# Patient Record
Sex: MALE | Race: WHITE | NOT HISPANIC OR LATINO | Employment: STUDENT | URBAN - METROPOLITAN AREA
[De-identification: names, ages, dates, MRNs, and addresses within clinical notes are randomized per-mention and may not be internally consistent; named-entity substitution may affect disease eponyms.]

---
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
## 2019-02-28 ENCOUNTER — OFFICE VISIT (OUTPATIENT)
Dept: INTERNAL MEDICINE | Facility: CLINIC | Age: 19
End: 2019-02-28
Payer: COMMERCIAL

## 2019-02-28 VITALS
DIASTOLIC BLOOD PRESSURE: 82 MMHG | OXYGEN SATURATION: 97 % | WEIGHT: 244.2 LBS | HEART RATE: 117 BPM | RESPIRATION RATE: 20 BRPM | SYSTOLIC BLOOD PRESSURE: 139 MMHG

## 2019-02-28 DIAGNOSIS — Z00.00 HEALTH CARE MAINTENANCE: ICD-10-CM

## 2019-02-28 DIAGNOSIS — Z76.89 ENCOUNTER TO ESTABLISH CARE WITH NEW DOCTOR: Primary | ICD-10-CM

## 2019-02-28 DIAGNOSIS — R00.0 TACHYCARDIA: ICD-10-CM

## 2019-02-28 DIAGNOSIS — F41.1 GENERALIZED ANXIETY DISORDER: ICD-10-CM

## 2019-02-28 DIAGNOSIS — F40.10 SOCIAL ANXIETY DISORDER: ICD-10-CM

## 2019-02-28 DIAGNOSIS — Z23 NEED FOR PROPHYLACTIC VACCINATION WITH TETANUS-DIPHTHERIA (TD): ICD-10-CM

## 2019-02-28 DIAGNOSIS — G43.109 MIGRAINE WITH AURA, NOT INTRACTABLE, WITHOUT STATUS MIGRAINOSUS: ICD-10-CM

## 2019-02-28 DIAGNOSIS — F33.2 MAJOR DEPRESSIVE DISORDER, RECURRENT EPISODE, SEVERE WITH ANXIOUS DISTRESS (H): ICD-10-CM

## 2019-02-28 DIAGNOSIS — R41.3 MEMORY PROBLEM: ICD-10-CM

## 2019-02-28 RX ORDER — TRAZODONE HYDROCHLORIDE 50 MG/1
100 TABLET, FILM COATED ORAL AT BEDTIME
Qty: 180 TABLET | Refills: 3 | COMMUNITY
Start: 2019-02-28 | End: 2019-12-11

## 2019-02-28 RX ORDER — PERPHENAZINE AND AMITRIPTYLINE HYDROCHLORIDE 2; 10 MG/1; MG/1
1 TABLET, FILM COATED ORAL AT BEDTIME
Qty: 90 TABLET | Refills: 3 | COMMUNITY
Start: 2019-02-28 | End: 2019-12-11

## 2019-02-28 RX ORDER — NORTRIPTYLINE HCL 25 MG
25 CAPSULE ORAL AT BEDTIME
Qty: 90 CAPSULE | Refills: 3 | COMMUNITY
Start: 2019-02-28 | End: 2019-12-11

## 2019-02-28 SDOH — HEALTH STABILITY: MENTAL HEALTH: HOW MANY STANDARD DRINKS CONTAINING ALCOHOL DO YOU HAVE ON A TYPICAL DAY?: 1 OR 2

## 2019-02-28 SDOH — HEALTH STABILITY: MENTAL HEALTH: HOW OFTEN DO YOU HAVE A DRINK CONTAINING ALCOHOL?: MONTHLY OR LESS

## 2019-02-28 SDOH — HEALTH STABILITY: MENTAL HEALTH: HOW OFTEN DO YOU HAVE 6 OR MORE DRINKS ON ONE OCCASION?: MONTHLY

## 2019-02-28 SDOH — HEALTH STABILITY: MENTAL HEALTH: HOW MANY DRINKS CONTAINING ALCOHOL DO YOU HAVE ON A TYPICAL DAY WHEN YOU ARE DRINKING?: 1 OR 2

## 2019-02-28 SDOH — HEALTH STABILITY: MENTAL HEALTH: HOW OFTEN DO YOU HAVE SIX OR MORE DRINKS ON ONE OCCASION?: MONTHLY

## 2019-02-28 ASSESSMENT — PAIN SCALES - GENERAL: PAINLEVEL: NO PAIN (0)

## 2019-02-28 NOTE — NURSING NOTE
TDAP shot given without problems,patient tolerated procedure well.Alessandra Mckenzie LPN 10:09 AM on 2/28/2019

## 2019-02-28 NOTE — PATIENT INSTRUCTIONS
Patient Education   Patient Education     Losing Weight for Heart Health  Excess weight is a major risk factor for heart disease. Losing weight has many benefits including lowering your blood pressure, improving your cholesterol level, and decreasing your risk for diseases such as diabetes and heart disease. It may help keep your arteries open so that your heart can get the oxygen-rich blood it needs. All in all, losing weight makes you healthier.  Patient Education     Weight Management: Healthy Eating  Food is your body s fuel. You can t live without it. The key is to give your body enough nutrients and energy without eating too much. Reading food labels can help you make healthy choices. Also, learn new eating habits to manage your weight. Nutrition labels are being redesigned by the FDA to emphasize the number of calories being consumed as well as the amount of more nutrients, such as added sugars, vitamin D, and potassium.     All the values on the label are based on one serving. The serving size is the average portion. Remember to multiply the values on the label by the number of servings you eat.   Eat less fat  A gram of fat has almost 2.5 times the calories of a gram of protein or carbohydrates. Try to balance your food choices so that only 20% to 35% of your calories comes from total fat. This means an average of 2  to 3  grams of fat for each 100 calories you eat.  Eat more fiber  High-fiber foods are digested more slowly than low-fiber foods, so you feel full longer. Try to get at least 25 grams of fiber each day for a 2000 calorie diet. Foods high in fiber include:    Vegetables and fruits    Whole-grain or bran breads, pastas, and cereals    Legumes (beans) and peas  As you start to eat more fiber, be sure to drink plenty of water to keep your digestive system working smoothly.  Tips  Do's and don'ts include:     Don t skip meals. This often leads to overeating later on. It s best to spread your  eating throughout the day.    Eat a variety of foods, not just a few favorites.    If you find yourself eating when you re not hungry, ask yourself why. Many of us eat when we re bored, stressed, or just to be polite. Listen to your body. If you re not hungry, get busy doing something else instead of eating.    Eat slower, shooting for 20 to 30 minutes for each meal. It takes 20 minutes for your stomach to tell your brain that it s full. Slow eaters tend to eat less and are still satisfied, while fast eaters may tend to be overeaters.     Pay attention to what you eat. Don t read or watch TV during your meal.  Date Last Reviewed: 4/1/2018 2000-2018 [employer]. [address] [place] [address], [place] [address]. All rights reserved. This information is not intended as a substitute for professional medical care. Always follow your healthcare professional's instructions.            Exercise with a friend. When activity is fun, you're more likely to stick with it.   Calories and weight loss    Calories are the fuel your body burns for energy. You get the calories you need from the food you eat. For healthy weight loss, women should eat at least 1,200 calories a day, men at least 1,500.    When you eat more calories than you need, your body stores the extra calories as fat. One pound of fat equals 3,500 calories.    To lose weight, try to reduce your total calorie intake by 500 calories. To do this, eat 250 calories less each day. Add activity to burn the other 250 calories. Walking 2.5 miles burns about 250 calories. Other more intense activities can burn more calories in the time you spend doing them, such as swimming and running. It is important to understand that reducing calorie intake is much more effective at weight loss than is exercise.    Eat a variety of healthy foods to get the nutrients you need.  Tips for losing weight    Drink 8 to 10 glasses of water a day.    Don t skip meals. Instead, eat  smaller portions.    Eat your meals earlier in the day.    Cut out sugary drinks such as soda and fruit juices.    Make your later meals lighter than your earlier meals.   Brisk activity is best  Brisk activity gets your heart pumping faster and it makes it healthier. It s also a great way to burn calories. In fact, your body may keep burning calories for hours after you stop a brisk activity:    Begin by walking 10 minutes most days.    Add more time and speed to your walk. Build up as you feel able.    Aim for 3 to 4 sessions of aerobic exercise a week. Each session should last about 40 minutes and include moderate to vigorous physical activity.    The most important part of the activity is that you break a sweat. This indicates your heart is working hard enough to burn fat.  Date Last Reviewed: 6/1/2016 2000-2018 [employer]. [address] [place] [address]. All rights reserved. This information is not intended as a substitute for professional medical care. Always follow your healthcare professional's instructions.           Taking Your Blood Pressure  Blood pressure is the force of blood against the artery wall as it moves from the heart through the blood vessels. You can take your own blood pressure reading using a digital monitor. Take your readings the same each time, using the same arm. Take readings as often as your healthcare provider instructs.  About blood pressure monitors  Blood pressure monitors are designed for certain ages and cases. You can find monitors for older adults, for pregnant women, and for children. Make sure the one you choose is the right one for your age and situation.  The American Heart Association recommends an automatic cuff monitor that fits on your upper arm (bicep). The cuff should fit your arm size. A cuff that s too large or too small will not give an accurate reading. Measure around your upper arm to find your size.  Monitors that attach to your  finger or wrist are not as accurate as monitors for your upper arm.  Ask your healthcare provider for help in choosing a monitor. Bring your monitor to your next provider visit if you need help in using it the correct way.  The steps below are general instructions for using an automatic digital monitor.  Step 1. Relax      Take your blood pressure at the same time every day, such as in the morning or evening, or at the time your healthcare provider recommends.    Wait at least a half-hour after smoking, eating, or exercising. Don't drink coffee, tea, soda, or other caffeinated beverages before checking your blood pressure.    Sit comfortably at a table with both feet on the floor. Do not cross your legs or feet. Place the monitor near you.    Rest for a few minutes before you begin.  Step 2. Wrap the cuff      Place your arm on the table, palm up. Your arm should be at the level of your heart. Wrap the cuff around your upper arm, just above your elbow. It s best done on bare skin, not over clothing. Most cuffs will indicate where the brachial artery (the blood vessel in the middle of the arm at the inner side of the elbow) should line up with the cuff. Look in your monitor's instruction booklet for an illustration. You can also bring your cuff to your healthcare provider and have them show you how to correctly place the cuff.  Step 3. Inflate the cuff      Push the button that starts the pump.    The cuff will tighten, then loosen.    The numbers will change. When they stop changing, your blood pressure reading will appear.    Take 2 or 3 readings one minute apart.  Step 4. Write down the results of each reading      Write down your blood pressure numbers for each reading. Note the date and time. Keep your results in one place, such as a notebook. Even if your monitor has a built-in memory, keep a hard copy of the readings.    Remove the cuff from your arm. Turn off the machine.    Bring your blood pressure records  with your healthcare providers at each visit.    If you start a new blood pressure medicine, note the day you started the new medicine. Also note the day if you change the dose of your medicine. This information goes on your blood pressure recording sheet. This will help your healthcare provider monitor how well the medicine changes are working.    Ask your healthcare provider what numbers should prompt you to call him or her. Also ask what numbers should prompt you to get help right away.  Date Last Reviewed: 11/1/2016 2000-2018 [employer]. [address] [place] [address], [place] [address]. All rights reserved. This information is not intended as a substitute for professional medical care. Always follow your healthcare professional's instructions.

## 2019-02-28 NOTE — PROGRESS NOTES
Pre charting time 10 minutes:    Looked for outside records:  From none in Care Everywhere, none in my inbox.  There is one note in our EHR, I reviewed ED visit 11/6/18   Presented to [place] ED because he was suicidal. Thoughts but no plan.  Escalating depression.  2 prior suicide attempts.   Followed at [facility] mental health clinic, had been seeing them every 2 weeks, not on meds at the time of the ED visit.  Evaluated by [facility] in the ED.  No alcohol or drug use.  Patient was discharged from the ED with an appointment in mental health the following day.   Lamotrigine and venlafaxine are on his current med list.    Dr. Billingsley    CC:  Establish care    HPI:  Here for the above.  We reviewed his medical history in detail and updated the EHR database.  His main chronic health condition is psychological in nature.  Interesting description of his behaviors growing up.  Currently depression, anxiety are most prominent symptoms.  Today scratched his left arm in reaction to stress.  He does have a history of self-injury behaviors.  Usually with items not sharp enough to cause deep cuts.  Interestingly he reports amnesia when he has episodes of self-harm.  Has therapist whom he sees weekly.  His psychiatrist is in his home state of [place].  I did advise him to get lined up with a psychiatrist here in [place] as well.  Alexandru is a freshman here at the .  His parents are physicians and he is quite knowledgeable and insightful about his mental health.    ROS:  Otherwise 10 point ROS is negative.    Past Medical History:   Diagnosis Date     Depressive disorder      Developmental CNS abnormality (H)     as child, emotional lability, over focusing on minor details, distractibility, difficult to reassure, overly fascinated with subjects, social skills dificulty. . .     Generalized anxiety disorder      Major depressive disorder, recurrent episode, severe with anxious distress (H)       Migraine with aura, not intractable, without status migrainosus      Obstructive sleep apnea      Premature birth     pre patient report, at 8 months gestation     Social anxiety disorder      Tremor      Past Surgical History:   Procedure Laterality Date     HEAD & NECK SURGERY       TONSILLECTOMY       Family History   Problem Relation Age of Onset     Depression Mother      Anxiety Disorder Mother      Nephrolithiasis Father      Diabetes Maternal Grandfather      Heart Failure Maternal Grandfather      Bipolar Disorder Maternal Grandfather      Hypertension Paternal Grandmother      Sleep Apnea Paternal Grandmother      Hypertension Paternal Grandfather      Social History     Socioeconomic History     Marital status: Single     Spouse name: Not on file     Number of children: Not on file     Years of education: Not on file     Highest education level: Not on file   Occupational History     Not on file   Social Needs     Financial resource strain: Not on file     Food insecurity:     Worry: Not on file     Inability: Not on file     Transportation needs:     Medical: Not on file     Non-medical: Not on file   Tobacco Use     Smoking status: Former Smoker     Smokeless tobacco: Former User   Substance and Sexual Activity     Alcohol use: Yes     Alcohol/week: 0.6 oz     Types: 1 Cans of beer per week     Frequency: Monthly or less     Drinks per session: 1 or 2     Binge frequency: Monthly     Drug use: No     Sexual activity: Not on file   Lifestyle     Physical activity:     Days per week: Not on file     Minutes per session: Not on file     Stress: Not on file   Relationships     Social connections:     Talks on phone: Not on file     Gets together: Not on file     Attends Taoism service: Not on file     Active member of club or organization: Not on file     Attends meetings of clubs or organizations: Not on file     Relationship status: Not on file     Intimate partner violence:     Fear of current or ex  partner: Not on file     Emotionally abused: Not on file     Physically abused: Not on file     Forced sexual activity: Not on file   Other Topics Concern     Not on file   Social History Narrative    Freshman here at the  of [place]. Parents are doctors. Has mental health therapist, [address], [place] [phone] and psychiatrist., Dr. Fuentes in [place]. Taoism Christianity willi, periodically vegan diet.     Current Outpatient Medications   Medication Sig Dispense Refill     LamoTRIgine (LAMICTAL PO) Take 200 mg by mouth daily        Metoprolol Succinate 50 MG CS24 Take 50 mg by mouth daily 90 capsule 3     nortriptyline (PAMELOR) 25 MG capsule Take 1 capsule (25 mg) by mouth At Bedtime 90 capsule 3     perphenazine-amitriptyline 2-10 MG TABS per tablet Take 1 tablet by mouth At Bedtime 90 tablet 3     traZODone (DESYREL) 50 MG tablet Take 2 tablets (100 mg) by mouth At Bedtime 180 tablet 3     Venlafaxine HCl (EFFEXOR PO) Take 150 mg by mouth daily        No Known Allergies  /82 (BP Location: Right arm, Patient Position: Sitting, Cuff Size: Adult Large)   Pulse 117   Resp 20   Wt 110.8 kg (244 lb 3.2 oz)   SpO2 97%   Exam otherwise deferred.  No signs of depression, anxiety today.  Normal affect.    Alexandru was seen today for establish care and sexual problem.    Diagnoses and all orders for this visit:    Encounter to establish care with new doctor    Need for prophylactic vaccination with tetanus-diphtheria (Td)  -     TDAP ( BOOSTRIX AGES 10-64)    Migraine with aura, not intractable, without status migrainosus    Tachycardia, chronic    Memory problem, social anxiety disorder, RACHEL, MDD  -     NEUROPSYCHOLOGY REFERRAL  -     MENTAL HEALTH REFERRAL  - Adult; Psychiatry and Medication Management; Psychiatry; [facility]: Psychiatry Clinic [phone]; We will contact you to schedule the appointment or please call with any questions    Health Care Maintenance:  I advised home BP and HR  checks, 3 times a week.  Discussed goals and gave information on how to check BP correctly  I also gave him info on diet and weight management.      Total time spent 40 minutes (not including pre charting).  More than 50% of the time spent with Mr. Mitchell on counseling / coordinating his care        F/U 6 months.    Jerrica Billingsley M.D.  Internal Medicine  [facility]   pager [phone]

## 2019-02-28 NOTE — NURSING NOTE
Chief Complaint   Patient presents with     [facility]     establish care     Sexual Problem     low sex drive   Alessandra Mckenzie LPN 8:52 AM on 2/28/2019    Has not been screened for HIV.Alessandra Mckenzie LPN 8:53 AM on 2/28/2019  Had HPV when was in [employer].Alessandra Mckenzie LPN 8:55 AM on 2/28/2019    Addendum: Patient has been feeling very sad and has one emergency room visit for wanting to kill himself.Alessandra Mckenzie LPN 8:58 AM on 2/28/2019    PHQ-2 not correct.Alessandra Mckenzie LPN 8:58 AM on 2/28/2019

## 2019-10-14 ENCOUNTER — TELEPHONE (OUTPATIENT)
Dept: INTERNAL MEDICINE | Facility: CLINIC | Age: 19
End: 2019-10-14

## 2019-10-14 DIAGNOSIS — R00.0 TACHYCARDIA: ICD-10-CM

## 2019-10-14 NOTE — TELEPHONE ENCOUNTER
Health Call Center    Phone Message    May a detailed message be left on voicemail: no    Reason for Call: Medication Refill Request    Has the patient contacted the pharmacy for the refill? Yes   Name of medication being requested: Metoprolol Succinate 50 MG CS24  Provider who prescribed the medication: Dr. Billingsley  Pharmacy: [facility]  Date medication is needed: Asap, Pt has 6 pills, Pt says the original script from Dr. Billingsley w/ 3 refills was never received by [place] pharmacy to begin with. Please call Pt to discuss.    Action Taken: Message routed to:  [facility] ([facility]): [facility]

## 2019-11-01 ENCOUNTER — OFFICE VISIT (OUTPATIENT)
Dept: NEUROPSYCHOLOGY | Facility: CLINIC | Age: 19
End: 2019-11-01
Payer: COMMERCIAL

## 2019-11-01 DIAGNOSIS — F41.1 GENERALIZED ANXIETY DISORDER: ICD-10-CM

## 2019-11-01 DIAGNOSIS — F31.5 BIPOLAR DISORDER, CURRENT EPISODE DEPRESSED, SEVERE, WITH PSYCHOTIC FEATURES (H): Primary | ICD-10-CM

## 2019-11-01 DIAGNOSIS — F41.0 PANIC DISORDER WITHOUT AGORAPHOBIA: ICD-10-CM

## 2019-11-01 NOTE — PROGRESS NOTES
The patient was seen for neuropsychological evaluation at the request of Jerrica Billingsley MD for the purposes of diagnostic clarification and treatment planning.  160 minutes of test administration and scoring were provided today by this writer. Testing was not completed due to schedule conflicts, and the patient will return at a later date to complete testing.    Joanna Bond  Psychometrist

## 2019-11-04 ENCOUNTER — OFFICE VISIT (OUTPATIENT)
Dept: NEUROPSYCHOLOGY | Facility: CLINIC | Age: 19
End: 2019-11-04
Payer: COMMERCIAL

## 2019-11-04 DIAGNOSIS — F31.5 BIPOLAR I DISORDER, MOST RECENT EPISODE (OR CURRENT) DEPRESSED, SEVERE, SPECIFIED AS WITH PSYCHOTIC BEHAVIOR (H): Primary | ICD-10-CM

## 2019-11-04 DIAGNOSIS — F41.0 PANIC DISORDER WITHOUT AGORAPHOBIA: ICD-10-CM

## 2019-11-04 DIAGNOSIS — F41.1 GENERALIZED ANXIETY DISORDER: ICD-10-CM

## 2019-11-04 NOTE — PROGRESS NOTES
The patient was seen to complete neuropsychological evaluation at the request of Jerrica Billingsley MD for the purposes of diagnostic clarification and treatment planning.  65 minutes of test administration and scoring were provided by this writer on this date, for a total of 225 minutes of test administration and scoring across both sessions.  Please see Dr. Douglas Sheldon's report for a full interpretation of the findings.    Joanna Bond  Psychometrist

## 2019-11-27 NOTE — PROGRESS NOTES
NAME: Alexandru Mitchell  MRN: [id number]  : 2000  DUCKWORTH: 2019 & 2019  Neuropsychology Laboratory  [address]  [phone]    NEUROPSYCHOLOGICAL EVALUATION    RELEVANT HISTORY AND REASON FOR REFERRAL    This is a report of neuropsychological consultation regarding Alexandru Mitchell, a 19-year-old, right-handed University student with 13 years of education thus far. He is referred for neuropsychological assessment of brain functioning by his primary care provider, Dr. Jerrica Billingsley. Her referral request lists concerns about memory, anxiety, and depression.     Mr. Do says he is not concerned about his memory at this time. He says issues really only surface in the presence of flagrant mental health symptoms. He says just before he saw Dr. Billingsley, he had an episode ( a fit ) in which he was engaging in self-harm, cutting his arm. He had trouble remembering details of the incident in discussions with Dr. Billingsley. Otherwise, he is not particularly concerned about his memory. He does note that he feels easily distracted, and his own racing thoughts derail his capacity to sustain attention. When he is stressed out, cognitive slowing becomes prominent. He also experiences thought blocking.    Mr. Mitchell is originally from [place] and has come here for college. He is currently majoring in [employer]. He says that on paper, his academic performances still look great, but his mental health struggles seriously interfere.    It seems he has previously been diagnosed with generalized anxiety, social anxiety, and major depressive disorder. He has a history of suicide attempts (three events) and self-harm behaviors starting at age 14. He has a history of hallucinatory experiences. He says suicide and self-harm options are always on the back of his mind, but he denies any current intent. He does describe specific methods that he would have in mind, such as jumping off  the [place] bridge or getting in the way of the light rail train.     He reports rumination on the death of his grandfather in 2012. He describes social isolation. He reports racing thoughts and about weekly periods of going entirely without sleep. He sometimes hears music in his head, the voice of his grandfather, and the voice of an uncle (who committed suicide). He denies any visual hallucinatory experiences. He says the auditory hallucinations only come on in times of severe mental distress. Any thoughts of his grandfather or uncle immediately provoke depressive symptoms. He is chronically tired yet also beset by anxiety-driven insomnia when he tries to sleep. He also reports times when he will feel  weirdly happy  or energetic and go entirely without sleep, perhaps 1-2 times per month. He describes sleep as all or nothing. If he goes without sleep he crashes the next day and sleeps for 10-12 hours straight. He describes himself as chronically highly anxious. He has had increased frequency of panic attacks since starting college. For example, he says he had 5-6 panic attacks this week alone. His only medication aimed at stemming panic is metoprolol. He feels it is ineffective. He has had some initial calls trying to establish care with a psychiatrist up here but has not yet done so. His current medications include venlafaxine, lamotrigine, perphenazine-amitriptyline, trazodone, metoprolol, and nortriptyline. He sees OSBALDO Roberson, at [facility]. He has weekly sessions. He signed a release of information form allowing me to send a copy of this report to her. He says he feels comfortable talking to Ms. Solo if thoughts of self-harm worsen.    He describes a baseline personality of being a rigid rule follower, sometimes to a fault. This flipped around 7th grade, after his uncle committed suicide. While he no longer feels the need to rigidly adhere to externally applied roles, he says  he still feels a need to rigidly adhere to his own  internal code.     Growing up, he says he never felt the need to have friends. He does some socializing here at college, but he says he is scared of making connections with others as a result of bad experiences he had with peers back in [place]. He says he has never had an intrinsic sense of social skills. There were concerns about pervasive developmental disorders when he was in . His parents, who are both physicians, sought out a workup at the [facility]. Mr. Mitchell says that autism spectrum disorders were ruled out, but his parents were told there was something atypical in his behaviors nonetheless.    He was in [employer] and achieved the honor of [employer] . He describes some [employer]-based volunteer work, in addition to his schoolwork.    Academically, language arts were always a real strength for him. He also liked history and biology. Anything with math was difficult. He felt a general lack of conceptual comprehension in mathematics. With a great deal of work, he was able to get C- grades in his high school math classes.     He reports occasional alcohol use currently, depending on access. Over the summer, he had a stretch of about 2-1/2 months in which he was drinking daily. He estimates averaging 6 drinks per day at that time, and there was a stretch of 1-2 weeks in which his use was well over that. His past use was generally in a binging pattern. Two prior suicide attempts were associated with the use of 190-proof moonshine plus an overdose on a variety of prescription medications.    He describes himself as dependent upon tobacco. He has quit smoking cigarettes and has been trying to use an e-cigarette and lozenges to wean himself off of nicotine. He occasionally uses chewing tobacco.    He says he once tried marijuana but has not experimented with any other drugs or been a habitual drug user.    Reported family history includes nicotine  dependence on all sides of the family. His maternal grandfather was diagnosed with bipolar II disorder. As noted, an uncle committed suicide. He describes his mother as having  weapons-grade anxiety  but refusing to address it. He has a younger sister who seems to be psychologically healthy, with the normal ups and downs compared to her peers.    BEHAVIORAL OBSERVATIONS    Mr. Mitchell was polite and cooperative with the evaluation. He was tense and fidgety throughout the our time together. He described himself as scared and pointed out the fact that his hands were shaking during the testing session. I conducted the interview alongside a graduate practicum student in our clinic. He was very open and candid during the interview, despite a clear sense of social discomfort. He demonstrated a high level of psychological insight. There were no aphasic qualities to his speech production. Language comprehension was full and immediate. He needed occasional repetitions of individual test items but not full test instructions. He was notably distractible at times. He tended to be careless in his approach to visuoperceptual tasks. He tended to doubt his abilities. He persisted well with all requested procedures and appeared to put forth good effort. The cognitive testing and brief questionnaires were completed at the first visit (11/01/2019), and he came back on another day (11/04/2019) to complete the MMPI-2-RF. Acute anxiousness and distractibility may have interfered with some of his test performances, but I believe the data provide reasonable reflections of his cognitive abilities.    MEASURES ADMINISTERED    The following measures were administered by a trained psychometrist, under my direct supervision:    Wide Range Achievement Test 4: Word Reading, Math Computation; Wechsler Adult Intelligence Scale-IV: Similarities, Information, Block Design, Matrix Reasoning, Digit Span, Arithmetic, Coding; Controlled Oral Word Association  Test; PEARCE Sentence Repetition; Bettye Visual Acuity Screen; Grooved Pegboard; Trail Making Test; Porteus Maze Test; Azael-Osterrieth Complex Figure Test; Azael Auditory Verbal Learning Test; Dot Counting Test; Vaz Depression Inventory-II; State-Trait Anxiety Inventory; Wender-[place] Rating Scales; Minnesota Multiphasic Personality Jwoyeonun-3-TN.    RESULTS AND INTERPRETATION    Academic Achievement: Performance on a reading and pronunciation test that is validated for estimating premorbid intelligence was in the very superior range (96th percentile). Consistent with his self-report of relative weakness in mathematics, his written math skills were in the middle average range (50th percentile).    Intellectual Abilities: Abstract verbal analogical reasoning was in the superior range. General fund of information was in the very superior range. Visual reasoning through pattern identification was high average. Nonverbal reasoning through hands-on object assembly was high average.    Language & Related Skills: As noted above, basic reading and pronunciation skills were very superior. Associative verbal fluency was average. Verbatim sentence repetition was on the lower side of average.    Visual Perceptual & Constructional Skills: Binocular, uncorrected, near-point visual acuity was 20/20 on Bettye screening. His copy of a complex geometric figure was in the impaired range for his age. His drawing did not suggest fundamental perceptual or constructional defects. Imprecision in his figure copy appeared to be due to a somewhat piecemeal approach leading to erasures and re-drawings of several areas.    Motor Skills: Speeded fine-motor dexterity for placing shaped pegs into holes was bilaterally average.    Mental Speed & Executive Functioning: As noted above, speeded verbal fluency performances were average; he did not have any repetition or set-loss errors. Graphomotor clerical speed was on the lower side of average.  Visual scanning and graphomotor sequencing under simple conditions was low average for speed and had no errors. Scanning and sequencing under greater executive demands to control divided attention was borderline impaired for speed and had two errors. Planning, foresight, and learning from errors were high normal on an unspeeded maze-solving test.    Attention & Working Memory: Immediate auditory attention and working memory were on the lower side of average for repeating and rearranging digit strings, and his performances were notably inconsistent across trials. Attention and working memory were average for solving mental math problems.     Learning & Anterograde Memory: A few minutes after the initial copy, incidental free recall of the complex figure was in the low average range. Free recall of the figure remained in the low average range after 30 minutes, while recognition of individual figure elements was high average. Learning an extensive word list over repeated readings was average. Free recall of the list was average after a brief delay with active interference and after a 30-minute delay. Delayed recognition of the list was perfect.    Emotional, Behavioral, & Personality Functioning: On a brief self-report inventory, he endorsed symptoms consistent with a severe major depressive episode (BDI-II = 41).    He rated his general, day-to-day anxiety levels as severely elevated (Trait Anxiety = 100th percentile). He rated his in-the-moment anxiety during the testing session as severely elevated (State Anxiety = 100th percentile).    His retrospective self-report of childhood and adolescent behaviors was below established cutoffs for strongly suggesting the presence of developmental ADHD (WURS = 37), though the score suggests clear tendencies in that direction.    His responses to a lengthy questionnaire for objective assessment of personality functioning and emotional coping patterns (MMPI-2-RF) was technically  invalid for a pattern of widespread symptom overreporting. Overreporting of psychological dysfunction was indicated by a considerably larger than average number of empirically infrequent symptom endorsements. He also asserted a much larger than average number of symptoms that are rarely described by individuals with genuine, severe psychopathology. He endorsed a considerably larger than average number of somatic symptoms that are rarely described by individuals with genuine medical problems. He produced elevations on embedded validity scales that have empirical associations to noncredible memory complaints and a high likelihood for psychogenic amplification of organic-seeming suffering. In this context, seven of the nine core clinical scales were significantly elevated. The majority of the various supplemental scales were also significantly elevated. Such a  floating profile  is generally a result of symptom exaggeration. This is not to say that Mr. Mitchell is without psychological difficulties. The appropriate interpretation is that he likely feels completely overwhelmed by his mental health symptoms, that they seem to affect all domains of his daily functioning, and that he probably sees them as entirely beyond his personal control. This MMPI-2-RF profile leaves a relatively wide diagnostic differential. Considerations would include severe monopolar and bipolar mood disorders, severe anxiety disorders, psychotic disorders, somatic symptom disorders, and personality pathology.    IMPRESSIONS    The neuropsychological results are abnormal, primarily for psychological considerations.    Cognitively, he demonstrates variability and relative weaknesses in attention, concentration, working memory, and speeded executive control, with performances ranging from borderline impaired to middle average. He has exceptional strengths in verbally mediated intellectual abilities, in the very superior range. Nonverbal/spatial abilities  are still in the high average range but about 1.5 standard deviations below the verbal abilities. Complex visuoconstructional abilities are lower than expected. Anterograde memory functions are normal. I do not suspect acquired or encroaching neurologic dysfunction. I think the neuropsychological profile is primary explained by neurodevelopmental factors (i.e., his long-standing pattern of strengths and weaknesses) and related mental health factors.    Psychometric assessments of mental health symptoms and personality functioning suggest severe emotional distress, though a high likelihood for excess of symptom endorsement. In interview, his descriptions of presenting concerns and recent behaviors primarily suggest bipolar disorder (currently mostly on the depressive side) coupled with severe anxiety and panic. He may meet criteria for a major depressive episode with psychotic features, but schizoaffective disorder and prodromal or first-break schizophrenia would still need to be ruled out.     RECOMMENDATIONS    The primary recommendation would be for more aggressive approaches to treating mental health. He has a history of multiple suicide attempts and self-harm behaviors. Close suicide monitoring is needed. In addition to his current psychotherapy sessions, it would be reasonable to consider an intensive outpatient or day treatment programs. He should have a low threshold for seeking inpatient treatment, especially if thoughts related to self-harm should worsen.    I encourage him to follow through on his plans to establish care with a local psychiatrist. He says he has not yet tried to see a provider at [facility] ([email or web address]). I think he should try getting in with them first, to work on stabilization of symptoms. He may also try establishing care with a [facility] provider through the psychiatry department at the [facility]  ([phone]), or at the [facility] location ([phone]). He may wish to speak with a psychiatrist about whether or not he is an appropriate candidate for advanced therapies like transcranial magnetic stimulation or ketamine infusions, treatments that are offered at the [facility] location.    He should avail himself of all available student counseling and mental health supports offered by the [facility].    [email or web address]    [email or web address]    He should be encouraged to abstain from alcohol use at this time. In addition to avoiding legal issues related to being underage, problematic patterns of alcohol consumption are not going to help his current emotional symptoms. It is worth reiterating that two of his three prior suicide attempts were associated with excessive alcohol consumption.     He describes struggling to cut back on tobacco use. Referral for a clinical tobacco cessation program would be appropriate.    A neuropsychological baseline has been established. I do not foresee a need for reevaluation in the near term. I or my colleagues would be happy to see him again, if ever clinically indicated.    Douglas Sheldon, PhD, LP, ABPP-CN  Board Certified in Clinical Neuropsychology  Licensed Psychologist [id number]     Department of Rehabilitation Medicine  Division of Adult Neuropsychology  [facility]      Time spent: One hour neurobehavioral status exam including interview, clinical assessment by licensed and board-certified neuropsychologist (CPT 82823). One hour neuropsychological testing evaluation by licensed and board-certified neuropsychologist, including integration of patient data, interpretation of standardized test results and clinical data, clinical decision-making, treatment planning, report, and interactive feedback to the patient, first hour (CPT 77884). Two hours of neuropsychological  testing evaluation by licensed and board-certified neuropsychologist, including integration of patient data, interpretation of standardized test results and clinical data, clinical decision-making, treatment planning, report, and interactive feedback to the patient, subsequent hours (CPT 55745). 30 minutes of psychological and neuropsychological test administration and scoring by technician, first 30 minutes (CPT 78828). 195 minutes psychological or neuropsychological test administration and scoring by technician, subsequent 30-minute intervals (CPT 45359). Diagnoses: F31.5, F41.1, F41.0

## 2019-11-27 NOTE — PROGRESS NOTES
This appointment on 11/04/2019 was for a continuation of services began on 11/01/2019. Data from both visits were integrated and documented in my full report attached to the earlier encounter.     Douglas Sheldon, PhD, LP, ABPP-CN  Board Certified in Clinical Neuropsychology ([id number])

## 2019-11-27 NOTE — PROGRESS NOTES
Name: Alexandru Mitchell MRN: [id number]  : 2000  DUCKWORTH: 2019  Staff: AMARA Tech: NN Age: 19  Sex: M Hand: RH Educ: 13-15  Vision: 20/20 ?with correction / ?without correction    WRAT4   SS %ile Grade Equiv.     Word Reading  126 96 >12.9     Math Computation 100 50 12.4    WAIS-IV     VCI: ~141    CORTES: ~117*      WMI: 92      Raw SSa     Similarities  32 15     Information  24 18     Block Design  57 13     Matrix Reasoning 23 13     Digit Span  24 8 RDS= 6     Arithmetic  13 9     Coding  59 8    COWAT (FAS)     Raw: 46  SS: 12 T: 52    PEARCE SENTENCE REPETITION      Raw: 11/14 %ile: 25    GROOVED PEGBOARD   Raw  Drops SS T     RH  72  0 8 49     LH 79  0 8 48    TRAILMAKING TEST   Raw  Err SS T     A 33  0 9 38     B 96  2 8 32    PORTEUS MAZE TEST     Test Age: 16.5    JOSE G-O    Raw    T %ile     Time to Copy  191      >16     Copy    28     ?1     Short Delay Recall 20 39 14     Long Delay Recall 20 38 12     Recognition Total 23 59 82    AVLT (<55, De Leon metanorms)     Trial 1 2 3 4 5 B 6 30              6 7 11 12 13 5 12 12      Raw M(SD)     Trial 5    13 12.9(1.8)     Short Delay Recall  12 11.5(2.3)     30  Recall   12 11.3(2.5)     30  Recognition Hits/FPs  15/0 14.3(1.1)     BDI-II     Raw:  41  Interpretation: severe    STAI     S: 68; %ile: 100  Interpretation: severe     T: 67; %ile: 100  Interpretation: severe    WURS     Raw:  37  Interpretation: Below cutoff    MMPI-2-RF     RCd: 86 VRIN-r:  58     RC1: 88 MELO-r:  57F     RC2: 88 F-r:  101     RC3: 65 Fp-r:  94     RC4: 57 Fs:  107     RC6: 70 FBS-r:  86     RC7: 81 RBS:  105     RC8: 83 L-r:  42     RC9: 58 K-r:  28

## 2019-12-09 ENCOUNTER — OFFICE VISIT (OUTPATIENT)
Dept: INTERNAL MEDICINE | Facility: CLINIC | Age: 19
End: 2019-12-09
Payer: COMMERCIAL

## 2019-12-09 VITALS
HEART RATE: 112 BPM | OXYGEN SATURATION: 96 % | HEIGHT: 71 IN | TEMPERATURE: 98.7 F | SYSTOLIC BLOOD PRESSURE: 137 MMHG | BODY MASS INDEX: 35.69 KG/M2 | WEIGHT: 254.9 LBS | RESPIRATION RATE: 18 BRPM | DIASTOLIC BLOOD PRESSURE: 90 MMHG

## 2019-12-09 DIAGNOSIS — Z23 NEED FOR IMMUNIZATION AGAINST INFLUENZA: ICD-10-CM

## 2019-12-09 DIAGNOSIS — F33.1 MAJOR DEPRESSIVE DISORDER, RECURRENT EPISODE, MODERATE (H): ICD-10-CM

## 2019-12-09 DIAGNOSIS — F41.9 ANXIETY: Primary | ICD-10-CM

## 2019-12-09 ASSESSMENT — ANXIETY QUESTIONNAIRES
2. NOT BEING ABLE TO STOP OR CONTROL WORRYING: NEARLY EVERY DAY
5. BEING SO RESTLESS THAT IT IS HARD TO SIT STILL: MORE THAN HALF THE DAYS
3. WORRYING TOO MUCH ABOUT DIFFERENT THINGS: NEARLY EVERY DAY
1. FEELING NERVOUS, ANXIOUS, OR ON EDGE: NEARLY EVERY DAY
6. BECOMING EASILY ANNOYED OR IRRITABLE: NEARLY EVERY DAY
7. FEELING AFRAID AS IF SOMETHING AWFUL MIGHT HAPPEN: NOT AT ALL
GAD7 TOTAL SCORE: 16

## 2019-12-09 ASSESSMENT — PATIENT HEALTH QUESTIONNAIRE - PHQ9
SUM OF ALL RESPONSES TO PHQ QUESTIONS 1-9: 15
5. POOR APPETITE OR OVEREATING: MORE THAN HALF THE DAYS

## 2019-12-09 ASSESSMENT — MIFFLIN-ST. JEOR: SCORE: 2188.35

## 2019-12-09 NOTE — PATIENT INSTRUCTIONS
[facility] Medication Refill Request Information:  * Please contact your pharmacy regarding ANY request for medication refills.  ** [facility] Prescription Fax = [phone]  * Please allow 3 business days for routine medication refills.  * Please allow 5 business days for controlled substance medication refills.     [facility] Test Result notification information:  *You will be notified with in 7-10 days of your appointment day regarding the results of your test.  If you are on MyChart you will be notified as soon as the provider has reviewed the results and signed off on them.        General resources in case you are feeling increasingly depressed and/or suicidal:    Call 911 or go directly to an Emergency Room (such as [facility] or [facility]) if you need help immediately      24/7 Crisis Hotlines:    National Suicide Prevention Hotline                                471-133-QTRW (4294)    Additional Crisis Hotlines:  Crisis Connection                                                                                                                        189.799.3550      [facility] ([facility]) [phone]  Emergency Department  [address] [place] [address]

## 2019-12-09 NOTE — PROGRESS NOTES
"CC:  F/u depression and neuropsych testing    HPI:  Alexandru established care with me in February of this year.  Hx depression, anxiety, self injurious behaviors (scratching skin with sharp items). Also hx suicidal ideation and suicide attempts.  He is from [place], going to school here (sophomore).  Notes history of auditory hallucinations (e.g. hearing dead relatives talking, and music as clearly as if wearing headphones).  No menacing or frightening voices, no auditory hallucinations, no paranoia.  He states that after he has experienced the hallucinations he becomes aware that they could not have been real, and yet he heard them. Alexandru also notes that up to 3 times a year he has periods of time where he requires little sleep, talks fast and feels \"up\", even though he actual feels sad.  These typically occur during times of stress, especially in April (anniversary of his uncle's death from suicide) and October (anniversary of his  grandfather's birthday).  The most episode was for the last approx. 2 weeks in October.  On Oct 31st, the girl he had been dating confessed to him that she is asexual. This triggered depression which he has not been able to recover from yet.     PHQ and RACHEL reviewed today.  He feels anxious, especially around people and when trying to explain himself.  No panic attacks.  He feels depressed, and is having difficulty with concentrating and focusing.  He is doing better in his classes this semester compared to last year. However, he is having to exert more effort to keep his performance up.  His Ghanaian professor expressed concern when Alexandru got a C on an exam. Normally he does very well in Ghanaian. He remains optimistic that his school performance will continue to improve, especially now that he has more classes in his area of interest (liberal arts).  It is nearing the end of the semester and he is glad he has been able to keep up in his classes despite feeling so depressed and " "anxious. Some of his friends have expressed concern about his mental health.  This made Alexandru irritable and the friends have backed off.      Alexandru is not suicidal.  He admits to wondering sometimes if he'd be better off dead, but has no intention of harming himself.  He is not currently having hallucinations.  No excessive alcohol use. He mentioned that against his usual good judgement he tried marijuana, but we did not explore this further at this time.  I did not get the sense that this is a regular habit of his.    We spent some time today talking about how to seek care if he got more depressed, or if he was suicidal.  The plan we agreed upon would be to talk to someone, call a Crisis line, go to [place] (if open) and go to the ED at [facility] ([facility]  Information was given on all of this.    Alexandru has a psychiatrist in [place] who has been prescribing the medication he takes currently.  I confirmed with Alexandru that he is taking his medications as prescribed.    In Feb of this year, I referred him to Neuropsychology  and Adult Psychiatry for med management.  He does not recall receiving the letter from [facility], sent on 3/8/19.  We confirmed that it was sent to the correct address.  He very much wanted to establish care with a psychiatrist, and is frustrated by his problems with keep organized.    Alexandru saw Dr. Sheldon/Neuropsychology on 11/1/19 for neuropsych testing.  Impression and Plan copy/pasted below:  Alexandru is concerned about the possibility of having Bipolar Disorder.    Impression:  \"The neuropsychological results are abnormal, primarily for psychological considerations.     Cognitively, he demonstrates variability and relative weaknesses in attention, concentration, working memory, and speeded executive control, with performances ranging from borderline impaired to middle average. He has exceptional strengths in verbally mediated intellectual abilities, in the very superior range. " Nonverbal/spatial abilities are still in the high average range but about 1.5 standard deviations below the verbal abilities. Complex visuoconstructional abilities are lower than expected. Anterograde memory functions are normal. I do not suspect acquired or encroaching neurologic dysfunction. I think the neuropsychological profile is primary explained by neurodevelopmental factors (i.e., his long-standing pattern of strengths and weaknesses) and related mental health factors.     Psychometric assessments of mental health symptoms and personality functioning suggest severe emotional distress, though a high likelihood for excess of symptom endorsement. In interview, his descriptions of presenting concerns and recent behaviors primarily suggest bipolar disorder (currently mostly on the depressive side) coupled with severe anxiety and panic. He may meet criteria for a major depressive episode with psychotic features, but schizoaffective disorder and prodromal or first-break schizophrenia would still need to be ruled out.      Plan:  The primary recommendation would be for more aggressive approaches to treating mental health. He has a history of multiple suicide attempts and self-harm behaviors. Close suicide monitoring is needed. In addition to his current psychotherapy sessions, it would be reasonable to consider an intensive outpatient or day treatment programs. He should have a low threshold for seeking inpatient treatment, especially if thoughts related to self-harm should worsen.     I encourage him to follow through on his plans to establish care with a local psychiatrist. He says he has not yet tried to see a provider at [facility] ([email or web address]). I think he should try getting in with them first, to work on stabilization of symptoms. He may also try establishing care with a [facility] provider through [facility]  " and [facility] ([phone]), or at the [facility] location ([phone]). He may wish to speak with a psychiatrist about whether or not he is an appropriate candidate for advanced therapies like transcranial magnetic stimulation or ketamine infusions, treatments that are offered at the [facility] location.     He should avail himself of all available student counseling and mental health supports offered by the [facility].    [email or web address]    [email or web address]     He should be encouraged to abstain from alcohol use at this time. In addition to avoiding legal issues related to being underage, problematic patterns of alcohol consumption are not going to help his current emotional symptoms. It is worth reiterating that two of his three prior suicide attempts were associated with excessive alcohol consumption.      He describes struggling to cut back on tobacco use. Referral for a clinical tobacco cessation program would be appropriate.     A neuropsychological baseline has been established. I do not foresee a need for reevaluation in the near term. I or my colleagues would be happy to see him again, if ever clinically indicated.\"     Douglas Sheldon, PhD, LP, ABPP-CN    Patient Active Problem List   Diagnosis     Major depressive disorder, recurrent episode, severe with anxious distress (H)     Generalized anxiety disorder     Social anxiety disorder     Migraine with aura, not intractable, without status migrainosus     Current Outpatient Medications   Medication Sig Dispense Refill     LamoTRIgine (LAMICTAL PO) Take 200 mg by mouth daily        metoprolol succinate ER (TOPROL-XL) 50 MG 24 hr tablet Take 1 tablet (50 mg) by mouth daily 90 tablet 3     nortriptyline (PAMELOR) 25 MG capsule Take 1 capsule (25 mg) by mouth At Bedtime 90 capsule 3     perphenazine-amitriptyline 2-10 MG TABS per tablet Take 1 tablet by mouth At Bedtime 90 " "tablet 3     traZODone (DESYREL) 50 MG tablet Take 2 tablets (100 mg) by mouth At Bedtime 180 tablet 3     Venlafaxine HCl (EFFEXOR PO) Take 150 mg by mouth daily        No Known Allergies  BP (!) 137/90 (BP Location: Right arm, Patient Position: Chair, Cuff Size: Adult Large)   Pulse 112   Temp 98.7  F (37.1  C) (Oral)   Resp 18   Ht 1.803 m (5' 11\")   Wt 115.6 kg (254 lb 14.4 oz)   SpO2 96%   BMI 35.55 kg/m      Gen:  Well groomed, anxious appearing.  Not tangential, speech not pressured, not psychomotor slowed. Does not appear disorganized in his thinking today.      Alexandru was seen today for recheck medication.    Diagnoses and all orders for this visit:    Anxiety, Major depressive disorder, recurrent episode, moderate-severe (H). Possible bipolor I. Hx auditory hallucinations, not active.  Not suicidal.   See HPI for additional details.  I have already placed a referral to [facility] earlier this year.  I gave Alexandru the contact information for him to schedule an appointment.  In the meantime, I do feel his is quite vulnerable and his symptoms are not under control despite his multi-drug regimen.  He is agreeable to seeing Dr. Olivo for an interim consultation.  I will coordinate care with Psychiatry.  I also gave Alexandru Suicide and Crisis Connection phone numbers, as well as the address and phone # for the [facility].  -     BEHAVIORAL / SPIRITUAL HEALTH ([facility] ONLY); Future    Need for immunization against influenza  -     FLU VAC PRESRV FREE QUAD SPLIT VIR 3+YRS IM    F/U  Date will depend on when he can connect with psychiatry for ongoing management.    Total time spent 45 minutes.  More than 50% of the time spent with Mr. Mitchell on counseling / coordinating his care      Jerrica Billingsley M.D.  Internal Medicine  [facility]   pager [phone]                 "

## 2019-12-10 ASSESSMENT — ANXIETY QUESTIONNAIRES: GAD7 TOTAL SCORE: 16

## 2019-12-11 DIAGNOSIS — G43.109 MIGRAINE WITH AURA, NOT INTRACTABLE, WITHOUT STATUS MIGRAINOSUS: ICD-10-CM

## 2019-12-11 DIAGNOSIS — I10 ESSENTIAL HYPERTENSION: ICD-10-CM

## 2019-12-11 DIAGNOSIS — F41.1 GENERALIZED ANXIETY DISORDER: ICD-10-CM

## 2019-12-11 DIAGNOSIS — F33.2 MAJOR DEPRESSIVE DISORDER, RECURRENT EPISODE, SEVERE WITH ANXIOUS DISTRESS (H): ICD-10-CM

## 2019-12-11 DIAGNOSIS — F40.10 SOCIAL ANXIETY DISORDER: ICD-10-CM

## 2019-12-11 RX ORDER — VENLAFAXINE 75 MG/1
150 TABLET ORAL DAILY
Qty: 60 TABLET | Refills: 1 | Status: SHIPPED | OUTPATIENT
Start: 2019-12-11 | End: 2020-02-11

## 2019-12-11 RX ORDER — PERPHENAZINE AND AMITRIPTYLINE HYDROCHLORIDE 2; 10 MG/1; MG/1
1 TABLET, FILM COATED ORAL AT BEDTIME
Qty: 30 TABLET | Refills: 1 | Status: SHIPPED | OUTPATIENT
Start: 2019-12-11 | End: 2020-09-03

## 2019-12-11 RX ORDER — NORTRIPTYLINE HCL 25 MG
25 CAPSULE ORAL AT BEDTIME
Qty: 30 CAPSULE | Refills: 1 | Status: SHIPPED | OUTPATIENT
Start: 2019-12-11 | End: 2020-10-28

## 2019-12-11 RX ORDER — TRAZODONE HYDROCHLORIDE 50 MG/1
100 TABLET, FILM COATED ORAL AT BEDTIME
Qty: 60 TABLET | Refills: 1 | Status: SHIPPED | OUTPATIENT
Start: 2019-12-11 | End: 2020-03-31

## 2019-12-11 RX ORDER — LAMOTRIGINE 200 MG/1
200 TABLET ORAL DAILY
Qty: 30 TABLET | Refills: 1 | Status: SHIPPED | OUTPATIENT
Start: 2019-12-11 | End: 2020-03-06

## 2019-12-11 RX ORDER — METOPROLOL SUCCINATE 50 MG/1
50 TABLET, EXTENDED RELEASE ORAL DAILY
Qty: 30 TABLET | Refills: 1 | Status: SHIPPED | OUTPATIENT
Start: 2019-12-11 | End: 2020-04-29

## 2019-12-12 ENCOUNTER — TELEPHONE (OUTPATIENT)
Dept: INTERNAL MEDICINE | Facility: CLINIC | Age: 19
End: 2019-12-12

## 2019-12-12 RX ORDER — VENLAFAXINE HYDROCHLORIDE 75 MG/1
150 TABLET, EXTENDED RELEASE ORAL DAILY
Qty: 60 TABLET | Refills: 1 | Status: SHIPPED | OUTPATIENT
Start: 2019-12-12 | End: 2020-02-18 | Stop reason: DRUGHIGH

## 2019-12-12 NOTE — TELEPHONE ENCOUNTER
Er capsule in past. Right now its IR. Effexor Ask MD   Lamictal in past.? Otherwise it should be titrated. OK  perphenazine-amitriptyline 2-10 MG TABS per tablet OK   nortriptyline (PAMELOR) 25 MG capsule OK  Called the pharmacy back to cancel the IR effexor MD will send a ER effexor. All other medications the patient has been on before so no titrate needed. MD consulted with psych provider. Lori Marquez Paramedic on 12/12/2019 at 12:46 PM

## 2019-12-12 NOTE — TELEPHONE ENCOUNTER
Health Call Center    Phone Message    May a detailed message be left on voicemail: yes    Reason for Call: Medication Question or concern regarding medication   Prescription Clarification  Name of Medication: ALL  Prescribing Provider: Jerrica Billingsley   Pharmacy: [place] [id number]     What on the order needs clarification? Jo called and stated she needs to confirm these medications as this is a new patient for them.     Please call the pharmacy          Action Taken: Message routed to:  [facility] ([facility] CLAUDIO

## 2020-02-04 ENCOUNTER — OFFICE VISIT (OUTPATIENT)
Dept: PSYCHIATRY | Facility: CLINIC | Age: 20
End: 2020-02-04

## 2020-02-04 ENCOUNTER — OFFICE VISIT (OUTPATIENT)
Dept: PSYCHIATRY | Facility: CLINIC | Age: 20
End: 2020-02-04
Attending: PSYCHOLOGIST
Payer: COMMERCIAL

## 2020-02-04 DIAGNOSIS — F31.9 BIPOLAR I DISORDER (H): Primary | ICD-10-CM

## 2020-02-04 NOTE — PROGRESS NOTES
"Department of Psychiatry  Early Stage Mood Disorder Program  Diagnostic Assessment      Date of Service: 2/4/2020  Care Provider: Dewey Murcia MA  Diagnostic interview time with patient (12383): 80 minutes.   Psychological assessment and scoring (87185 + 26600): 30 minutes.  Review of records, interpretation, and report writing (56433): 90 minutes.     IDENTIFYING DATA: Alexandru Mitchell is a 19 year old cisgender male (pronouns: he/him/his) who presented for the current evaluation as part of the intake process for the Early Stage Mood Disorder Program. The following information was obtained through a clinical interview, self-report questionnaires, and chart review. Pt has received prior diagnoses of Major Depressive Disorder, severe recurrent with anxious distress (296.23), Generalized Anxiety Disorder (300.02), and Social Anxiety Disorder (300.23).      CHIEF COMPLAINT      Interested in diagnostic clarification.     PSYCHIATRIC AND DIAGNOSTIC INTERVIEW      The [employer] Neuropsychiatric Interview was completed to aid in diagnostic assessment of current psychological functioning.    The pt endorsed a history of depression that began in 7th grade while he was living in [place], [place]. He had a close relationship with his maternal great-uncle, who completed suicide in 2012. After this event he began \"acting out\" (inappropriate contact with female peers at school, fighting with mother) and he was placed in counseling from 8798-6732, an experience that he described as \"deeply invalidating.\" In late 2016, he had an altercation with his mother where he expressed suicidal ideation and was hospitalized after a suicide attempt. He did not provide details on the means of this attempt. At this time he was diagnosed with Major Depressive Disorder, severe, recurrent with anxious distress, Social Anxiety Disorder, and Generalized Anxiety Disorder. He then received psychiatric care under Ruben Fuentes MD at [facility] in " "[place], [place]. He found a new counselor (Karina Tomlinson, Ph.D.) from [id number]. He found this new counselor to be helpful.     The pt graduated from high school in 2018 and left [place] to attend college at the [employer]. Following an emergency room visit on 11/6/2018 for worsening depression, he was referred to [facility]. He established primary psychiatric care at [facility] under Jerrica Billingsley MD., where he endorsed SI and SIB (scratching).    He completed a neuropsychological evaluation on 11/01/2019 and 11/04/2019 (see encounter notes). He followed up with his PCP on 12/9/2019 and endorsed a history of auditory hallucinations (e.g., music, indistinct voices) and unusual cycles of high activity. He was then referred to the ESMD clinic in 2020 for diagnostic clarification.    ROIs for PCP medical, psychiatric, and counseling records from [facility], were obtained.       Depression: History is significant for MDD, severe recurring, with anxious distress. He described a history of cycles (2-3 weeks) of \"extreme low\" depressive symptoms of anhedonia, high irritability, disturbed sleep, low drive, feelings of worthlessness and inability to perform ADLs.  During these cycles he demonstrates passive SI with plan, and SIB (scratching extremities). These symptoms impede his ability to function socially, noting that his peers describe these periods as \"having to walk on eggshells around me.\" These low cycles are typically preceded by periods of extreme highs (see jose raul, below).     Jose Raul/hypomania:   Pt endorsed a history of cycles of \"high Alexandru\" where he experienced increased energy, decreased need for sleep, grandiose beliefs, risk-taking behavior (binge drinking, marijuana), pressured speech, increased periods of activity, and flight of ideas.  He also experienced auditory hallucinations of music and indistinct voices. His peers at school have increasingly expressed their concerns over his " "behavior. His faculty also noted their concern over recent academic performance (this coincided with a high/low cycle in November, 2019). During the high phase of this cycle, he entered his college residence patel and told the room that \"I want to put the Royal family in a meat cube\" and talked at length about \"how easy it would be to restore a monarchy in [place].\" Alexandru notes that during these high cycles, the content of his speech \"make perfect sense at the time but later I have no idea what the hell I was thinking.\"  He describes his awareness of these cycles in the moment as generally low. His friends and family insulate him during these times and \"have become used to them.\" These cycles were brought to his attention in 2019, but upon reflection he observed that this pattern of highs and lows emerged as a sophomore in high school.       Panic: Denied.    Anxiety: History is significant for diagnoses of generalized anxiety disorder and social anxiety disorder.     Obsessions: Denied.     Compulsions: Denied.     Trauma history: In April 2012, his maternal great-uncle completed suicide, which had a profoundly negative effect on the pt, as he reports that this event precipitated his mental health issues.     PTSD: Pt endorsed symptoms of trauma which he associates with the completed suicide of his maternal great-uncle. Denied other traumatic events.     Psychosis: Endorsed history of auditory hallucinations. Reported hearing music and indistinct voices without commands. Onset is unclear. Review of records pending.     Eating Disorder: Denied.    Substance use:  Alcohol: Generally denied issues with drinking but noted that mood instability does affect his judgement and sometimes leads to binge drinking during his high cycles.     Tobacco: Denied current use. Former smoker.     Cannabis: Generally denied recreational use of marijuana but noted that mood instability does affect his judgement and sometimes leads to " "smoking pot during his high cycles.      PSYCHIATRIC AND SUBSTANCE USE TREATMENT HISTORY      Psychiatric medications: Will be assessed by Eliane Sullivan PharmD at appointment scheduled for 1 week from today     Psych Inpatient Hospitalizations: Unclear per inconsistent pt report. Review of records is in-progress.       Therapy: Alexandru began seeing a counselor in 7th grade but felt that this experience was not helpful and described it as \"marginalizing and infantilizing.\" In 2017 he saw Karina Tomlinson, Ph.D. for one year before moving to [place]. He described this relationship as \"much better.\"     Other significant medical problems: History is remarkable for premature birth (8 months gestation). Current medical concerns of obstructive sleep apnea, migraine with aura, and tremor.    SOCIAL AND FAMILY HISTORY      Family Environment: Family is notable as both parents are physicians.   Academic: Had difficulty with paying attention during class due to distractibility. Excelled in language arts, biology, and history. Mathematical deficits that required great effort to obtain \"C\" grades. Social issues with peers. His academic performance in college has suffered recently due to unmanaged symptoms.  Occupation/ Financial Support: Unknown.  Children: No children.  Marital status: Single.  Legal: Denied, but stated \"I have no idea how I didn't get arrested in middle school,\" referring to disciplinary actions around \"groping girls.\"     MENTAL STATUS EXAM                                                                                       Alertness: Alert and oriented to self, date/time, place.  Appearance: Appropriately dressed but disheveled. Hair and beard were unkempt.  Behavior/Demeanor: Behavior was WNL for culture. Expansive, amiable demeanor.   Speech: Loquacious, tangential, pressured.   Language: Articulate with an above-average fund of knowledge. No semantic/phenomic deficits.    Psychomotor: Moderate " "psychomotor agitation and restlessness in all extremeties.   Mood: Expansive.  Affect: Grandiose. Congruent, full range of affect. Tearful when describing  family.   Thought Process/Associations: Tangential with loose associations.   Thought Content:  Reports transient suicidal ideation with plan and no intent;  Denies violent ideation  Perception:  Reports none.   Insight: Good.  Judgment: Fair.  Cognition: Grossly intact. Recent neuropsychological testing noted verbal comprehension in the very superior range.    Gait and Station: Unremarkable     Risk Assessment: Pt endorsed history of x2 suicide attempts in adolescence () but was not able to recall the details of how and when. Family history is significant for his maternal great uncle's completed suicide in . He endorsed baseline transient SI with specific plan (jump off [address] into path of moving lightrail) with low intent to act (\"less than a 10% chance\") His urges, SI, and SIB occur most frequently during his depressed cycles which last 1-3 weeks.     At the time of writing, the pt appears to be a low risk of suicide but his unmanaged mood cycling warrants observation. Resumption of counseling services is strongly recommended.     His presentation and symptoms were discussed with the writer's supervisor VALERY prior to sending the pt home. Pt was provided with COPE/clinic resources and he agreed to seek help as-needed. The pt asked if he could pursue counseling services with [facility] upon completion of the ESMD evaluation.     PSYCHOLOGICAL TESTING      [employer] International Neuropsychiatric Interview  Patient Health Questionnaire-9 (PHQ-9)  Generalized Anxiety Disorder (RACHEL-7) scale  Sabrina Screening Instrument for Borderline Personality Disorder  Perceived Stress Scale (PSS)   PTSD Checklist (PCL) - 5  Adult ADHD Self-Report Screening Scale (ASRS-5)  PROMIS Sleep Disturbance Short Form  Mood Disorder Questionnaire (MDQ)  Drug Abuse " Screening Test, DAST-10  Brief [place] Alcoholism Screening Test (MAST)     Patient Health Questionnaire-9 (PHQ-9)  Raw score= 10                     Interpretation= Moderate depression.  Self-reported functional difficulty: Somewhat difficult.                                              Generalized Anxiety Disorder (RACHEL-7) scale  Raw score= 19                       Interpretation= Severe anxiety.  Self-reported distress: Somewhat difficult.                                                                Sabrina Screening Instrument for Borderline Personality Disorder  Raw score= 8         Interpretation= Likely to meet criteria for BPD.     Perceived Stress Scale (PSS)  Raw score= 20                    Interpretation=  Moderate stress.     PTSD Checklist (PCL) - 5  Raw score= 51                       Interpretation=   Likely to meet criteria for PTSD.     Adult ADHD Self-Report Screening Scale (ASRS-5)  Raw score= 10                                               Elevated symptom count= 2 / 6                           PROMIS Sleep Disturbance Short Form      Raw score= 24                     Interpretation= None to slight.  T-score: 54.3                                           Mood Disorder Questionnaire (MDQ)  Raw score: 15              Interpretation= Positive screen. R/o bipolar disorder indicated.   Self-reported distress: Moderate problem      Drug Abuse Screening Test, DAST-10  Raw score: 3               Interpretation= Moderate level, further evaluation indicated.                                        Brief [place] Alcoholism Screening Test (MAST)  Raw score: 2             Interpretation= Not clinically significant.      Interpretation of Psychological Test     -Alexandru reported clinically significant anxiety and depression in the moderate range of severity and a moderate-to-severe level of stress.  -His sleep disturbance is none to slight.  -He may meet the criteria for borderline personality disorder but  further evaluation is indicated.  -He is likely to meet the criteria for bipolar disorder.  -He may have some issues with problematic drug use, further evaluation is indicated, but should be weighed against his use during manic cycles.  -The presence of alcohol use disorder is not indicated.   -He endorsed symptoms of PTSD that are clinically significant.    -He endorsed two clinically significant symptoms of ADHD.       PSYCHIATRIC DIAGNOSES & PLAN                                                                                               Provisional DSM-5 diagnoses:  -Bipolar Disorder I with mood-incongruent psychotic features, in partial remission, with most current episode depressed, severe (296.55) (F31.75).  -Generalized Anxiety Disorder (300.2) (F41.1).  -R/o PTSD     Alexandru has an appointment next week in our clinic to complete a psychopharmacological assessment. Complete treatment recommendations will be provided to him during the feedback session scheduled for two weeks from today.      I did not see this pt directly. This pt was discussed with me in individual psychotherapy supervision, and I agree with the plan as documented.     Merlene Medina, Ph.D., L.P.

## 2020-02-05 ENCOUNTER — TELEPHONE (OUTPATIENT)
Dept: PSYCHIATRY | Facility: CLINIC | Age: 20
End: 2020-02-05

## 2020-02-05 NOTE — TELEPHONE ENCOUNTER
On 2/4/2020 the patient signed an ANA MARÍA authorizing medical records to be released from Ruben Fuentes to [facility] for the purpose of continuing care. I faxed the request to [phone]. I sent the original to ANA MARÍA to scanning and kept a copy in psychiatry until scanning is complete.  Clarisa Lubin, CMA

## 2020-02-11 ENCOUNTER — OFFICE VISIT (OUTPATIENT)
Dept: PHARMACY | Facility: CLINIC | Age: 20
End: 2020-02-11
Payer: COMMERCIAL

## 2020-02-11 ENCOUNTER — OFFICE VISIT (OUTPATIENT)
Dept: PSYCHIATRY | Facility: CLINIC | Age: 20
End: 2020-02-11
Attending: NURSE PRACTITIONER
Payer: COMMERCIAL

## 2020-02-11 VITALS
HEART RATE: 111 BPM | DIASTOLIC BLOOD PRESSURE: 95 MMHG | WEIGHT: 244 LBS | SYSTOLIC BLOOD PRESSURE: 144 MMHG | BODY MASS INDEX: 34.03 KG/M2

## 2020-02-11 DIAGNOSIS — F39 MOOD DISORDER (H): Primary | ICD-10-CM

## 2020-02-11 DIAGNOSIS — F31.9 BIPOLAR I DISORDER (H): Primary | ICD-10-CM

## 2020-02-11 PROCEDURE — 99207 ZZC NO CHARGE LOS: CPT | Performed by: PHARMACIST

## 2020-02-11 PROCEDURE — G0463 HOSPITAL OUTPT CLINIC VISIT: HCPCS | Mod: ZF

## 2020-02-11 ASSESSMENT — PAIN SCALES - GENERAL: PAINLEVEL: NO PAIN (0)

## 2020-02-11 NOTE — PROGRESS NOTES
Therapy Management:                                                    Alexandru Mitchell is a 19 year old male coming in for a therapy management visit.  He was referred to me from Early Stage Mood Disorder (ESMD) Program.     Reason for Consult: ESMD intake and medication review    Discussion:   Current medications:   Effexor 150mg daily  Lamictal 200mg daily   Trazodone 100mg HS  Perphenazine-amitriptyline 2-10mg HS  Metoprolol 50mg daily for tachycardia  Nortriptyline 25mg HS possibly for migraine ppx    Alexandru is here today for continuation of ESMD intake. He met with Merlene Medina, PhD 2/4/20 for Diagnostic Assessment (DA). He will meet with ZO Rodríguez CNP immediately following MTM visit today. He has past diagnoses of MDD and RACHEL. However he is here for evaluation of possible bipolar disorder due to h/o jose raul symptoms including increased energy, decreased need for sleep, grandiose beliefs, risk-taking behavior (binge drinking, marijuana), pressured speech, increased periods of activity, and flight of ideas.  He also experienced auditory hallucinations of music and indistinct voices per DA.  See notes from Merlene Medina, PhD and ZO Rodríguez, AMBER for additional details regarding symptoms, history, and diagnostic impressions.     Today, time was spent reviewing current and past medication trials. Alexandru is originally from [place], moved to [place] Fall 2018 to attend the [employer]. He had an established psychiatrist (Dr. Ruben Fuentes) in [place] from [id number], but has not established psychiatry care in [place]. Alexandru established with PCP Jerrica Billingsley in 2019 who has been refilling psychiatry medications. His medications have remained mostly unchanged since 2017/2018.     Alexandru reports generally tolerating his medication regimen well and denies any side effect concerns. In regards to medication efficacy, he continues to have mood cycling with symptoms concerning for jose raul/hypomania and depression on his current  "regimen. ESMD assessment is not yet complete, but if bipolar dx is made, pt would likely benefit from initiating a medication with antimania coverage (ie lithium, Seroquel) and tapering/discontinuing his antidepressants d/t risk of exacerbating jose raul. Would also consider discontinuing perphenazine-amitriptyline given unclear indication/efficacy, duplicative TCAs (amitrip and nortrip) and risk of FGA/TCA side effects. Case will be discussed with ESMD team and recommendations made to patient on 2/18/20 upon completion of assesment.     Past medication trials (per pt, outside records not available):   2012 - Propranolol by PCP for anxiety - not helpful    2014/9897-0911, Prozac (dose unknown) mainly for anxiety. Pt reports little to no symptom reduction with Prozac. Does recall increased suicidality before and during Prozac (compared to now), but does not think Prozac worsened suicidality or irritability.      March/April 2017 established with psychiatrist, Ruben Fuentes in [place]:  During 2017 was started on:   - venlafaxine, possibly was up to 225mg daily, but decreased to 150mg after ~6 months d/t increased irritability and \"feeling numb, no emotion\".  Denies worsening SI, unsure if mood cycling worsened.   - lamotrigine, 200mg is highest dose. Started for \"mood cycling\", psychiatrist didn't diagnose him with bipolar.    - perphenazine-amitriptyline 2-10mg. Started possibly for sleep/anxiety.  Pt does endorse hallucinations during periods of depression and jose raul, denies hallucinations when euthymic.     2316-5545 amitriptyline (without perphenazine)(dose unknown) - discontinued d/t urinary retention  2018- metoprolol started for tachycardia   2018 - nortriptyline started by neuro for migraine ppx. Saw neuro x2 in [place]. Alexandru has issues with migraines on and off. They occur an average of ~2x/month and last 2 days on average.       Plan:  1. Full MTM not covered by pt's insurance. Med rec completed today.  2. Case will be " discussed to ESMD team upon completion of assessment. Some initial medication considerations (pending diagnostic clarity) may include: starting an antimania agent (lithium, Seroquel), tapering antidepressants, stopping perphenazine-amitriptyline.       Eliane Sullivan, PharmD, BCPP  Medication Therapy Management Pharmacist  [facility]  [phone]

## 2020-02-13 ENCOUNTER — TELEPHONE (OUTPATIENT)
Dept: PSYCHIATRY | Facility: CLINIC | Age: 20
End: 2020-02-13

## 2020-02-15 NOTE — PROGRESS NOTES
"  Psychiatry Clinic New Patient Medication Evaluation                                           Alexandru Mitchell is a 19 year old male who prefers the name Alexandru and pronoun he, him, his.  Therapist: None   PCP: Jerrica Billingsley  Other Providers: None  Referred by self for evaluation of depression and possible bipolar disorder.     History was provided by patient who was a fair historian.     Chief Complaint                                                                                                        \" panic attack and jose raul \"      History of Present Illness                                                                                4, 4     Alexandru is a 19-year-old male with prior diagnoses of major depressive disorder, severe recurrent with anxious distress, generalized anxiety disorder, and social anxiety disorder.  He presented for the medical evaluation as part of the intake process for the [facility].     Mr. Mitchell reports a history of mental health symptoms starting at as early as the 5th grade with symptoms of depression.  More significant symptoms began occurring at age 12 in the 7th grade, following traumatic loss of great uncle to suicide.  Following this event he began experiencing symptoms of depression and anxiety.  He sought individual therapy at this time, did not find it to be helpful.      He reports experiencing periods of high energy with 3-4 hours per sleep per night in the 7th grade.  These episodes occur approx. 3 times per year, lasting 1-2 weeks, and have become more severe in recent years.  He describes episodes of  happy Alexandru , characterized by increased energy, decreased sleep, inflated self-esteem and grandiosity (funniest person in the room,  I can completely reform the world,  and believing that he could  systematically de-brielle  a  who he believed was a god); increased goal directed behavior (increased social drive, creating Tinder accounts and " going on more dates, attempting to learn Korean alphabet within a few hours); pressured speech, racing thoughts and flight of ideas; hypersexuality (i.e excessive masturbation), and increased spending on purchases he would not otherwise make.  He reports odd behaviors occurring during these periods (dissolved book with Barbara in his backyard), and destructive behaviors from agitation, such as pulling his front door off of the house.  Mood during these episodes gradually builds into euphoria and then becomes agitated and irritable.  During episode of jose raul he additionally reports symptoms of psychosis including auditory hallucinations (voice of  relatives) and beliefs/paranoid ideation that he is going to be  found out  for having special ogden.  He has never received treatment for an episode, and reports these resolve on their own.  He also report that friends/family have never tried to intervene during an episode.  Most recent manic episode was from 10/17 - 10/31/19.    Following these episodes, he reports mood changes abruptly to depression.  Episodes of depression will last approx. 2 weeks and are charactized by low mood, isolating in his room (often not even leaving to use the bathroom),  hypersomnia, appetite changes (either decreased or increased appetite), feelings of guilt and worthlessness, having  no motivation to do anything.   He endorses death and suicidal ideation during episodes of depression.  He also endorses AH.  Most recent episode of depression followed manic episode in October, and he reports this was precipitated by a girl he had been dating confessed to him that she was asexual which triggered his depressive episode.       He denies any symptoms of psychosis occurring outside of mood episodes.   He reports having panic attacks described as  freezing can t move  and palpitations, usually lasting 15-30 minutes, but can last up to 1-2 hours.  Panic attacks may either come out of the blue  "or are precipitated by social interactions.        He reports current mood is characterized by anxiety.  Denies current depression, death ideation or SI.      Recent Symptoms:   Depression:  suicidal ideation with plan, without intent, depressed mood, anhedonia, low energy, hypersomnia, appetite changes, poor concentration /memory, feeling worthless, excessive guilt and psychomotor changes [more restlessness]  Elevated:  increased energy, decreased sleep need, increased activity, grandiosity, distractibility , racing thoughts, pressured speech and excessive spending  Psychosis:  delusions and auditory hallucinations  Anxiety:  excessive worry and social anxiety       Recent Substance Use:  Did not assess at this time.      Substance Use History     Past Use- Alcohol- yes  and Cannabis- yes      Psychiatric History     Suicidal ideation- getting hit by lightrail and    Suicide Attempt:   #- 3   Most Recent- 16 year old  SIB- cutting and scratching   Mary Beth- first episode- 12 years old    Psychosis- AH, paranoia         Psychiatric Medication Trials     From [facility] note from Eliane Sullivan:     2012 - Propranolol by PCP for anxiety - not helpful     2014/9538-6477, Prozac (dose unknown) mainly for anxiety. Pt reports little to no symptom reduction with Prozac. Does recall increased suicidality before and during Prozac (compared to now), but does not think Prozac worsened suicidality or irritability.       March/April 2017 established with psychiatrist, Ruben Fuentes in [place]:  During 2017 was started on:   - venlafaxine, possibly was up to 225mg daily, but decreased to 150mg after ~6 months d/t increased irritability and \"feeling numb, no emotion\".  Denies worsening SI, unsure if mood cycling worsened.   - lamotrigine, 200mg is highest dose. Started for \"mood cycling\", psychiatrist didn't diagnose him with bipolar.    - perphenazine-amitriptyline 2-10mg. Started possibly for sleep/anxiety.  Pt does endorse hallucinations " during periods of depression and jose raul, denies hallucinations when euthymic.     9118-4276 amitriptyline (without perphenazine)(dose unknown) - discontinued d/t urinary retention    2018- metoprolol started for tachycardia     2018 - nortriptyline started by neuro for migraine ppx.                                                        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     Medical / Surgical History     Patient Active Problem List   Diagnosis     Major depressive disorder, recurrent episode, severe with anxious distress (H)     Generalized anxiety disorder     Social anxiety disorder     Migraine with aura, not intractable, without status migrainosus       Past Surgical History:   Procedure Laterality Date     HEAD & NECK SURGERY       TONSILLECTOMY           Medical Review of Systems                                                                                                    2, 10     A comprehensive review of systems was performed and is negative other than noted in the HPI.     Allergy   Patient has no known allergies.   Current Medications     Current Outpatient Medications   Medication Sig Dispense Refill     lamoTRIgine (LAMICTAL) 200 MG tablet Take 1 tablet (200 mg) by mouth daily 30 tablet 1     metoprolol succinate ER (TOPROL-XL) 50 MG 24 hr tablet Take 1 tablet (50 mg) by mouth daily 30 tablet 1     nortriptyline (PAMELOR) 25 MG capsule Take 1 capsule (25 mg) by mouth At Bedtime 30 capsule 1     perphenazine-amitriptyline 2-10 MG TABS per tablet Take 1 tablet by mouth At Bedtime 30 tablet 1     traZODone (DESYREL) 50 MG tablet Take 2 tablets (100 mg) by mouth At Bedtime 60 tablet 1     venlafaxine (EFFEXOR-ER) 75 MG 24 hr tablet Take 2 tablets (150 mg) by mouth daily 60 tablet 1      Vitals                                                                                                                         3, 3     BP (!) 144/95   Pulse 111   Wt 110.7 kg (244 lb)   BMI 34.03 kg/m        Mental Status Exam                                                                                   9, 14 cog gs     Alertness: alert  and oriented  Appearance: casually groomed  Behavior/Demeanor: cooperative, with good  eye contact   Speech: pressured  Language: intact  Psychomotor: restless and fidgety  Mood: anxious  Affect: full range; was congruent to mood; was congruent to content  Thought Process/Associations: circumstantial  Thought Content:  Reports suicidal ideation with plan; without intent [details in Interim History], delusions [details in Interim History] and paranoid ideation;  Denies violent ideation  Perception:  Reports auditory hallucinations;  Denies visual hallucinations  Insight: fair  Judgment: limited  Cognition: (6) attention span: limited  concentration: limited  remote memory: limited  Gait and Station: unremarkable     Labs and Data     Rating Scales:    PHQ9    PHQ9 Today:  See flowsheet if completed   PHQ-9 SCORE 12/9/2019   PHQ-9 Total Score 15         No lab results found.  No lab results found.    Diagnosis, Assessment & Plan                                                                            m2, h3     Provisional DSM-5 diagnoses:    -Bipolar Disorder I with psychotic features, in partial remission, with most current episode depressed, severe   -Generalized Anxiety Disorder with panic attacks, r/o panic disorder  -R/O trauma and stressor related disorder  -R/O cluster B personality traits    Assessment  Alexandru Mitchell is a 19-year-old male who was self-referred for ongoing in depression symptoms and episodes of elevated energy.  In 2018, he admitted himself to the [facility] for worsening depression and suicidal ideation.  He reports 3 past suicide attempts with ETOH and OTC medication, none of which came to medical attention, and past history of self-injurious  behaviors such as cutting and scratching.  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      Provisionally, his symptoms seem most consistent with bipolar disorder I with psychotic features and generalized anxiety disorder with panic attacks (vs panic disorder).  He reports traumatic loss of family member to suicide at age 11, which precipitated worsening of MH symptoms, and symptoms of a trauma/stressor related disorder should be further assessed.  Due to time constraints, we did not fully assess cluster B personality traits, however due to positive screen on Gonzalez Screening Instrument for BPD and his report of extreme emotional distress with rejection (including suicide attempts following rejection) borderline personality disorder should be further assessed.  Given the high likelihood of bipolar disorder, recommendations include discontinuation off of Effexor, and initiation of a mood stabilizing medication.  He was hesitant to make any changes to his medications today.        RTC: 1 week or sooner if needed     CRISIS NUMBERS:   Provided routinely in AVS.    Treatment Risk Statement:  The patient understands the risks, benefits, adverse effects and alternatives. Agrees to treatment with the capacity to do so. No medical contraindications to treatment. Agrees to call clinic for any problems. The patient understands to call 911 or go to the nearest ED if life threatening or urgent symptoms occur.         This note was written by   Lalo Mayorga RN, Psychiatric/Mental Health Nurse Practitioner Student, for Nataliya Dominique CNP, [facility]    I, Nataliya Dominique, was present for the entire clinical encounter and performed the key aspects of the assessment, including treatment planning and recommendations.       PROVIDER:  ZO Jackman CNP

## 2020-02-18 ENCOUNTER — OFFICE VISIT (OUTPATIENT)
Dept: PSYCHIATRY | Facility: CLINIC | Age: 20
End: 2020-02-18
Attending: NURSE PRACTITIONER
Payer: COMMERCIAL

## 2020-02-18 ENCOUNTER — TELEPHONE (OUTPATIENT)
Dept: PSYCHIATRY | Facility: CLINIC | Age: 20
End: 2020-02-18

## 2020-02-18 ENCOUNTER — OFFICE VISIT (OUTPATIENT)
Dept: PHARMACY | Facility: CLINIC | Age: 20
End: 2020-02-18
Payer: COMMERCIAL

## 2020-02-18 ENCOUNTER — OFFICE VISIT (OUTPATIENT)
Dept: PSYCHIATRY | Facility: CLINIC | Age: 20
End: 2020-02-18
Attending: PSYCHIATRY & NEUROLOGY
Payer: COMMERCIAL

## 2020-02-18 ENCOUNTER — ALLIED HEALTH/NURSE VISIT (OUTPATIENT)
Dept: PSYCHIATRY | Facility: CLINIC | Age: 20
End: 2020-02-18
Payer: COMMERCIAL

## 2020-02-18 VITALS
WEIGHT: 249 LBS | HEART RATE: 80 BPM | DIASTOLIC BLOOD PRESSURE: 96 MMHG | SYSTOLIC BLOOD PRESSURE: 126 MMHG | BODY MASS INDEX: 34.73 KG/M2

## 2020-02-18 DIAGNOSIS — Z79.899 HIGH RISK MEDICATIONS (NOT ANTICOAGULANTS) LONG-TERM USE: Primary | ICD-10-CM

## 2020-02-18 DIAGNOSIS — F31.9 BIPOLAR I DISORDER (H): Primary | ICD-10-CM

## 2020-02-18 DIAGNOSIS — F31.9 BIPOLAR AFFECTIVE DISORDER, REMISSION STATUS UNSPECIFIED (H): ICD-10-CM

## 2020-02-18 LAB
ANION GAP SERPL CALCULATED.3IONS-SCNC: 9 MMOL/L (ref 3–14)
BUN SERPL-MCNC: 13 MG/DL (ref 7–30)
CALCIUM SERPL-MCNC: 9.1 MG/DL (ref 8.5–10.1)
CHLORIDE SERPL-SCNC: 108 MMOL/L (ref 94–109)
CO2 SERPL-SCNC: 23 MMOL/L (ref 20–32)
CREAT SERPL-MCNC: 0.72 MG/DL (ref 0.66–1.25)
GFR SERPL CREATININE-BSD FRML MDRD: >90 ML/MIN/{1.73_M2}
GLUCOSE SERPL-MCNC: 103 MG/DL (ref 70–99)
POTASSIUM SERPL-SCNC: 3.8 MMOL/L (ref 3.4–5.3)
SODIUM SERPL-SCNC: 140 MMOL/L (ref 133–144)
TSH SERPL DL<=0.005 MIU/L-ACNC: 1.17 MU/L (ref 0.4–4)

## 2020-02-18 PROCEDURE — 36415 COLL VENOUS BLD VENIPUNCTURE: CPT | Performed by: NURSE PRACTITIONER

## 2020-02-18 PROCEDURE — G0463 HOSPITAL OUTPT CLINIC VISIT: HCPCS | Mod: ZF

## 2020-02-18 PROCEDURE — 80048 BASIC METABOLIC PNL TOTAL CA: CPT | Performed by: NURSE PRACTITIONER

## 2020-02-18 PROCEDURE — 84443 ASSAY THYROID STIM HORMONE: CPT | Performed by: NURSE PRACTITIONER

## 2020-02-18 RX ORDER — VENLAFAXINE HYDROCHLORIDE 37.5 MG/1
112.5 CAPSULE, EXTENDED RELEASE ORAL DAILY
Qty: 90 CAPSULE | Refills: 1 | Status: SHIPPED | OUTPATIENT
Start: 2020-02-18 | End: 2020-09-03

## 2020-02-18 RX ORDER — LITHIUM CARBONATE 300 MG
300 TABLET ORAL 2 TIMES DAILY
Qty: 60 TABLET | Refills: 1 | Status: SHIPPED | OUTPATIENT
Start: 2020-02-18 | End: 2020-02-25

## 2020-02-18 ASSESSMENT — PAIN SCALES - GENERAL: PAINLEVEL: NO PAIN (0)

## 2020-02-18 NOTE — PROGRESS NOTES
Explanation of Findings Visit  [facility]    Patient Name:  Alexandru Mitchell  /Age:  2000 (19 year old)    Date/Time of Findings Meeting:  2020  Length of Actual Contact: 35 minutes (9:20-9:55)    Individuals Present:    Client: Yes  Prescriber: ZO Rodríguez, CNP  Psychologist: Merlene Medina, PhD, LP; Dewey Murcia (psychology practicum student)  Other: Mary Jefferson, Supported Employment and     Initial DSM-5 Diagnosis(es):   -Bipolar Disorder I with psychotic features, in partial remission, with most current episode depressed, severe   -Generalized Anxiety Disorder with panic attacks, r/o panic disorder  -R/O trauma and stressor related disorder  -R/O cluster B personality traits    Interventions: Provided treatment recommendations and rationale behind them.   >Provided psychoeducation about his diagnoses.    >Explored his understanding of diagnoses, provided support, and answered questions.   >Interpretation and explanation of results of psychological testing  >Discussed the role of psychotherapy in treating symptoms, and made specific recommendations  >Explored pt/family adjustment to illness    Recommendations:  1) Regarding medication, see follow-up note by ZO Rodríguez, CNP that immediately followed this feedback session.  2) Mary Jefferson, Supported Employment and , will schedule follow-up meeting to assist with academic difficulties  3) Recommend continue with his current therapist to engage in CBT    Please call or EPIC message for questions or concerns related to the above information.     Provider:   Merlene Medina, PhD, LP

## 2020-02-18 NOTE — TELEPHONE ENCOUNTER
Writer received incoming phone call from outpatient lab.  calling to gather information regarding lithium that was received after patient's blood draw today. Technician wanted to know if patient is coming back to complete this, as the blood from this morning can no longer be used.     Reviewed the provider's office visit note and confirmed the pt is supposed to complete the lithium level in 1 week. Relayed this to the technician. He will change the order to future to avoid any further confusion. No action needed by this writer.

## 2020-02-18 NOTE — PROGRESS NOTES
"  Psychiatry Clinic Progress Note                                        Alexandru Mitchell is a 19 year old male who prefers the name Alexandru and pronoun he, him, his.  Therapist: None   PCP: Jerrica Billingsley  Other Providers: None  Referred by self for evaluation of depression and possible bipolar disorder.     History was provided by patient who was a good historian.     Chief Complaint                                                                                                        \" bipolar disorder \"      History of Present Illness                                                                                4, 4     Alexandru is a 19-year-old male with recently diagnosed bipolar 1 disorder, RACHEL, panic attacks and r/o trauma related symptoms and BPD symptoms.    He is seen by me today immediately following our team feedback session from the [facility] evaluation, see note by Dr. Medina.  He reports being much less anxious in the visit today due to less pressure re: not needing to be interviewed in depth.  No significant change in mood or anxiety since last week.  He has several appropriate questions re: medications and side effects.  He denies current persistently low mood, anhedonia, depression or SI.  Denies current symptoms of jose raul or psychosis.   Continues to be a full time student at [employer] and is working with individual therapist with whom he has a good relationship.  Is struggling in his classes secondary to missed classes with mental health exacerbations, and due to panic attacks.  He will be working with our SEE to help with accommodations.    He is following up with PCP re: elevated blood pressure.      Psychiatric Review of Symptoms:   Depression:  denies peristent depression or anhedonia, denies current SI, intent or plan.  Elevated:  none  Psychosis:  none  Anxiety:  excessive worry and social anxiety, panic attacks        Recent Substance Use:  None reported       Substance Use History     Past Use- Alcohol- " "yes  and Cannabis- yes      Psychiatric History     Suicide Attempt:   #- 3 by overdose on OTC medications; no medical attention sought.  Most Recent- 16 year old  SIB- cutting and scratching   Mary Beth- first episode- possibly as young as 12 years old characterized by hypomania.  Most recent manic episode in 10/2019.    Psychosis- AH, paranoia  Only during manic episodes.      Social History  FINANCIAL SUPPORT- UNKNOWN       CHILDREN- None       EARLY HISTORY/ EDUCATION- Currently student at [employer] studying [employer]    Family MH hx - maternal grandfather and great uncle with bipolar disorder.  Maternal great uncle  by suicide        Psychiatric Medication Trials     From [facility] note from Eliane Sullivan:      - Propranolol by PCP for anxiety - not helpful     /[id number], Prozac (dose unknown) mainly for anxiety. Pt reports little to no symptom reduction with Prozac. Does recall increased suicidality before and during Prozac (compared to now), but does not think Prozac worsened suicidality or irritability.       2017 established with psychiatrist, Ruben Fuentes in [place]:  During 2017 was started on:   - venlafaxine, possibly was up to 225mg daily, but decreased to 150mg after ~6 months d/t increased irritability and \"feeling numb, no emotion\".  Denies worsening SI, unsure if mood cycling worsened.   - lamotrigine, 200mg is highest dose. Started for \"mood cycling\", psychiatrist didn't diagnose him with bipolar.    - perphenazine-amitriptyline 2-10mg. Started possibly for sleep/anxiety.  Pt does endorse hallucinations during periods of depression and mary beth, denies hallucinations when euthymic.     [id number] amitriptyline (without perphenazine)(dose unknown) - discontinued d/t urinary retention    - metoprolol started for tachycardia      - nortriptyline started by neuro for migraine ppx.                                                          Medical / Surgical History     Patient Active Problem " List   Diagnosis     Major depressive disorder, recurrent episode, severe with anxious distress (H)     Generalized anxiety disorder     Social anxiety disorder     Migraine with aura, not intractable, without status migrainosus       Past Surgical History:   Procedure Laterality Date     HEAD & NECK SURGERY       TONSILLECTOMY           Medical Review of Systems                                                                                                    2, 10     A comprehensive review of systems was performed and is negative other than noted in the HPI.     Allergy   Patient has no known allergies.   Current Medications     Current Outpatient Medications   Medication Sig Dispense Refill     lamoTRIgine (LAMICTAL) 200 MG tablet Take 1 tablet (200 mg) by mouth daily 30 tablet 1     lithium (ESKALITH) 300 MG tablet Take 1 tablet (300 mg) by mouth 2 times daily 60 tablet 1     metoprolol succinate ER (TOPROL-XL) 50 MG 24 hr tablet Take 1 tablet (50 mg) by mouth daily 30 tablet 1     nortriptyline (PAMELOR) 25 MG capsule Take 1 capsule (25 mg) by mouth At Bedtime 30 capsule 1     perphenazine-amitriptyline 2-10 MG TABS per tablet Take 1 tablet by mouth At Bedtime 30 tablet 1     traZODone (DESYREL) 50 MG tablet Take 2 tablets (100 mg) by mouth At Bedtime 60 tablet 1     venlafaxine (EFFEXOR XR) 37.5 MG 24 hr capsule Take 3 capsules (112.5 mg) by mouth daily 90 capsule 1     venlafaxine (EFFEXOR-ER) 75 MG 24 hr tablet Take 2 tablets (150 mg) by mouth daily (Patient not taking: Reported on 2/18/2020) 60 tablet 1      Vitals                                                                                                                        3, 3     See flow sheet      Mental Status Exam                                                                                   9, 14 cog gs     Alertness: alert  and oriented  Appearance: casually groomed  Behavior/Demeanor: cooperative, with good  eye contact   Speech:  regular rate and rhythm  Language: intact  Psychomotor: normal or unremarkable  Mood: description consistent with euthymia  Affect: full range; was congruent to mood; was congruent to content  Thought Process/Associations: unremarkable, not circumstantial today  Thought Content:  Reports none;  Denies suicidal ideation, violent ideation and delusions  Perception:  Reports none;  Denies auditory hallucinations and visual hallucinations  Insight: adequate  Judgment: adequate for safety  Cognition: (6) attention span: intact  concentration: intact  recent memory: intact  remote memory: intact  fund of knowledge: appropriate  Gait and Station: unremarkable     Labs and Data       PHQ9 Today:  9  PHQ-9 SCORE 12/9/2019   PHQ-9 Total Score 15         Recent Labs   Lab Test 02/18/20  1057   CR 0.72   GFRESTIMATED >90     No lab results found.    Diagnosis, Assessment & Plan                                                                            m2, h3     Provisional DSM-5 diagnoses:    -Bipolar Disorder I with psychotic features, in partial remission, with most current episode depressed, severe   -Generalized Anxiety Disorder with panic attacks, r/o panic disorder  -R/O trauma and stressor related disorder  -R/O cluster B personality traits    Assessment  Alexandru Mitchell is a 19-year-old male who was self-referred for ongoing in depression symptoms and episodes of elevated energy.  In 2018, he admitted himself to the [facility] Department for worsening depression and suicidal ideation.  He reports 3 past suicide attempts with ETOH and OTC medication, none of which came to medical attention.  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      Provisionally, his symptoms seem most consistent with bipolar disorder I with psychotic features and generalized anxiety disorder with panic attacks (vs panic disorder).  He reports traumatic loss of family member to suicide at age 11, which  precipitated worsening of MH symptoms, and symptoms of a trauma/stressor related disorder should be further assessed.  He has a genetic loading for bipolar disorder (maternal grandfather and great uncle).  Due to time constraints, we did not fully assess cluster B personality traits, however due to positive screen on Gonzalez Screening Instrument for BPD and his report of extreme emotional distress with rejection (including suicide attempts following rejection) borderline personality disorder should be further assessed.  He currently is engaged in individual therapy, with a focus on CBT, which he plans to continue, especially for treatment of anxiety.   We reviewed several medication options for bipolar disorder today, as well as potential risks of antidepressant medications in the setting of bipolar disorder.  He would like to start lithium today (vs a SGA mood stabilizer due to concern re: weight gain).  Risks and benefits reviewed at length.  We will also begin taper off of effexor.  Long term plan will be to reduce polypharmacy to the extent possible.      Bipolar disorder  Start lithium 300 mg BID, may consolidate to at bedtime after 3 days  Continue lamictal 200 mg daily   Decrease Effexor to 112.5 mg daily.  We will decrease by 37.5 mg q1-2 weeks as tolerated until discontinuation    Psychosis symptoms  Continue Perphenazine-amitriptyline 2-10 mg tabs daily.  This will likely be discontinued next.  With better mood stabilization, antipsychotic treatment may not be necessary.      Insomnia  Limited trazodone use to as needed only    Anxiety  Metoprolol originally prescribed for tachycardia, will defer to PCP for management.    Recommended CBT for anxiety     Other psychiatric medications  Pamelor - use only as needed for headaches    Long term management of high risk medications  Ordered today: BMP, TSH.  Lithium level to be completed in 1 week    RTC: 1 week or sooner if needed     CRISIS NUMBERS:   Provided  routinely in AVS.    Treatment Risk Statement:  The patient understands the risks, benefits, adverse effects and alternatives. Agrees to treatment with the capacity to do so. No medical contraindications to treatment. Agrees to call clinic for any problems. The patient understands to call 911 or go to the nearest ED if life threatening or urgent symptoms occur.       PROVIDER:  ZO Jackman CNP

## 2020-02-18 NOTE — PATIENT INSTRUCTIONS
Thank you for coming to the PSYCHIATRY CLINIC.    Lab Testing:  If you had lab testing today and your results are reassuring or normal they will be mailed to you or sent through [employer] within 7 days.   If the lab tests need quick action we will call you with the results.  The phone number we will call with results is # [phone] (home) . If this is not the best number please call our clinic and change the number.    Medication Refills:  If you need any refills please call your pharmacy and they will contact us. Our fax number for refills is [phone]. Please allow three business for refill processing.   If you need to  your refill at a new pharmacy, please contact the new pharmacy directly. The new pharmacy will help you get your medications transferred.     Scheduling:  If you have any concerns about today's visit or wish to schedule another appointment please call our office during normal business hours [phone] (8-5:00 M-F)    Contact Us:  Please call [phone] during business hours (8-5:00 M-F).  If after clinic hours, or on the weekend, please call  [phone].    Financial Assistance [phone]  [facility] Billing [phone]  Central Billing Office, [facility]: [phone]  [facility] Billing [phone]  Medical Records [phone]      MENTAL HEALTH CRISIS NUMBERS:  [facility]:   [facility] - [phone]   Crisis Residence [facility] - [phone]   Walk-In Counseling [facility] [phone]   COPE 24/7 [place] Mobile Team for Adults - [[phone]]; Child - [[phone]]        [facility]:   [facility] - [phone]   Walk-in counseling [facility] - [phone]   Walk-in counseling [facility] - [phone]   Crisis Residence [facility] - [phone]   Urgent Care Adult Mental Health:   --Drop-in, 24/7 crisis line, and [place] Mobile Team  [[phone]]    CRISIS TEXT LINE: Text [phone] from anywhere, anytime, any crisis 24/7;    OR SEE www.crisistextline.org     National Suicide Prevention Lifeline: 447-614-WWXX (444-038-8281)    Poison Control Center - 9-507-399-3246    CHILD: [facility] needs assessment team - [phone]     [facility] Lifeline - 1-362.153.3767; or [facility] Lifeline - 1-473.452.6910    If you have a medical emergency please call 911or go to the nearest ER.                    _____________________________________________    Again thank you for choosing PSYCHIATRY CLINIC and please let us know how we can best partner with you to improve you and your family's health.  You may be receiving a survey regarding this appointment. We would love to have your feedback, both positive and negative. The survey is done by an external company, so your answers are anonymous.

## 2020-02-18 NOTE — PROGRESS NOTES
Pt was not seen by writer today. Pt arrived late (920AM) and writer had another pt at 930AM. Pt was seen by ZO Rodríguez and Merlene Medina, PhD.

## 2020-02-18 NOTE — PATIENT INSTRUCTIONS
Please get your labs done today.  You can start lithium tonight unless you get a phone call from the clinic by the end of the day.     Start lithium 300 mg twice daily for three days, then change to 600 mg at bedtime only  Decrease Effexor to 112.5 mg daily   If possible, try using your Pamelor only as needed for headaches  Only use trazodone if needed for insomnia    You will follow up in 1 week.  Try to get your lithium lab drawn in the morning before our visit - this should be 10-12 hours after your last lithium dose.      INSTRUCTIONS FOR USE OF LITHIUM      DO:    - call clinic if you, or another provider, makes any medication changes  - call clinic if your use of ibuprofen (or similar) increases at all  - call clinic if - call clinic if you experience any symptom listed below  - LABS:   obtain all labs as directed,  labs are done every 6 months on an ongoing basis;                 lithium level is drawn 5 days after starting lithium and after dose changes;                  labs also include kidney and thyroid function;                 all labs are drawn between 10 and 14 hours after your last dose (12hrs is ideal)      DO NOT:    - stop this med abruptly  - use street drugs or alcohol while being treated with lithium  - increase use of ibuprofen (or similar) without calling the clinic      TRY TO AVOID:    - any use of ibuprofen (or similar);  MAY use acetaminophen (Tylenol) for pain  - excessive sweating  - excessive salt intake  - use of over the counter herbal remedies      FOOD:  take with or without food but AVOID excessive salt intake      COMMON ADVERSE EFFECTS: acne, weight gain, nausea, tremor, increased urination, increased thirst, sedation, loose stools, blurring of memory and concentration      CALL CLINIC URGENTLY or EMERGENCY ROOM:  if you have any concerns, especially the following...  - blurred vision  - heart palpitations  - ear ringing  - excessive urination (especially at night)  OR   excessive thirst  - seizure  - kidney problems or any new medical problem  - thoughts of death or hurting yourself    - suspect Serotonin Syndrome:   confusion   tremor  severe headache  sweats  fever   funny feeling in muscles  need to pace / restlessness   severe nausea, vomiting  diarrhea

## 2020-02-18 NOTE — NURSING NOTE
Pt late, Patient taken to treatment room before rooming/vitals done today.   Katiuska Lubin, CMA

## 2020-02-25 ENCOUNTER — OFFICE VISIT (OUTPATIENT)
Dept: PSYCHIATRY | Facility: CLINIC | Age: 20
End: 2020-02-25
Attending: NURSE PRACTITIONER
Payer: COMMERCIAL

## 2020-02-25 ENCOUNTER — TELEPHONE (OUTPATIENT)
Dept: PSYCHIATRY | Facility: CLINIC | Age: 20
End: 2020-02-25

## 2020-02-25 ENCOUNTER — ALLIED HEALTH/NURSE VISIT (OUTPATIENT)
Dept: PSYCHIATRY | Facility: CLINIC | Age: 20
End: 2020-02-25
Payer: COMMERCIAL

## 2020-02-25 VITALS
HEART RATE: 101 BPM | BODY MASS INDEX: 33.81 KG/M2 | WEIGHT: 242.4 LBS | SYSTOLIC BLOOD PRESSURE: 144 MMHG | DIASTOLIC BLOOD PRESSURE: 94 MMHG

## 2020-02-25 DIAGNOSIS — F31.9 BIPOLAR AFFECTIVE DISORDER, REMISSION STATUS UNSPECIFIED (H): ICD-10-CM

## 2020-02-25 DIAGNOSIS — F31.9 BIPOLAR AFFECTIVE DISORDER, REMISSION STATUS UNSPECIFIED (H): Primary | ICD-10-CM

## 2020-02-25 DIAGNOSIS — F31.9 BIPOLAR 1 DISORDER (H): Primary | ICD-10-CM

## 2020-02-25 PROCEDURE — G0463 HOSPITAL OUTPT CLINIC VISIT: HCPCS | Mod: ZF

## 2020-02-25 RX ORDER — LITHIUM CARBONATE 300 MG
900 TABLET ORAL DAILY
Qty: 90 TABLET | Refills: 1 | Status: SHIPPED | OUTPATIENT
Start: 2020-02-25 | End: 2020-03-31

## 2020-02-25 RX ORDER — LITHIUM CARBONATE 300 MG
900 TABLET ORAL DAILY
Qty: 60 TABLET | Refills: 1 | Status: SHIPPED | OUTPATIENT
Start: 2020-02-25 | End: 2020-02-25

## 2020-02-25 ASSESSMENT — PAIN SCALES - GENERAL: PAINLEVEL: MODERATE PAIN (5)

## 2020-02-25 NOTE — PROGRESS NOTES
"  Psychiatry Clinic Progress Note                                        Alexandru Mitchell is a 19 year old male who prefers the name Alexandru and pronoun he, him, his.  Therapist: None   PCP: Jerrica Billingsley  Other Providers: None  Referred by self for evaluation of depression and possible bipolar disorder.     History was provided by patient who was a good historian.     Chief Complaint                                                                                                        \" improved with lithium \"      History of Present Illness                                                                                4, 4     Alexandru is a 19-year-old male with recently diagnosed bipolar 1 disorder, RACHEL, panic attacks and r/o trauma related symptoms and BPD symptoms.  He was last seen by me on 2/18/20 at which time lithium was started and effexor was decreased.  He reports today that he has tolerated starting lithium and feels like he is functioning better.  He is sleeping better, 7 hours per night, and does not feel the need to nap during the day.  He has been attending class regularly. Mood is \"slighltly elevated.\"  Denies other symptoms of jose raul. No recent panic attacks.  He did have a period of increased anxiety and was able to cope with this by using breathing methods he learned in therapy.     Medication SE: increased thirst; rare muscle twitches.  Denies increased urination, tremor, cognitive dulling, GI symptoms.    He is following up with PCP re: elevated blood pressure.  He reports white coat hypertension.      Current psychiatric medications  Lithium 300 mg BID  Effexor  112.5 mg daily  Lamictal 200 mg daily    Perphenazine-amitriptyline 2-10 mg tab (stopped last week)    Psychiatric Review of Symptoms:   Depression:  denies peristent depression or anhedonia, denies current SI, intent or plan.  Elevated:  none  Psychosis:  none  Anxiety:  excessive worry and social anxiety, panic attacks        Recent " "Substance Use:  None reported       Substance Use History     Past Use- Alcohol- yes  and Cannabis- yes      Psychiatric History     Suicide Attempt:   #- 3 by overdose on OTC medications; no medical attention sought.  Most Recent- 16 year old  SIB- cutting and scratching   Mary Beth- first episode- possibly as young as 12 years old characterized by hypomania.  Most recent manic episode in 10/2019.    Psychosis- AH, paranoia  Only during manic episodes.      Social History  FINANCIAL SUPPORT- UNKNOWN       CHILDREN- None       EARLY HISTORY/ EDUCATION- Currently student at [employer] studying [employer]    Family  hx - maternal grandfather and great uncle with bipolar disorder.  Maternal great uncle  by suicide        Psychiatric Medication Trials     From [facility] note from Eliane Sullivan:      - Propranolol by PCP for anxiety - not helpful     -2017, Prozac (dose unknown) mainly for anxiety. Pt reports little to no symptom reduction with Prozac. Does recall increased suicidality before and during Prozac (compared to now), but does not think Prozac worsened suicidality or irritability.       2017 established with psychiatrist, Ruben Fuentes in [place]:  During 2017 was started on:   - venlafaxine, possibly was up to 225mg daily, but decreased to 150mg after ~6 months d/t increased irritability and \"feeling numb, no emotion\".  Denies worsening SI, unsure if mood cycling worsened.   - lamotrigine, 200mg is highest dose. Started for \"mood cycling\", psychiatrist didn't diagnose him with bipolar.    - perphenazine-amitriptyline 2-10mg. Started possibly for sleep/anxiety.  Pt does endorse hallucinations during periods of depression and mary beth, denies hallucinations when euthymic.     9521-9364 amitriptyline (without perphenazine)(dose unknown) - discontinued d/t urinary retention    - metoprolol started for tachycardia     2018 - nortriptyline started by neuro for migraine ppx.                                  "                         Medical / Surgical History     Patient Active Problem List   Diagnosis     Major depressive disorder, recurrent episode, severe with anxious distress (H)     Generalized anxiety disorder     Social anxiety disorder     Migraine with aura, not intractable, without status migrainosus       Past Surgical History:   Procedure Laterality Date     HEAD & NECK SURGERY       TONSILLECTOMY           Medical Review of Systems                                                                                                    2, 10     A comprehensive review of systems was performed and is negative other than noted in the HPI.     Allergy   Patient has no known allergies.   Current Medications     Current Outpatient Medications   Medication Sig Dispense Refill     lamoTRIgine (LAMICTAL) 200 MG tablet Take 1 tablet (200 mg) by mouth daily 30 tablet 1     lithium (ESKALITH) 300 MG tablet Take 1 tablet (300 mg) by mouth 2 times daily 60 tablet 1     metoprolol succinate ER (TOPROL-XL) 50 MG 24 hr tablet Take 1 tablet (50 mg) by mouth daily 30 tablet 1     nortriptyline (PAMELOR) 25 MG capsule Take 1 capsule (25 mg) by mouth At Bedtime 30 capsule 1     perphenazine-amitriptyline 2-10 MG TABS per tablet Take 1 tablet by mouth At Bedtime 30 tablet 1     traZODone (DESYREL) 50 MG tablet Take 2 tablets (100 mg) by mouth At Bedtime 60 tablet 1     venlafaxine (EFFEXOR XR) 37.5 MG 24 hr capsule Take 3 capsules (112.5 mg) by mouth daily 90 capsule 1      Vitals                                                                                                                        3, 3     BP (!) 144/94   Pulse 101   Wt 110 kg (242 lb 6.4 oz)   BMI 33.81 kg/m         Mental Status Exam                                                                                   9, 14 cog gs     Alertness: alert  and oriented  Appearance: casually groomed  Behavior/Demeanor: cooperative, with good  eye contact   Speech: regular  rate and rhythm  Language: intact  Psychomotor: normal or unremarkable  Mood: description consistent with euthymia  Affect: euthymic; was congruent to mood; was congruent to content  Thought Process/Associations: unremarkable  Thought Content:  Reports none;  Denies suicidal ideation, violent ideation and delusions  Perception:  Reports none;  Denies auditory hallucinations and visual hallucinations  Insight: adequate  Judgment: adequate for safety  Cognition: (6) attention span: intact  concentration: intact  recent memory: intact  remote memory: intact  fund of knowledge: appropriate  Gait and Station: unremarkable     Labs and Data       PHQ9 Today:  7  PHQ-9 SCORE 12/9/2019   PHQ-9 Total Score 15         Recent Labs   Lab Test 02/18/20  1057   CR 0.72   GFRESTIMATED >90     No lab results found.    Diagnosis, Assessment & Plan                                                                            m2, h3     Provisional DSM-5 diagnoses:    -Bipolar Disorder I with psychotic features   -Generalized Anxiety Disorder with panic attacks, r/o panic disorder  -R/O trauma and stressor related disorder  -R/O cluster B personality traits    Assessment  Alexandru Mitchell is a 19-year-old male who was self-referred for ongoing in depression symptoms and episodes of elevated energy.  In 2018, [facility] for worsening depression and suicidal ideation.  He reports 3 past suicide attempts with ETOH and OTC medication, none of which came to medical attention.  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      Provisionally, his symptoms seem most consistent with bipolar disorder I with psychotic features and generalized anxiety disorder with panic attacks (vs panic disorder).  He reports traumatic loss of family member to suicide at age 11, which precipitated worsening of MH symptoms, and symptoms of a trauma/stressor related disorder should be  further assessed.  He has a genetic loading for bipolar disorder (maternal grandfather and great uncle).  Due to time constraints, we did not fully assess cluster B personality traits, however due to positive screen on Gonzalez Screening Instrument for BPD and his report of extreme emotional distress with rejection (including suicide attempts following rejection) borderline personality disorder should be further assessed.  He currently is engaged in individual therapy, with a focus on CBT, which he plans to continue, especially for treatment of anxiety.   Today, he reports that he is tolerating lithium and mood and anxiety are overall improved.  He is continuing individual therapy.  Will increase lithium today and continue to taper off of effexor.  He has discontinued perphenazine-amitryptyline and we will not restart this today. Denies SI/intent or plan and risk of harm to self or others is low.     Bipolar disorder  Increase lithium to 900 mg daily   Continue lamictal 200 mg daily   Decrease Effexor to 75 mg daily.  We will decrease by 37.5 mg q1-2 weeks as tolerated until discontinuation    Psychosis symptoms  Perphenazine-amitriptyline 2-10 mg tabs daily is now discontinued.  Will not restart today.     Insomnia  Limit trazodone use to as needed only    Anxiety  Metoprolol originally prescribed for tachycardia, will defer to PCP for management.    Recommended CBT for anxiety     Other psychiatric medications  Pamelor - use only as needed for headaches    Long term management of high risk medications  2/18/20: BMP, TSH WNL  Ordered today - lithium level     RTC: 2 week or sooner if needed     CRISIS NUMBERS:   Provided routinely in AVS.    Treatment Risk Statement:  The patient understands the risks, benefits, adverse effects and alternatives. Agrees to treatment with the capacity to do so. No medical contraindications to treatment. Agrees to call clinic for any problems. The patient understands to call 911 or go  to the nearest ED if life threatening or urgent symptoms occur.       PROVIDER:  ZO Jackman CNP

## 2020-02-25 NOTE — TELEPHONE ENCOUNTER
Nataliya Dominique APRN CNP Labossiere, Laura, RN   Caller: Unspecified (Today, 10:13 AM)             Yes, sorry about that.      Writer adjusted Rx to a full 30 d/s.

## 2020-02-25 NOTE — PATIENT INSTRUCTIONS
Increase lithium to 900 mg daily.  You can take this all at bedtime, or you can split the dose.   Decrease effexor to 75 mg daily (take two 37.5 mg tabs daily)    Please get your lithium level completed in 5-7 days.  This should be in the morning, 10-12 hours after your bedtime lithium dose and BEFORE your morning dose of lithium.     INSTRUCTIONS FOR USE OF LITHIUM      DO:    - call clinic if you, or another provider, makes any medication changes  - call clinic if your use of ibuprofen (or similar) increases at all  - call clinic if - call clinic if you experience any symptom listed below  - LABS:   obtain all labs as directed,  labs are done every 6 months on an ongoing basis;                 lithium level is drawn 5 days after starting lithium and after dose changes;                  labs also include kidney and thyroid function;                 all labs are drawn between 10 and 14 hours after your last dose (12hrs is ideal)      DO NOT:    - stop this med abruptly  - use street drugs or alcohol while being treated with lithium  - increase use of ibuprofen (or similar) without calling the clinic      TRY TO AVOID:    - any use of ibuprofen (or similar);  MAY use acetaminophen (Tylenol) for pain  - excessive sweating  - excessive salt intake  - use of over the counter herbal remedies      FOOD:  take with or without food but AVOID excessive salt intake      COMMON ADVERSE EFFECTS: acne, weight gain, nausea, tremor, increased urination, increased thirst, sedation, loose stools, blurring of memory and concentration      CALL CLINIC URGENTLY or EMERGENCY ROOM:  if you have any concerns, especially the following...  - blurred vision  - heart palpitations  - ear ringing  - excessive urination (especially at night)  OR  excessive thirst  - seizure  - kidney problems or any new medical problem  - thoughts of death or hurting yourself    - suspect Serotonin Syndrome:   confusion   tremor  severe headache  sweats  fever    funny feeling in muscles  need to pace / restlessness   severe nausea, vomiting  diarrhea

## 2020-02-25 NOTE — TELEPHONE ENCOUNTER
Pharmacy calling about quantity of the refill on Lithium. Sent over as 20 day script, pharmacy is curious if that was intentional or if we would rather have a 30 day script.    Pharmacy requesting call back to verify the amount, any pharmacist can assist there at [facility].

## 2020-02-26 NOTE — PROGRESS NOTES
"SEE Progress Note (For Clinics Outside of [facility])   Supported Employment & Education    NA[facility]IGATE Enrollee: Alexandru Mitchell (2000)     MRN: [id number]  Date:  2/25/20  Clinician: [facility] Supported Employment & , Mary Jefferson  Referring Provider: Patsy Dominique    1. Client Status Update:   Alexandru Mitchell is interested in education (Orientation to SEE services completed)    2. People present:   SEE/Writer  Person Supported: Alexandru Mitchell    3. Total number of persons who participated in contact: (do not count yourself/SEE) 1    4. Length of Actual Contact: 60 minutes   Traveled? Yes  Total Travel Time: 15 minutes    5. Location of contact:  School    6. Brief description of session, contact, or client status (include: strategies, interventions, client reaction to contact, next steps, etc)     and Alexandru met at [facility] for orientation to SEE services, to discuss his educational history, current courses, challenges and accommodations he feels would be helpful for him to meet his goals of obtaining Bs in his full time courses. He is taking challenging language courses and has missed many days due to appointments. Alexandru reports his teachers are very flexible and for the most part, understand his situation and are allowing make up / EC for missing class.   Alexandru has documentation on file with the [facility] and has been using his accommodations. He reports he has 2 friends that \"are keeping an eye on my mood and to check me if I get too 'high' (manic). Writer praised Alexandru for his use of natural supports.   Alexandru reports since starting a new medication, his focus has been much better, he has been sleeping and even caught up on 3 weeks of homework that he missed in his [place] course.   Writer offered additional meetings to discuss study techniques, though I did provide him with an academic study skills packet and example calendar for him to review.     7. Completion of mutually agreed upon client " task from previous meeting:  Not Applicable    8. Orientation and Treatment Planning:  SEE orientation meeting    9. Assessment:  Gathering SEE information/inventory regarding work and/or education history, skills, goals, and preferences with client    10. Placement:  Not Applicable    11. Follow Along Supports: (for clients who are working or attending school)   Not Applicable    12. Mutually agreed upon task for next meeting:     na    13. Next Meeting Scheduled for: asif SAMANO Supported Employment &

## 2020-03-02 NOTE — PROGRESS NOTES
SEE Progress Note (For Clinics Outside of [facility])   Supported Employment & Education    NAVIGATE Enrollee: lAexandru Mitchell (2000)     MRN: [id number]  Date:  2/18/20  Clinician: [facility] Supported Employment & , Mary Jefferson  Referring Provider: ZO Rodríguez    1. Client Status Update:   Alexandru Mitchell is interested in education (Client continuing a class or school program)    2. People present:   SEE/Writer  Person Supported: Alexandru Mitchell  NA[facility] Prescriber: Patsy Dominique, Other: Merlene Medina    3. Total number of persons who participated in contact: (do not count yourself/SEE) 3    4. Length of Actual Contact: 60 minutes   Traveled? Yes  Total Travel Time: 10 minutes    [facility]    6. Brief description of session, contact, or client status (include: strategies, interventions, client reaction to contact, next steps, etc)    Writer met with Alexandru during feedback session to offer supported employment and education services. Alexandru is interested in obtaining supports to use his accommodations and work on study skills. 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 Scheduled orientation to SEE services next week tue at 2pm at [facility].    7. Completion of mutually agreed upon client task from previous meeting:  Not Applicable    8. Orientation and Treatment Planning:  SEE orientation meeting    9. Assessment:  Gathering SEE information/inventory regarding work and/or education history, skills, goals, and preferences with client    10. Placement:  Not Applicable    11. Follow Along Supports: (for clients who are working or attending school)   Not Applicable    12. Mutually agreed upon task for next meeting:     na    13. Next Meeting Scheduled for: next week tue at 2    Mary SAMANO Supported Employment &

## 2020-03-03 DIAGNOSIS — Z79.899 HIGH RISK MEDICATIONS (NOT ANTICOAGULANTS) LONG-TERM USE: ICD-10-CM

## 2020-03-03 LAB — LITHIUM SERPL-SCNC: 0.28 MMOL/L (ref 0.6–1.2)

## 2020-03-03 PROCEDURE — 36415 COLL VENOUS BLD VENIPUNCTURE: CPT | Performed by: NURSE PRACTITIONER

## 2020-03-03 PROCEDURE — 80178 ASSAY OF LITHIUM: CPT | Performed by: NURSE PRACTITIONER

## 2020-03-04 ENCOUNTER — TELEPHONE (OUTPATIENT)
Dept: PSYCHIATRY | Facility: CLINIC | Age: 20
End: 2020-03-04

## 2020-03-04 DIAGNOSIS — F31.9 BIPOLAR 1 DISORDER (H): Primary | ICD-10-CM

## 2020-03-04 DIAGNOSIS — Z51.81 ENCOUNTER FOR THERAPEUTIC DRUG MONITORING: ICD-10-CM

## 2020-03-04 NOTE — TELEPHONE ENCOUNTER
Received return phone call from the pt. He has been taking lithium 300 mg TID vs all at once. Prior to the lab draw, he took 300 mg at approximately 12 am. He held off on taking another dose until after his lab draw at 1:24 pm (almost 14 hours later). Pt was outside of the 10-12 hour timeframe and TID dosing likely led to the low result. Suggested taking entire dose at bedtime, but agreed to discuss this with the provider as well and will then call the pt back.     [place] from 3[place]-3/13[place]20. He will return on 3/16. Pt had to reschedule future appt to 3/20. Agreed to call him back with dosing recommendations and will then order another lab. Asked that he complete another lab prior to 3/20 appt.

## 2020-03-04 NOTE — TELEPHONE ENCOUNTER
Nataliya Dominique APRN CNP Labossiere, Laura, RN   Caller: Unspecified (Today,  9:43 AM)             Yes, it would be best if he can take it all at night.  Can you ask him to do that?  Can you also check in on how mood has been, especially related to any symptoms of jose raul?     THanks,   Patsy        Writer called pt. No answer. LVM asking that he start taking Lithium 900 mg at bedtime. Asked that he complete the lab prior to his appt on 3/20. Provided education about importance of completing this 10-12 hours after last dose and suggested he take the medication around 9 pm the night before. Asked for a c/b if he has questions or concerns related to symptoms of jose raul or overall mood changes.

## 2020-03-04 NOTE — TELEPHONE ENCOUNTER
Nataliya Dominique APRN CNP Labossiere, Laura, SURJIT Albarran,   Can you follow up with Alexandru to confirm what dose of lithium he is taking, how long he has been on the current dose, missed doses and when he got this lab in relation to his last lithium dose?   Thanks!   Patsy        Writer reviewed lab results. This was completed at 1:24 pm. Pt likely outside of normal timeframe to complete trough level.     Pt scheduled for f/up on 3/10. Will see if provider wants him to repeat the lab draw prior to the appt at 9:30 am.    Called pt. No answer. LVM requesting a c/b to discuss results.

## 2020-03-05 DIAGNOSIS — F40.10 SOCIAL ANXIETY DISORDER: ICD-10-CM

## 2020-03-05 DIAGNOSIS — F33.2 MAJOR DEPRESSIVE DISORDER, RECURRENT EPISODE, SEVERE WITH ANXIOUS DISTRESS (H): ICD-10-CM

## 2020-03-05 DIAGNOSIS — F41.1 GENERALIZED ANXIETY DISORDER: ICD-10-CM

## 2020-03-06 DIAGNOSIS — F33.2 MAJOR DEPRESSIVE DISORDER, RECURRENT EPISODE, SEVERE WITH ANXIOUS DISTRESS (H): ICD-10-CM

## 2020-03-06 DIAGNOSIS — F41.1 GENERALIZED ANXIETY DISORDER: ICD-10-CM

## 2020-03-06 DIAGNOSIS — F40.10 SOCIAL ANXIETY DISORDER: ICD-10-CM

## 2020-03-06 RX ORDER — LAMOTRIGINE 200 MG/1
200 TABLET ORAL DAILY
Qty: 30 TABLET | Refills: 0 | Status: SHIPPED | OUTPATIENT
Start: 2020-03-06 | End: 2020-03-31

## 2020-03-06 NOTE — TELEPHONE ENCOUNTER
This refill has been sent to Patsy Dominique in Psychiatry who is managing his psych medications...

## 2020-03-06 NOTE — TELEPHONE ENCOUNTER
lamotrigine 200 mg tablet  Last Written Prescription Date:  12/11/2019  Last Fill Quantity: 30,   # refills: 1  Last Office Visit : 12/9/2019  Future Office visit:  5/6/2020    Routing refill request to provider for review/approval because:  Medication only filled for 60 Days.  Per Providers note,  Pt was suppose to follow up   With Psychiatry?     Refill med??     Follow up with Pt??  Refer to Provider for review    Mili Mota RN  Central Triage Red Flags/Med Refills          Office note 12/9/2019     Anxiety, Major depressive disorder, recurrent episode, moderate-severe (H). Possible bipolor I. Hx auditory hallucinations, not active.  Not suicidal.   See HPI for additional details.  I have already placed a referral to [facility] earlier this year.  I gave Alexandru the contact information for him to schedule an appointment.  In the meantime, I do feel his is quite vulnerable and his symptoms are not under control despite his multi-drug regimen.  He is agreeable to seeing Dr. Olivo for an interim consultation.  I will coordinate care with Psychiatry.  I also gave Alexandru Suicide and Crisis Connection phone numbers, as well as the address and phone # for the [facility].  -     BEHAVIORAL / SPIRITUAL HEALTH ([facility] ONLY); Future     Need for immunization against influenza  -     FLU VAC PRESRV FREE QUAD SPLIT VIR 3+YRS IM     F/U  Date will depend on when he can connect with psychiatry for ongoing management.     Total time spent 45 minutes.  More than 50% of the time spent with Mr. Mitchell on counseling / coordinating his care        Jerrica Billingsley M.D.  Internal Medicine  [facility]   pager [phone]

## 2020-03-06 NOTE — TELEPHONE ENCOUNTER
Medication requested: lamoTRIgine (LAMICTAL) 200 MG tablet  Last refilled: 1/23/2020  Qty: 30      Last seen: 2/25/2020  RTC: 2 weeks  Cancel: 1  No-show: 0  Next appt: 3/20/2020    Refill decision:   Refill pended and routed to the provider for review/determination due to   Cancel x 1

## 2020-03-09 RX ORDER — LAMOTRIGINE 200 MG/1
200 TABLET ORAL DAILY
Qty: 30 TABLET | COMMUNITY
Start: 2020-03-09

## 2020-03-09 NOTE — TELEPHONE ENCOUNTER
From: Nataliya Dominique APRN CNP   Sent: 3/6/2020   4:42 PM CDT   To: Avis Aparicio RN     Yes, ok to refill the lamictal!   ----- Message -----   From: Avis Aparicio RN   Sent: 3/6/2020   4:16 PM CST   To: ZO La CNP,     As you know, we rescheduled his appt from 3/10 to 3/20. You'll continue to prescribe Lamictal correct?     -Avis           03/06/20 1642  Nataliya Ch APRN CNP   E-Prescribing Status: Receipt confirmed by pharmacy (3/6/2020  4:44 PM CST)

## 2020-03-11 ENCOUNTER — HEALTH MAINTENANCE LETTER (OUTPATIENT)
Age: 20
End: 2020-03-11

## 2020-03-20 ENCOUNTER — VIRTUAL VISIT (OUTPATIENT)
Dept: PSYCHIATRY | Facility: CLINIC | Age: 20
End: 2020-03-20
Attending: NURSE PRACTITIONER
Payer: COMMERCIAL

## 2020-03-20 DIAGNOSIS — F31.9 BIPOLAR I DISORDER (H): Primary | ICD-10-CM
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
## 2020-03-31 ENCOUNTER — VIRTUAL VISIT (OUTPATIENT)
Dept: PSYCHIATRY | Facility: CLINIC | Age: 20
End: 2020-03-31
Attending: NURSE PRACTITIONER
Payer: COMMERCIAL

## 2020-03-31 DIAGNOSIS — F41.1 GENERALIZED ANXIETY DISORDER: ICD-10-CM

## 2020-03-31 DIAGNOSIS — F33.2 MAJOR DEPRESSIVE DISORDER, RECURRENT EPISODE, SEVERE WITH ANXIOUS DISTRESS (H): ICD-10-CM

## 2020-03-31 DIAGNOSIS — F31.9 BIPOLAR AFFECTIVE DISORDER, REMISSION STATUS UNSPECIFIED (H): ICD-10-CM

## 2020-03-31 DIAGNOSIS — F40.10 SOCIAL ANXIETY DISORDER: ICD-10-CM

## 2020-03-31 RX ORDER — LAMOTRIGINE 200 MG/1
200 TABLET ORAL DAILY
Qty: 30 TABLET | Refills: 2 | Status: SHIPPED | OUTPATIENT
Start: 2020-03-31 | End: 2020-09-03

## 2020-03-31 RX ORDER — LITHIUM CARBONATE 300 MG
900 TABLET ORAL DAILY
Qty: 90 TABLET | Refills: 2 | Status: SHIPPED | OUTPATIENT
Start: 2020-03-31 | End: 2020-07-24

## 2020-03-31 RX ORDER — TRAZODONE HYDROCHLORIDE 50 MG/1
100 TABLET, FILM COATED ORAL AT BEDTIME
Qty: 60 TABLET | Refills: 2 | Status: SHIPPED | OUTPATIENT
Start: 2020-03-31 | End: 2020-09-03

## 2020-03-31 NOTE — PROGRESS NOTES
"  Psychiatry Clinic Telephone Encounter (telehealth)                                    Alexandru Mitchell is a 20 year old male who prefers the name Alexandru and pronoun he, him, his.  Therapist: None   PCP: Jerrica Billingsley  Other Providers: None  Referred by self for evaluation of depression and possible bipolar disorder.     History was provided by patient who was a good historian.     Chief Complaint                                                                                                        \" anxiety \"      History of Present Illness                                                                                4, 4     This visit was conducted via telephone due to reduced clinic operations during COVID-19.      Alexandru is a 20-year-old male with recently diagnosed bipolar 1 disorder, RACHEL, panic attacks and r/o trauma related symptoms and BPD symptoms.  He was last seen by me on 3/20/20 at which time effexor dose was decreased.  He reports today that he has had increased stress due to two family members (one health care worker) recently exposed to COVID-19.  In addition he has ongoing stress related to his recent DWI and legal charges, and trying to keep up with his online courses.  Has not been able to see his therapist due to issues with online consent form for telehealth.  Overall he feels that he is coping well with these stressors, with \"better than expected\" levels of anxiety.  He has been utlizing breathing exercises and physical activity for primary coping mechanisms.  He has continued to have anxiety attacks, approx once every other day, lasting 15-30 minutes.      He reports good medication compliance for the past 2 weeks.  Mood has remained overall stable, and he denies persistent depression, anhedonia, or jose raul.  He is sleeping approx 6 hours per night due to staying up to work on an essay.  Nightmares are improving and are less intense.  He reports that death ideation is also decreasing, has " occurred 3x in the past 10 days.  Denies current SI, intent or plan.     Has not used any alcohol or any other substances x 2+ weeks.        Medication SE: loose stools have resolved.  Reports some cognitive dulling and decreased attention span.  Denies increased thirst, incresaed urination, tremor, nausea or vomiting.  Denies changes in balance.  Denies chest pain, racing heart or SOB outside of acute anxiety.  No rashes or skin changes.      Current psychiatric medications  Lithium 900 mg at bedtime  Effexor 37.5 mg daily  Lamictal 200 mg daily    Metroprolol 50 mg daily     Psychiatric Review of Symptoms:   Depression:  see HPI  Elevated:  none currently   Psychosis:  none  Anxiety:  excessive worry and social anxiety, panic attacks        Recent Substance Use:  None reported        Psychiatric History (no change today)     Suicide Attempt:   #- 3 by overdose on OTC medications; no medical attention sought.  Most Recent- 16 year old  SIB- cutting and scratching   Mary Beth- first episode- possibly as young as 12 years old characterized by hypomania.  Most recent manic episode in 10/2019.    Psychosis- AH, paranoia  Only during manic episodes.      Social History  FINANCIAL SUPPORT- UNKNOWN       CHILDREN- None       EARLY HISTORY/ EDUCATION- Currently student at [employer] studying [employer]  hx - maternal grandfather and great uncle with bipolar disorder.  Maternal great uncle  by suicide        Psychiatric Medication Trials (no change today)     From [facility] note from Eliane Sullivan:      - Propranolol by PCP for anxiety - not helpful     /[id number], Prozac (dose unknown) mainly for anxiety. Pt reports little to no symptom reduction with Prozac. Does recall increased suicidality before and during Prozac (compared to now), but does not think Prozac worsened suicidality or irritability.       2017 established with psychiatrist, Ruben Fuentes in [place]:  During 2017 was started on:   - venlafaxine,  "possibly was up to 225mg daily, but decreased to 150mg after ~6 months d/t increased irritability and \"feeling numb, no emotion\".  Denies worsening SI, unsure if mood cycling worsened.   - lamotrigine, 200mg is highest dose. Started for \"mood cycling\", psychiatrist didn't diagnose him with bipolar.    - perphenazine-amitriptyline 2-10mg. Started possibly for sleep/anxiety.  Pt does endorse hallucinations during periods of depression and jose raul, denies hallucinations when euthymic.     [id number] amitriptyline (without perphenazine)(dose unknown) - discontinued d/t urinary retention    2018- metoprolol started for tachycardia     2018 - nortriptyline started by neuro for migraine ppx.                                                          Medical / Surgical History     Patient Active Problem List   Diagnosis     Generalized anxiety disorder     Social anxiety disorder     Migraine with aura, not intractable, without status migrainosus       Past Surgical History:   Procedure Laterality Date     HEAD & NECK SURGERY       TONSILLECTOMY           Medical Review of Systems                                                                                                    2, 10     A comprehensive review of systems, by telephone, was performed and is negative other than noted in the HPI.     Allergy   Patient has no known allergies.   Current Medications     Current Outpatient Medications   Medication Sig Dispense Refill     lamoTRIgine (LAMICTAL) 200 MG tablet Take 1 tablet (200 mg) by mouth daily 30 tablet 0     lithium (ESKALITH) 300 MG tablet Take 3 tablets (900 mg) by mouth daily 90 tablet 1     metoprolol succinate ER (TOPROL-XL) 50 MG 24 hr tablet Take 1 tablet (50 mg) by mouth daily 30 tablet 1     nortriptyline (PAMELOR) 25 MG capsule Take 1 capsule (25 mg) by mouth At Bedtime 30 capsule 1     perphenazine-amitriptyline 2-10 MG TABS per tablet Take 1 tablet by mouth At Bedtime 30 tablet 1     traZODone (DESYREL) " 50 MG tablet Take 2 tablets (100 mg) by mouth At Bedtime 60 tablet 1     venlafaxine (EFFEXOR XR) 37.5 MG 24 hr capsule Take 3 capsules (112.5 mg) by mouth daily 90 capsule 1      Vitals                                                                                                                        3, 3     There were no vitals taken for this visit. - telephone visit        Mental Status Exam                                                                                   9, 14 cog gs     Alertness: alert  and oriented  Appearance: not assessed, telephone encounter  Behavior/Demeanor: not assessed, telephone encounter  Speech: regular rate and rhythm  Language: intact  Psychomotor: normal or unremarkable  Mood: anxious   Affect: sounded euthymic via telephone  Thought Process/Associations: unremarkable  Thought Content:  Reports none;  Denies suicidal ideation, violent ideation and delusions  Perception:  Reports none;  Denies auditory hallucinations and visual hallucinations  Insight: adequate  Judgment: adequate for safety  Cognition: (6) attention span: intact  concentration: intact  recent memory: intact  remote memory: intact  fund of knowledge: appropriate  Gait and Station: unremarkable     Labs and Data       PHQ9 Today: not completed   PHQ-9 SCORE 12/9/2019   PHQ-9 Total Score 15         Recent Labs   Lab Test 02/18/20  1057   CR 0.72   GFRESTIMATED >90     No lab results found.    Diagnosis, Assessment & Plan                                                                            m2, h3     Provisional DSM-5 diagnoses:    -Bipolar Disorder I with psychotic features   -Generalized Anxiety Disorder with panic attacks, r/o panic disorder  -R/O trauma and stressor related disorder  -R/O cluster B personality traits    Assessment  Alexandru Mitchell is a 20-year-old male who was self-referred for ongoing in depression symptoms and episodes of elevated energy.  In 2018, he admitted himself to the [facility]  Emergency Department for worsening depression and suicidal ideation.  He reports 3 past suicide attempts with ETOH and OTC medication, none of which came to medical attention.  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      Provisionally, his symptoms seem most consistent with bipolar disorder I with psychotic features and generalized anxiety disorder with panic attacks (vs panic disorder).  He reports traumatic loss of family member to suicide at age 11, which precipitated worsening of MH symptoms, and symptoms of a trauma/stressor related disorder should be further assessed.  He has a genetic loading for bipolar disorder (maternal grandfather and great uncle).  Due to time constraints, we did not fully assess cluster B personality traits, however due to positive screen on Gonzalez Screening Instrument for BPD and his report of extreme emotional distress with rejection (including suicide attempts following rejection) borderline personality disorder should be further assessed.  He currently is engaged in individual therapy, with a focus on CBT, which he plans to continue, especially for treatment of anxiety.     Today, Alexandru reports improvements in mood stability with consistent use of lithium, and ongoing but improving anxiety.  Anxiety is occurring in the context of a number of stressors, including family members diagnosed with COVID-19, need to return to parent's home due to campus closures, and a recent DWI. Will continue to taper off of effexor today.  He hopes to be initiating phone visits with his therapist soon, and he will contact the clinic if symptoms of anxiety or depression worsen.  We will hold off on increases of lithium for now due to inability to obtain lab and symptom improvement on current dose.  He denies SI, intent or plan and risk of harm to self or others is low.      Bipolar disorder  Continue lithium 900 mg daily   Continue lamictal 200 mg daily  "  Continue Effexor 37.5 mg daily x 4 days (total duration of 2 weeks at this dose), and then stop    Psychosis symptoms  Perphenazine-amitriptyline 2-10 mg tabs daily is now discontinued.  Will not restart today.     Insomnia  Limit trazodone use to as needed only    Anxiety  Metoprolol originally prescribed for tachycardia, will defer to PCP for management.    Recommended CBT for anxiety     Other psychiatric medications  Pamelor - use only as needed for headaches    Long term management of high risk medications  2/18/20: BMP, TSH WNL  Recently ordered: lithium level     RTC: 4 weeks or sooner if needed     CRISIS NUMBERS:   Provided routinely in AVS.      Treatment Risk Statement:  The patient understands the risks, benefits, adverse effects and alternatives. Agrees to treatment with the capacity to do so. No medical contraindications to treatment. Agrees to call clinic for any problems. The patient understands to call 911 or go to the nearest ED if life threatening or urgent symptoms occur.     Alexandru Mitchell is a 20 year old male who is being evaluated via a billable telephone visit.      The patient has been notified of following:     \"This telephone visit will be conducted via a call between you and your physician/provider. We have found that certain health care needs can be provided without the need for a physical exam.  This service lets us provide the care you need with a short phone conversation.  If a prescription is necessary we can send it directly to your pharmacy.  If lab work is needed we can place an order for that and you can then stop by our lab to have the test done at a later time.    If during the course of the call the physician/provider feels a telephone visit is not appropriate, you will not be charged for this service.\"     Start time: 9:09 AM  End time: 9:34 AM (25 minute duration of visit)    Patient has given verbal consent for Telephone visit?  Yes    Nataliya Dominique, CNP, 3/31/2020 " 9:41 AM'

## 2020-04-29 DIAGNOSIS — F41.1 GENERALIZED ANXIETY DISORDER: ICD-10-CM

## 2020-04-29 DIAGNOSIS — I10 ESSENTIAL HYPERTENSION: ICD-10-CM

## 2020-04-29 DIAGNOSIS — F40.10 SOCIAL ANXIETY DISORDER: ICD-10-CM

## 2020-04-29 DIAGNOSIS — F33.2 MAJOR DEPRESSIVE DISORDER, RECURRENT EPISODE, SEVERE WITH ANXIOUS DISTRESS (H): ICD-10-CM

## 2020-04-29 RX ORDER — METOPROLOL SUCCINATE 50 MG/1
50 TABLET, EXTENDED RELEASE ORAL DAILY
Qty: 30 TABLET | Refills: 0 | Status: SHIPPED | OUTPATIENT
Start: 2020-04-29 | End: 2020-05-05

## 2020-04-29 RX ORDER — LAMOTRIGINE 200 MG/1
TABLET ORAL
Qty: 30 TABLET | Refills: 2 | OUTPATIENT
Start: 2020-04-29

## 2020-04-29 NOTE — TELEPHONE ENCOUNTER
metoprolol succinate ER (TOPROL-XL) 50 MG 24 hr tablet    Last Written Prescription Date:  12/11/2019  Last Fill Quantity: 30,   # refills: 1  Last Office Visit : 12/9/2019  Future Office visit:  5/6/2020    Routing refill request to provider for review/approval because:  Medication only filled for 60 days in Dec 2019?      Refill the med?? Check up with Pt??    Refer to clinic for review    Mili Mota RN  Central Triage Red Flags/Med Refills

## 2020-05-04 DIAGNOSIS — I10 ESSENTIAL HYPERTENSION: ICD-10-CM

## 2020-05-05 RX ORDER — METOPROLOL SUCCINATE 50 MG/1
50 TABLET, EXTENDED RELEASE ORAL DAILY
Qty: 30 TABLET | Refills: 3 | Status: SHIPPED | OUTPATIENT
Start: 2020-05-05 | End: 2020-10-07

## 2020-05-05 NOTE — TELEPHONE ENCOUNTER
metoprolol succinate ER (TOPROL-XL) 50 MG 24 hr tablet     Last Written Prescription Date:  4/29/20  Last Fill Quantity: 30,   # refills: 0  Last Office Visit : 12/9/19  Future Office visit:  5/6/20    Routing refill request to provider for review/approval because:  BP > 140/90  12/09/19 (!) 137/90     Not on protocol for associated diagnosis.  Referred to clinic for review.

## 2020-06-18 DIAGNOSIS — F31.9 BIPOLAR AFFECTIVE DISORDER, REMISSION STATUS UNSPECIFIED (H): Primary | ICD-10-CM

## 2020-06-18 RX ORDER — LITHIUM CARBONATE 300 MG/1
900 CAPSULE ORAL DAILY
Qty: 90 CAPSULE | Refills: 0 | Status: SHIPPED | OUTPATIENT
Start: 2020-06-18 | End: 2020-07-24

## 2020-06-18 RX ORDER — LITHIUM CARBONATE 300 MG/1
CAPSULE ORAL
Qty: 90 CAPSULE | OUTPATIENT
Start: 2020-06-18

## 2020-06-18 NOTE — TELEPHONE ENCOUNTER
Last seen: 3/31/20  RTC: 4 weeks  Cancel: none  No-show: none  Next appt: none     Incoming refill from pharmacy via electronic request     Medication requested: lithium 300 MG capsule   Directions: Take 3 capsules (900 mg) by mouth daily   Qty: 90  Last refilled: approximately 5/31/20     Medication refill approved per refill protocol    30 d/s sent to preferred pharmacy with request to schedule f/up for further refills

## 2020-07-23 DIAGNOSIS — F31.9 BIPOLAR AFFECTIVE DISORDER, REMISSION STATUS UNSPECIFIED (H): ICD-10-CM

## 2020-07-23 NOTE — TELEPHONE ENCOUNTER
[facility] Call Center    Phone Message    May a detailed message be left on voicemail: yes     Reason for Call: Medication Refill Request    Has the patient contacted the pharmacy for the refill? Yes   Name of medication being requested: lithium  Provider who prescribed the medication: ZO Faustin CNP  Pharmacy:  [id number] [place], [place] - [address]  Date medication is needed: 7/25/20    Patient is going out of town with family in AM on 7/25/20 so if medication cannot be sent by then, he will choose a different pharmacy.      Action Taken: Message routed to:  Other: Nursing pool    Travel Screening: Not Applicable

## 2020-07-24 RX ORDER — LITHIUM CARBONATE 300 MG/1
900 CAPSULE ORAL DAILY
Qty: 90 CAPSULE | Refills: 0 | Status: SHIPPED | OUTPATIENT
Start: 2020-07-24 | End: 2020-09-03

## 2020-07-24 NOTE — TELEPHONE ENCOUNTER
Writer called pt and LVM notifying him of the refill. Also asked that he reschedule 8/25 appt, as he will still be out of state. Phone/video visits can only be completed for pt's while in the state of  [place]. Asked that he reschedule appt for September.

## 2020-07-24 NOTE — TELEPHONE ENCOUNTER
I'm glad to hear this - OK to refill the lithium, and please ask him to make an appointment with me as soon as possible Sept after he returns.      Thanks,  Nataliya Dominique, CNP, 7/24/2020 2:19 PM

## 2020-07-24 NOTE — TELEPHONE ENCOUNTER
Writer received return phone call from the pt. He is currently living in [place] and will be returning to [place] on 8/31. The pt reports taking Lithium consistently and may rarely miss doses. He has stopped his other medications, besides metoprolol. The pt denies any depressive or jose raul symptoms and has noticed since taking Lithium, his mood seems more stable and he's able to better handle stressors. He denies any concerns at this time.    Message relayed to provider to confirm if refill is appropriate.

## 2020-07-24 NOTE — TELEPHONE ENCOUNTER
Nataliya Dominique APRN CNP Labossiere, Laura, RN    Caller: Unspecified (Yesterday,  3:00 PM)               Can you follow up with him first? I think he would have run out of meds by now but I'm not sure what I refilled last time.  Can you see how is doing in terms of depression/jose raul symptoms?  Also, it will be good to know if he is still in [place], and if he is planning to return to [place].     Thanks,   Patsy        Writer called pt. No answer. LVM requesting a c/b to check in before refilling Lithium

## 2020-07-24 NOTE — TELEPHONE ENCOUNTER
Last seen: 3/31/20  RTC: 4 weeks  Cancel: 4/28/20  No-show: 5/1/20  Next appt: 8/25/20     Medication requested: lithium 300 MG capsule   Directions: Take 3 capsules (900 mg) by mouth daily  Qty: 90  Last refilled: approximately 6/18/20 per med tab      Request forwarded to provider due to cancellation and no show

## 2020-09-03 ENCOUNTER — VIRTUAL VISIT (OUTPATIENT)
Dept: PSYCHIATRY | Facility: CLINIC | Age: 20
End: 2020-09-03
Attending: NURSE PRACTITIONER
Payer: COMMERCIAL

## 2020-09-03 DIAGNOSIS — F40.10 SOCIAL ANXIETY DISORDER: ICD-10-CM

## 2020-09-03 DIAGNOSIS — F33.2 MAJOR DEPRESSIVE DISORDER, RECURRENT EPISODE, SEVERE WITH ANXIOUS DISTRESS (H): ICD-10-CM

## 2020-09-03 DIAGNOSIS — F31.9 BIPOLAR AFFECTIVE DISORDER, REMISSION STATUS UNSPECIFIED (H): ICD-10-CM

## 2020-09-03 DIAGNOSIS — F41.1 GENERALIZED ANXIETY DISORDER: ICD-10-CM

## 2020-09-03 DIAGNOSIS — I10 ESSENTIAL HYPERTENSION: ICD-10-CM

## 2020-09-03 DIAGNOSIS — Z79.899 HIGH RISK MEDICATION USE: Primary | ICD-10-CM

## 2020-09-03 RX ORDER — LITHIUM CARBONATE 300 MG/1
900 CAPSULE ORAL DAILY
Qty: 90 CAPSULE | Refills: 2 | Status: SHIPPED | OUTPATIENT
Start: 2020-09-03 | End: 2020-10-01

## 2020-09-03 NOTE — PROGRESS NOTES
"TELEPHONE VISIT  Alexandru Mitchell is a 20 year old pt. who is being evaluated via a billable telephone visit.      The patient has been notified of the following:    We have found that certain health care needs can be provided without the need for a physical exam. This service lets us provide the care you need with a short phone conversation. If a prescription is necessary we can send it directly to your pharmacy. If lab work is needed we can place an order for that and you can then stop by our lab to have the test done at a later time. Insurers are generally covering virtual visits as they would in-office visits so billing should not be different than normal.  If for some reason you do get billed incorrectly, you should contact the billing office to correct it and that number is in the AVS .    Patient has given verbal consent for a telephone visit?:  Yes   How would the pt like to obtain the AVS?:  [employer] Smart[employer]rase [PsychAVS] has been placed in 'Patient Instructions':  Yes   Patient is located in [place].     Start Time:  8:02 AM          End Time:  8:36 AM      Psychiatry Clinic Telephone Encounter (telehealth)                                    Alexandru Mitchell is a 20 year old male who prefers the name Alexandru and pronoun he, him, his.  Therapist: None   PCP: Jerrica Billingsley  Other Providers: None  Referred by self for evaluation of depression and possible bipolar disorder.     History was provided by patient who was a good historian.     Chief Complaint                                                                                                        \" stable overall \"      History of Present Illness                                                                                4, 4     -Alexandru is a 20-year-old male with recently diagnosed bipolar 1 disorder, RACHEL, panic attacks and r/o trauma related symptoms and BPD symptoms.  He was last seen by me on 3/31/20 at which time effexor was discontinued.  He reports " "that he moved back to campus from [place] on Saturday.  He will be taking 14 credits this semester.  He is somewhat anxious about his courses starting online.  Other recent stressors include a family member who contracted COVID, however he did not contract.    -He reports that overall his mood has been \"stable\" and he denies episodes of hypomania or depression.  Last episode of depression was in April following his DWI and legal charges. He was having an increase in SI during this time but no plan.  Has been feeling better since May.  Denies current SI, intent or plan.   -He reports anxiety has been minimal recently, is coping well when he does experience anxiety.    -Has not used any alcohol or any other substances recently.  -Has been using Nicorette lozenges.  -He reports he is only taking lithium, and stopped taking lamictal sometime around April.        Medication SE/ROS: Denies medication SE.  Reports some cognitive dulling and decreased attention span.  Denies increased thirst, incresaed urination, tremor, nausea or vomiting.  Denies changes in balance.  Denies chest pain, racing heart or SOB.  No rashes or skin changes.      Current psychiatric medications  Lithium 900 mg at bedtime  Lamictal 200 mg daily (not taking x several months)    Metroprolol 50 mg daily     Psychiatric Review of Symptoms:   Depression:  see HPI  Elevated:  none currently   Psychosis:  none  Anxiety:  excessive worry and social anxiety        Recent Substance Use:  None reported        Psychiatric History (no change today)     Suicide Attempt:   #- 3 by overdose on OTC medications; no medical attention sought.  Most Recent- 16 year old  SIB- cutting and scratching   Mary Beth- first episode- possibly as young as 12 years old characterized by hypomania.  Most recent manic episode in 10/2019.    Psychosis- AH, paranoia  Only during manic episodes.      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  Maternal great uncle  by suicide        Psychiatric Medication Trials (no change today)     From [facility] note from Eliane Sullivan:      - Propranolol by PCP for anxiety - not helpful     /[id number], Prozac (dose unknown) mainly for anxiety. Pt reports little to no symptom reduction with Prozac. Does recall increased suicidality before and during Prozac (compared to now), but does not think Prozac worsened suicidality or irritability.       2017 established with psychiatrist, Ruben Fuentes in [place]:  During 2017 was started on:   - venlafaxine, possibly was up to 225mg daily, but decreased to 150mg after ~6 months d/t increased irritability and \"feeling numb, no emotion\".  Denies worsening SI, unsure if mood cycling worsened.   - lamotrigine, 200mg is highest dose. Started for \"mood cycling\", psychiatrist didn't diagnose him with bipolar.    - perphenazine-amitriptyline 2-10mg. Started possibly for sleep/anxiety.  Pt does endorse hallucinations during periods of depression and jose raul, denies hallucinations when euthymic.     8194-0504 amitriptyline (without perphenazine)(dose unknown) - discontinued d/t urinary retention    - metoprolol started for tachycardia      - nortriptyline started by neuro for migraine ppx.                                                          Medical / Surgical History     Patient Active Problem List   Diagnosis     Generalized anxiety disorder     Social anxiety disorder     Migraine with aura, not intractable, without status migrainosus       Past Surgical History:   Procedure Laterality Date     HEAD & NECK SURGERY       TONSILLECTOMY           Medical Review of Systems                                                                                                    2, 10     A comprehensive review of systems, by telephone, was " "performed and is negative other than noted in the HPI.     Allergy   Patient has no known allergies.   Current Medications     Current Outpatient Medications   Medication Sig Dispense Refill     lamoTRIgine (LAMICTAL) 200 MG tablet Take 1 tablet (200 mg) by mouth daily 30 tablet 2     lithium 300 MG capsule Take 3 capsules (900 mg) by mouth daily 90 capsule 0     metoprolol succinate ER (TOPROL-XL) 50 MG 24 hr tablet Take 1 tablet (50 mg) by mouth daily 30 tablet 3     nortriptyline (PAMELOR) 25 MG capsule Take 1 capsule (25 mg) by mouth At Bedtime 30 capsule 1     perphenazine-amitriptyline 2-10 MG TABS per tablet Take 1 tablet by mouth At Bedtime 30 tablet 1     traZODone (DESYREL) 50 MG tablet Take 2 tablets (100 mg) by mouth At Bedtime 60 tablet 2     venlafaxine (EFFEXOR XR) 37.5 MG 24 hr capsule Take 3 capsules (112.5 mg) by mouth daily 90 capsule 1      Vitals                                                                                                                        3, 3     There were no vitals taken for this visit. - telephone visit        Mental Status Exam                                                                                   9, 14 cog gs     Alertness: alert  and oriented  Appearance: not assessed, telephone encounter  Behavior/Demeanor: cooperative, pleasant  Speech: regular rate and rhythm  Language: intact  Psychomotor: not assessed  Mood: \"stable\"   Affect: seemed euthymic via telephone  Thought Process/Associations: unremarkable  Thought Content:  Reports none;  Denies suicidal ideation, violent ideation and delusions  Perception:  Reports none;  Denies auditory hallucinations and visual hallucinations  Insight: adequate  Judgment: adequate for safety  Cognition: (6) attention span: intact  concentration: intact  recent memory: intact  remote memory: intact  fund of knowledge: appropriate  Gait and Station: unremarkable     Labs and Data       PHQ9 Today: not completed   PHQ-9 " SCORE 12/9/2019   PHQ-9 Total Score 15         Recent Labs   Lab Test 02/18/20  1057   CR 0.72   GFRESTIMATED >90     No lab results found.    Diagnosis, Assessment & Plan                                                                            m2, h3     Provisional DSM-5 diagnoses:    -Bipolar Disorder I with psychotic features   -Generalized Anxiety Disorder with panic attacks, r/o panic disorder  -R/O trauma and stressor related disorder  -R/O cluster B personality traits    Assessment  Alexandru Mitchell is a 20-year-old male who was self-referred for ongoing in depression symptoms and episodes of elevated energy.  In 2018, he admitted himself to the [facility] for worsening depression and suicidal ideation.  He reports 3 past suicide attempts with ETOH and OTC medication, none of which came to medical attention.  He is a [employer][note 3]  student studying [employer].      Provisionally, his symptoms seem most consistent with bipolar disorder I with psychotic features and generalized anxiety disorder with panic attacks (vs panic disorder).  He reports traumatic loss of family member to suicide at age 11, which precipitated worsening of MH symptoms, and symptoms of a trauma/stressor related disorder should be further assessed.  He has a genetic loading for bipolar disorder (maternal grandfather and great uncle).  Due to time constraints, we did not fully assess cluster B personality traits, however due to positive screen on Gonzalez Screening Instrument for BPD and his report of extreme emotional distress with rejection (including suicide attempts following rejection) borderline personality disorder should be further assessed.      Today, Alexandru reports improvements in mood stability with consistent use of lithium.  Since his last visit he stopped taking lamictal, will not restart this today, however he may benefit from this medication again in the future for depression/anxiety and  he will consider restarting.  He agrees to complete lithium labs this week. He denies SI, intent or plan and risk of harm to self or others is low.      Bipolar disorder  Continue lithium 900 mg daily   lamictal 200 mg daily - no longer taking- will not restart today    Psychosis symptoms  Resolved with jose raul, no current antipsychotic medication required    Insomnia  No longer using trazodone, will disconitnue    Anxiety  Metoprolol originally prescribed for tachycardia, will defer to PCP for management.    Recommended CBT for anxiety     Other psychiatric medications  Pamelor - use only as needed for headaches, defer to PCP for management     Long term management of high risk medications  2/18/20: BMP, TSH WNL  Ordered today: lithium level, BMP, TSH    RTC: 4 weeks or sooner if needed     CRISIS NUMBERS:   Provided routinely in AVS.      Treatment Risk Statement:  The patient understands the risks, benefits, adverse effects and alternatives. Agrees to treatment with the capacity to do so. No medical contraindications to treatment. Agrees to call clinic for any problems. The patient understands to call 911 or go to the nearest ED if life threatening or urgent symptoms occur.

## 2020-09-03 NOTE — PATIENT INSTRUCTIONS
Treatment Plan Today:   Please get your lithium labs done within the next week - this should be 10-12 hours after your bedtime lithium dose.     If you decide you would like to restart lamictal, please contact me via [employer] or call the clinic and we can discuss the dosing of this.  Do not restart the medication without contacting us first.       INSTRUCTIONS FOR USE OF LITHIUM      DO:    - call clinic if you, or another provider, makes any medication changes  - call clinic if your use of ibuprofen (or similar) increases at all  - call clinic if - call clinic if you experience any symptom listed below  - LABS:   obtain all labs as directed,  labs are done every 6 months on an ongoing basis;                 lithium level is drawn 5 days after starting lithium and after dose changes;                  labs also include kidney and thyroid function;                 all labs are drawn between 10 and 14 hours after your last dose (12hrs is ideal)      DO NOT:    - stop this med abruptly  - use street drugs or alcohol while being treated with lithium  - increase use of ibuprofen (or similar) without calling the clinic      TRY TO AVOID:    - any use of ibuprofen (or similar);  MAY use acetaminophen (Tylenol) for pain  - excessive sweating  - excessive salt intake  - use of over the counter herbal remedies      FOOD:  take with or without food but AVOID excessive salt intake      COMMON ADVERSE EFFECTS: acne, weight gain, nausea, tremor, increased urination, increased thirst, sedation, loose stools, blurring of memory and concentration      CALL CLINIC URGENTLY or EMERGENCY ROOM:  if you have any concerns, especially the following...  - blurred vision  - heart palpitations  - ear ringing  - excessive urination (especially at night)  OR  excessive thirst  - seizure  - kidney problems or any new medical problem  - thoughts of death or hurting  yourself      ------------------------------------------------------------------------    Thank you for coming to the PSYCHIATRY CLINIC.    Lab Testing:  If you had lab testing today and your results are reassuring or normal they will be mailed to you or sent through [employer] within 7 days. If the lab tests need quick action we will call you with the results. The phone number we will call with results is # [phone] (home) . If this is not the best number please call our clinic and change the number.    Medication Refills:  If you need any refills please call your pharmacy and they will contact us. Our fax number for refills is [phone]. Please allow three business for refill processing. If you need to  your refill at a new pharmacy, please contact the new pharmacy directly. The new pharmacy will help you get your medications transferred.     Scheduling:  If you have any concerns about today's visit or wish to schedule another appointment please call our office during normal business hours [phone] (8-5:00 M-F)    Contact Us:  Please call [phone] during business hours (8-5:00 M-F).  If after clinic hours, or on the weekend, please call  [phone].    Financial Assistance [phone]  [facility] Billing [phone]  Central Billing Office, [facility]: [phone]  [place] Billing [phone]  Medical Records [phone]      MENTAL HEALTH CRISIS NUMBERS:  For a medical emergency please call  911 or go to the nearest ER.     [facility]:   [facility] -[phone]   Crisis Residence [facility] -[phone]   Walk-In Counseling Center [facility] -[phone]   COPE 24/7 [place] Mobile Team -[phone] (adults)/[phone] (child)  CHILD: [facility] needs assessment team - [phone]      [facility]:   [facility] - [phone]   Walk-in counseling [facility] - [phone]   Walk-in counseling [facility] -  [phone]   Crisis Residence [facility] - [phone]  Urgent Care Adult Mental Kjqpib-[phone] mobile unit/ 24/7 crisis line    National Crisis Numbers:   National Suicide Prevention Lifeline: 1-544-086-TALK (447-937-5565)  Poison Control Center - 2-587-513-4556  [employer]/resources for a list of additional resources (SOS)  Trans Lifeline a hotline for transgender people 0-231-857-9025  The ally[employer]VM a hotline for LGBT youth 4-888-272-3158  Crisis Text Line: For any crisis 24/7   To: 940235  see www.crisistextline.org  - IF MAKING A CALL FEELS TOO HARD, send a text!         Again thank you for choosing PSYCHIATRY CLINIC and please let us know how we can best partner with you to improve you and your family's health.    You may be receiving a survey regarding this appointment. We would love to have your feedback, both positive and negative. The survey is done by an external company, so your answers are anonymous.

## 2020-10-01 ENCOUNTER — VIRTUAL VISIT (OUTPATIENT)
Dept: PSYCHIATRY | Facility: CLINIC | Age: 20
End: 2020-10-01
Attending: NURSE PRACTITIONER
Payer: COMMERCIAL

## 2020-10-01 ENCOUNTER — MYC MEDICAL ADVICE (OUTPATIENT)
Dept: INTERNAL MEDICINE | Facility: CLINIC | Age: 20
End: 2020-10-01

## 2020-10-01 DIAGNOSIS — Z79.899 HIGH RISK MEDICATIONS (NOT ANTICOAGULANTS) LONG-TERM USE: Primary | ICD-10-CM

## 2020-10-01 DIAGNOSIS — F31.9 BIPOLAR AFFECTIVE DISORDER, REMISSION STATUS UNSPECIFIED (H): ICD-10-CM

## 2020-10-01 PROCEDURE — 99214 OFFICE O/P EST MOD 30 MIN: CPT | Mod: 95 | Performed by: NURSE PRACTITIONER

## 2020-10-01 RX ORDER — LITHIUM CARBONATE 450 MG
450 TABLET, EXTENDED RELEASE ORAL 2 TIMES DAILY
Qty: 60 TABLET | Refills: 2 | Status: SHIPPED | OUTPATIENT
Start: 2020-10-01 | End: 2020-10-07 | Stop reason: DRUGHIGH

## 2020-10-01 NOTE — PATIENT INSTRUCTIONS
Treatment Plan Today:   Change lithium timing to 450 mg twice daily    Pleases complete your lithium labs this week.  It is important that the lab is drawn 10-12 hours after your last lithium dose.      ------------------------------------------------------------------------    Thank you for coming to the [facility] MENTAL HEALTH & ADDICTION [place] [facility].    Lab Testing:  If you had lab testing today and your results are reassuring or normal they will be mailed to you or sent through [employer] within 7 days. If the lab tests need quick action we will call you with the results. The phone number we will call with results is # [phone] (home) . If this is not the best number please call our clinic and change the number.    Medication Refills:  If you need any refills please call your pharmacy and they will contact us. Our fax number for refills is [phone]. Please allow three business for refill processing. If you need to  your refill at a new pharmacy, please contact the new pharmacy directly. The new pharmacy will help you get your medications transferred.     Scheduling:  If you have any concerns about today's visit or wish to schedule another appointment please call our office during normal business hours [phone] (8-5:00 M-F)    Contact Us:  Please call [phone] during business hours (8-5:00 M-F).  If after clinic hours, or on the weekend, please call  [phone].    Financial Assistance [phone]  [facility] Billing [phone]  Central Billing Office, [facility]: [phone]  [facility] Billing [phone]  Medical Records [phone]      MENTAL HEALTH CRISIS NUMBERS:  For a medical emergency please call  911 or go to the nearest ER.     [facility]:   [facility] -[phone]   Crisis Residence [facility] -[phone]   Walk-In Counseling Center [facility] -[phone]   COPE 24/7 [place] Team -[phone] (adults)/[phone]  (child)  CHILD: [facility] needs assessment team - [phone]      [facility]:   [facility] - [phone]   Walk-in counseling [facility] - [phone]   Walk-in counseling [facility] - [phone]   Crisis Residence [facility] - [phone]  Urgent Care Adult Mental Yozfmp-[phone] mobile unit/ 24/7 crisis line    National Crisis Numbers:   National Suicide Prevention Lifeline: 1-707-016-TALK (510-030-2983)  Poison Control Center - 2-219-093-4747  [facility]/resources for a list of additional resources (SOS)  Trans Lifeline a hotline for transgender people 5-797-711-3051  The [employer] a hotline for LGBT youth 9-927-285-5548  Crisis Text Line: For any crisis 24/7   To: 413803  see www.crisistextline.org  - IF MAKING A CALL FEELS TOO HARD, send a text!         Again thank you for choosing [facility] MENTAL HEALTH & ADDICTION [facility] and please let us know how we can best partner with you to improve you and your family's health.    You may be receiving a survey regarding this appointment. We would love to have your feedback, both positive and negative. The survey is done by an external company, so your answers are anonymous.

## 2020-10-01 NOTE — Clinical Note
Kin Joe - could you keep an eye on this patient's lab orders next week?  If lithium labs haven't been completed within 1 week I'd like him to have a reminder call.  Thanks!

## 2020-10-01 NOTE — PROGRESS NOTES
"TELEPHONE VISIT  Alexandru Mitchell is a 20 year old pt. who is being evaluated via a billable telephone visit.      The patient has been notified of the following:    We have found that certain health care needs can be provided without the need for a physical exam. This service lets us provide the care you need with a short phone conversation. If a prescription is necessary we can send it directly to your pharmacy. If lab work is needed we can place an order for that and you can then stop by our lab to have the test done at a later time. Insurers are generally covering virtual visits as they would in-office visits so billing should not be different than normal.  If for some reason you do get billed incorrectly, you should contact the billing office to correct it and that number is in the AVS .    Patient has given verbal consent for a telephone visit?:  Yes   How would the pt like to obtain the AVS?:  [employer]S SmartPhrase [PsychAVS] has been placed in 'Patient Instructions':  Yes   Patient is located in [place].     Start Time:  8:10 AM         End Time:  8:35 AM      Psychiatry Clinic Telephone Encounter (telehealth)                                    Alexandru Mitchell is a 20 year old male who prefers the name Alexandru and pronoun he, him, his.  Therapist: None   PCP: Jerrica Billingsley     Chief Complaint                                                                                                        \" Overall stable \"      History of Present Illness                                                                                4, 4     -Alexandru is a 20-year-old male with bipolar 1 disorder, RACHEL, panic attacks and r/o trauma related symptoms.  He was last seen by me on 9/3/20, at which time he was returning to the clinic after approx 6 months in [place] due to COVID-19 impacts, and reported he had recently stopped lamictal.  Lithium labs have not yet been completed.  He reports today that he has been feeling well overall since " "his last visit, is managing increased school load.  He reports that when taking lithium in the PM, he was having difficulty falling asleep, taking 2-3 hours to fall asleep.  He reports his energy increases after taking the lithium so he has been trying to take this earlier in the day, which has helped with his sleep onset (can fall asleep within an hour).    -He reports he has been having more difficulty concentrating in class and feeling \"foggy.\" Does think this has worsened since taking the lithium in the morning.    -He reports that overall his mood has been \"stable\" and he denies episodes of hypomania or depression.  Last episode of depression was in April following his DWI.  Denies death ideation or SI, intent or plan.    -Has not used any alcohol or any other substances recently.  -Has been using  E-cigarettes, at a reduced rate than usual, and he is hoping to continue to taper off.       Medication SE/ROS:  Poor concentration/cognitive dulling, increased thirst and increased urination.  The increased thirst and urination has not recently increased.  He also reports some sexual SE which began with his new relationship. Denies tremor, nausea, vomiting, chest pain, racing heart, SOB.       Current psychiatric medications  Lithium 900 mg at bedtime  Metroprolol 50 mg daily (managed by PCP)    Psychiatric Review of Symptoms:   Depression:  see HPI  Elevated:  none currently   Psychosis:  none  Anxiety:  increased worry during courses, otherwise minimal        Psychiatric History (no change today)     Suicide Attempt:   #- 3 by overdose on OTC medications; no medical attention sought.  Most Recent- 16 year old  SIB- cutting and scratching   Mary Beth- first episode- possibly as young as 12 years old characterized by hypomania.  Most recent manic episode in 10/2019.    Psychosis- AH, paranoia  Only during manic episodes.      Social History  FINANCIAL SUPPORT- UNKNOWN       CHILDREN- None       EARLY HISTORY/ EDUCATION- " "Currently student at [employer] studying [employer]    Family  hx - maternal grandfather and great uncle with bipolar disorder.  Maternal great uncle  by suicide        Psychiatric Medication Trials (no change today)     From [facility] note from Eliane Sullivan:      - Propranolol by PCP for anxiety - not helpful     -2017, Prozac (dose unknown) mainly for anxiety. Pt reports little to no symptom reduction with Prozac. Does recall increased suicidality before and during Prozac (compared to now), but does not think Prozac worsened suicidality or irritability.       2017 established with psychiatrist, Ruben Fuentes in [place]:  During 2017 was started on:   - venlafaxine, possibly was up to 225mg daily, but decreased to 150mg after ~6 months d/t increased irritability and \"feeling numb, no emotion\".  Denies worsening SI, unsure if mood cycling worsened.   - lamotrigine, 200mg is highest dose. Started for \"mood cycling\", psychiatrist didn't diagnose him with bipolar.    - perphenazine-amitriptyline 2-10mg. Started possibly for sleep/anxiety.  Pt does endorse hallucinations during periods of depression and jose raul, denies hallucinations when euthymic.     2907-5375 amitriptyline (without perphenazine)(dose unknown) - discontinued d/t urinary retention    - metoprolol started for tachycardia      - nortriptyline started by neuro for migraine ppx.                                  Medical / Surgical History     Patient Active Problem List   Diagnosis     Generalized anxiety disorder     Social anxiety disorder     Migraine with aura, not intractable, without status migrainosus       Past Surgical History:   Procedure Laterality Date     HEAD & NECK SURGERY       TONSILLECTOMY           Medical Review of Systems                                                                                                    2, 10     A review of systems, by telephone, was performed and is negative other than noted in the " "HPI.     Allergy   Patient has no known allergies.   Current Medications     Current Outpatient Medications   Medication Sig Dispense Refill     lithium 300 MG capsule Take 3 capsules (900 mg) by mouth daily 90 capsule 2     metoprolol succinate ER (TOPROL-XL) 50 MG 24 hr tablet Take 1 tablet (50 mg) by mouth daily 30 tablet 3     nortriptyline (PAMELOR) 25 MG capsule Take 1 capsule (25 mg) by mouth At Bedtime 30 capsule 1      Vitals                                                                                                                        3, 3     There were no vitals taken for this visit. - telephone visit        Mental Status Exam                                                                                   9, 14 cog gs     Alertness: alert  and oriented  Appearance: not assessed, telephone encounter  Behavior/Demeanor: cooperative, pleasant  Speech: regular rate and rhythm  Language: intact  Psychomotor: not assessed  Mood: \"stable\"   Affect: seemed euthymic via telephone  Thought Process/Associations: unremarkable  Thought Content:  Reports none;  Denies suicidal ideation, violent ideation and delusions  Perception:  Reports none;  Denies auditory hallucinations and visual hallucinations  Insight: adequate  Judgment: adequate for safety  Cognition: (6) attention span: intact  concentration: intact  recent memory: intact  remote memory: intact  fund of knowledge: appropriate  Gait and Station: not assessed      Labs and Data       PHQ9 Today: not completed   PHQ-9 SCORE 12/9/2019   PHQ-9 Total Score 15         Recent Labs   Lab Test 02/18/20  1057   CR 0.72   GFRESTIMATED >90     No lab results found.    Diagnosis, Assessment & Plan                                                                            m2, h3     Provisional DSM-5 diagnoses:    -Bipolar Disorder I with psychotic features   -Generalized Anxiety Disorder with panic attacks, r/o panic disorder  -R/O trauma and stressor related " disorder    Assessment  Alexandru Mitchell is a 20-year-old with a history of the diagnoses listed above.  In addition, he reports traumatic loss of family member to suicide at age 11, which precipitated worsening of MH symptoms, and symptoms of a trauma/stressor related disorder should be further assessed.  He has a genetic loading for bipolar disorder (maternal grandfather and great uncle).        Today, Alexandru reports improvements in mood stability with consistent use of lithium. Continues to have some SE related to lithium including cognitive dulling. Due to mood improvements with lithium, he will first try to split the dose to target SE prior to considering a medication change.  Discussed risk of depression now that lamictal is discontinued, and he agrees to contact the clinic if any concern for depression arises.  He is overdue on lithium labs and agrees to complete these this week.   He denies SI, intent or plan and risk of harm to self or others is low.      Bipolar disorder  Change lithium to 450 mg BID    Psychosis symptoms  Resolved with jose raul, no current antipsychotic medication required    Insomnia  No longer using trazodone, will discontinue, sleep has improved with earlier dosing of lithium    Anxiety  Metoprolol originally prescribed for tachycardia, will defer to PCP for management.    Recommended CBT for anxiety     Other psychiatric medications  Pamelor - use only as needed for headaches, defer to PCP for management     Long term management of high risk medications  2/18/20: BMP, TSH WNL  Ordered last visit, not yet completed: lithium level, BMP, TSH     RTC: 8-12 weeks or sooner if needed     CRISIS NUMBERS:   Provided routinely in AVS.      Treatment Risk Statement:  The patient understands the risks, benefits, adverse effects and alternatives. Agrees to treatment with the capacity to do so. No medical contraindications to treatment. Agrees to call clinic for any problems. The patient understands to call  911 or go to the nearest ED if life threatening or urgent symptoms occur.

## 2020-10-07 ENCOUNTER — TELEPHONE (OUTPATIENT)
Dept: PSYCHIATRY | Facility: CLINIC | Age: 20
End: 2020-10-07

## 2020-10-07 DIAGNOSIS — Z79.899 HIGH RISK MEDICATIONS (NOT ANTICOAGULANTS) LONG-TERM USE: ICD-10-CM

## 2020-10-07 DIAGNOSIS — Z51.81 ENCOUNTER FOR THERAPEUTIC DRUG MONITORING: Primary | ICD-10-CM

## 2020-10-07 DIAGNOSIS — F31.9 BIPOLAR AFFECTIVE DISORDER, REMISSION STATUS UNSPECIFIED (H): ICD-10-CM

## 2020-10-07 LAB
ANION GAP SERPL CALCULATED.3IONS-SCNC: 8 MMOL/L (ref 3–14)
BUN SERPL-MCNC: 8 MG/DL (ref 7–30)
CALCIUM SERPL-MCNC: 9.4 MG/DL (ref 8.5–10.1)
CHLORIDE SERPL-SCNC: 108 MMOL/L (ref 94–109)
CO2 SERPL-SCNC: 24 MMOL/L (ref 20–32)
CREAT SERPL-MCNC: 0.97 MG/DL (ref 0.66–1.25)
GFR SERPL CREATININE-BSD FRML MDRD: >90 ML/MIN/{1.73_M2}
GLUCOSE SERPL-MCNC: 92 MG/DL (ref 70–99)
LITHIUM SERPL-SCNC: 0.52 MMOL/L (ref 0.6–1.2)
POTASSIUM SERPL-SCNC: 4 MMOL/L (ref 3.4–5.3)
SODIUM SERPL-SCNC: 140 MMOL/L (ref 133–144)
TSH SERPL DL<=0.005 MIU/L-ACNC: 0.9 MU/L (ref 0.4–4)

## 2020-10-07 PROCEDURE — 80178 ASSAY OF LITHIUM: CPT | Performed by: NURSE PRACTITIONER

## 2020-10-07 PROCEDURE — 84443 ASSAY THYROID STIM HORMONE: CPT | Performed by: NURSE PRACTITIONER

## 2020-10-07 PROCEDURE — 80048 BASIC METABOLIC PNL TOTAL CA: CPT | Performed by: NURSE PRACTITIONER

## 2020-10-07 PROCEDURE — 36415 COLL VENOUS BLD VENIPUNCTURE: CPT | Performed by: NURSE PRACTITIONER

## 2020-10-07 RX ORDER — LITHIUM CARBONATE 300 MG/1
600 TABLET, FILM COATED, EXTENDED RELEASE ORAL 2 TIMES DAILY
Qty: 120 TABLET | Refills: 1 | Status: SHIPPED | OUTPATIENT
Start: 2020-10-07 | End: 2020-12-03

## 2020-10-07 NOTE — TELEPHONE ENCOUNTER
[facility] Call Center    Phone Message    May a detailed message be left on voicemail: yes     Reason for Call: Other: call back      Pt returned Tatyana's call.     Writer read the note and asked pt for the clarification requested, and pt reported the test was done 12 hours 40 minutes after the last dose of lithium taken.    Call back number is [phone]    Action Taken: Message routed to:  Other: nursing pool    Travel Screening: Not Applicable

## 2020-10-07 NOTE — TELEPHONE ENCOUNTER
Returned phone call to pt. He confirms his labs were drawn about 12.5 hours after his bedtime lithium dose.     Reviewed Patsy Dominique's recommendation of increasing lithium dose to 600mg BID. Pt is agreeable with this change. He was instructed to repeat his lithium level 7 days after increasing the dose. Reminded him that lithium labs should be obtained 12-hours after last dose.  Instructed pt to call clinic should be experience vomiting or diarrhea d/t increased risk for dehydration and lithium toxicity. Also instructed pt to call should he develop any confusion, lack of coordination, excessive thirst/urination, or tremor. Encouraged pt to avoid use of NSAIDs and ETOH/substance use. Pt expressed understanding.     Submitted new prescription for Lithium 600mg BID. Spoke with pharmacy and requested they cancel the previous rx on file for lithium 450mg CR tablets.

## 2020-10-07 NOTE — TELEPHONE ENCOUNTER
----- Message -----  From: Nataliya Dominique APRN CNP  Sent: 10/7/2020   1:27 PM CDT  To: SURJIT Moseley,  Can you please follow up with Alexandru and find out if this lab was drawn in the appropriate window after lithium was taken?  If not, please ask him to repeat.  If it was, I would recommend that he increases lithium to 1200 mg daily (can split the dose BID) and recheck the level 7 days after dose increase.      Patsy Padilla    Follow-up:  Placed phone call to pt in follow-up as per above. No answer; LVM requesting a c/b today if possible.

## 2020-10-16 ENCOUNTER — TELEPHONE (OUTPATIENT)
Dept: PSYCHIATRY | Facility: CLINIC | Age: 20
End: 2020-10-16

## 2020-10-16 DIAGNOSIS — Z51.81 ENCOUNTER FOR THERAPEUTIC DRUG MONITORING: ICD-10-CM

## 2020-10-16 DIAGNOSIS — Z51.81 ENCOUNTER FOR THERAPEUTIC DRUG MONITORING: Primary | ICD-10-CM

## 2020-10-16 LAB — LITHIUM SERPL-SCNC: 0.29 MMOL/L (ref 0.6–1.2)

## 2020-10-16 PROCEDURE — 36415 COLL VENOUS BLD VENIPUNCTURE: CPT | Performed by: NURSE PRACTITIONER

## 2020-10-16 PROCEDURE — 80178 ASSAY OF LITHIUM: CPT | Performed by: NURSE PRACTITIONER

## 2020-10-16 NOTE — TELEPHONE ENCOUNTER
----- Message -----  From: Nataliya Dominique APRN CNP  Sent: 10/16/2020   1:34 PM CDT  To: SURJIT Moseley,  Can you follow up with Alexandru re: his most recent lithium lab?  This lab should reflect his level after the dose was increased from 900 to 1200 mg daily.  Can you ask him about compliance and timing of the lab with the last dose?  He will likely need to repeat the lab.     Valerie,  Patsy    Follow-up:  Placed phone call to pt in follow-up. Reviewed 10/16 lithium level, which is lower than the previous level on 10/7, despite the pt being instructed to increase his dose to 600mg BID since the last draw. Pt had labs drawn approximately 12.5 hours after his last dose. It was then discovered that pt has only been taking 1 tab of lithium BID, which is 300mg twice daily. He is currently prescribed 600mg BID. He notes he inadvertently decreased his dose instead of increasing it as instructed. Pt denies any return of symptoms due to the inadvertent dose decrease. He denies any suicidal thoughts or other safety concerns.    Spoke with Patsy Dominique. Pt should increase to the prescribed dose of 600mg BID and obtain a repeat lithium level in 7 days.    Returned phone call to pt. Instructed him to increase his lithium dose to 600mg BID and repeat his lithium level in 7 days. Instructed pt to double-check his prescription bottle containing his current medication supply to ensure he will be taking two of the 300mg tablets twice daily. Pt expressed understanding.

## 2020-10-22 ENCOUNTER — OFFICE VISIT (OUTPATIENT)
Dept: INTERNAL MEDICINE | Facility: CLINIC | Age: 20
End: 2020-10-22
Payer: COMMERCIAL

## 2020-10-22 VITALS
OXYGEN SATURATION: 95 % | DIASTOLIC BLOOD PRESSURE: 86 MMHG | SYSTOLIC BLOOD PRESSURE: 126 MMHG | BODY MASS INDEX: 34.03 KG/M2 | HEART RATE: 81 BPM | WEIGHT: 244 LBS

## 2020-10-22 DIAGNOSIS — F31.9 BIPOLAR I DISORDER (H): Primary | ICD-10-CM

## 2020-10-22 PROCEDURE — 90471 IMMUNIZATION ADMIN: CPT | Performed by: STUDENT IN AN ORGANIZED HEALTH CARE EDUCATION/TRAINING PROGRAM

## 2020-10-22 PROCEDURE — 90686 IIV4 VACC NO PRSV 0.5 ML IM: CPT | Performed by: STUDENT IN AN ORGANIZED HEALTH CARE EDUCATION/TRAINING PROGRAM

## 2020-10-22 PROCEDURE — 99395 PREV VISIT EST AGE 18-39: CPT | Mod: 25 | Performed by: STUDENT IN AN ORGANIZED HEALTH CARE EDUCATION/TRAINING PROGRAM

## 2020-10-22 ASSESSMENT — ENCOUNTER SYMPTOMS
NERVOUS/ANXIOUS: 0
PANIC: 0
DECREASED CONCENTRATION: 1
INSOMNIA: 0
DEPRESSION: 0

## 2020-10-22 ASSESSMENT — PAIN SCALES - GENERAL: PAINLEVEL: NO PAIN (0)

## 2020-10-22 NOTE — PATIENT INSTRUCTIONS
[facility] Medication Refill Request Information:  * Please contact your pharmacy regarding ANY request for medication refills.  ** [facility] Prescription Fax = [phone]  * Please allow 3 business days for routine medication refills.  * Please allow 5 business days for controlled substance medication refills.     [facility] Test Result notification information:  *You will be notified with in 7-10 days of your appointment day regarding the results of your test.  If you are on MyChart you will be notified as soon as the provider has reviewed the results and signed off on them.    [facility]: [phone]

## 2020-10-22 NOTE — PROGRESS NOTES
"                     PRIMARY CARE CENTER       SUBJECTIVE:  Alexandru Mitchell is a 20 year old male with a PMHx of bipolar disorder I, RACHEL and panic attacks who comes in for an annual physical. Patient has no complaints except for \"haziness\" and tiredness since increasing his lithium to 450 mg BID. He is due to check his lithium level tomorrow and said he wants to talk to psychiatry about his new symptoms from the higher dose. Otherwise, patient's mood has been stable, he has a supportive girlfriend and a roommate that he gets along with and has not been particularly stressed or depressed lately. Denies any suicidal ideations or thoughts of harming himself. Denies palpitations while on metoprolol 50 mg daily.  He would like the flu shot today.     Current Outpatient Medications   Medication     lithium ER (LITHOBID) 300 MG CR tablet     metoprolol succinate ER (TOPROL-XL) 50 MG 24 hr tablet     nortriptyline (PAMELOR) 25 MG capsule     No current facility-administered medications for this visit.         No Known Allergies      Review of systems:    General:  No weight loss, appetite loss, fatigue, tiredness, general weakness  Eyes:  No vision loss  Ears:  No hearing loss, tinnitus, ear pain  Nose:  No nasal discharge, sneezing, sinus congestion  Throat:  No throat pain, trouble swallowing or painful swallowing  Head:  No rashes, pain, deformities  Pulmonary:  No SOB, DUCKWORTH, wheezing, pleurisy, hemoptysis, cough, snoring/apnea and daytime sleepiness  Cardiovascular:  No palpitations, passing out spells (syncope), chest pain/pressure, leg swelling, orthopnea, PND  GI:  No nausea, vomiting, abdominal pain, diarrhea, constipation, rectal bleeding, heartburn/reflux  : No genital rashes or lesions, penile discharge, erectile dysfunction, testicular masses, hernias, urinary frequency, hesitancy, urgency, incomplete emptying, nocturia, incontinence, flank pain  Heme/lymph:  No lymph node swelling or easy bruising  Endocrine:  No " new intolerance to heat or cold, excessive thirst or urination  Skin:  No skin lesions or concerning moles,rashes, jaundice.    Allergic: no pruritis, rashes, conjunctival/oropharyngial swelling/redness, rhinorrhea   M/S:  No new arthralgias, myalgias, joint swelling, redness or deformities  Neuro: No new headaches, dizziness or vertigo, numbness, tingling, weakness, unsteadiness, speech slurring, confusion, memory loss, problems with coordination, tremors  Psych:  No new depression, anxiety, insomnia.    OBJECTIVE:    /86 (BP Location: Right arm, Patient Position: Sitting, Cuff Size: Adult Large)   Pulse 81   Wt 110.7 kg (244 lb)   SpO2 95%   BMI 34.03 kg/m     Wt Readings from Last 1 Encounters:   10/22/20 110.7 kg (244 lb)     Physical Examination:    General:  Conversant, generally healthy appearing, no acute distress  Head: atraumatic  Eyes:  Pupils 2-3 mm, sclera white, EOM's full, conjunctiva moist, no periorbital swelling    Lungs:  Clear to auscultation throughout, no wheezes, rhonchi or rales. Normal respiratory effort and no intercostal retractions.  C/V:  Regular, slightly tachycardic rate and rhythm, no murmurs, rubs or gallops.  No JVD, no carotid bruits. Radial and DP pulses 2+, equal and regular.  Abdomen:  Not distended.  Bowel sounds active.  No tenderness, no hepatosplenomegaly or masses.  No CVA tenderness or masses.  Neuro: Alert and oriented, face symmetric. Able to get on/off exam table without assistance.  Strength grossly intact. No tremor.  Gait steady.   M/S:   No joint deformities noted.  No joint swelling.  Skin:   Normal temperature., turgor and texture. No rashes, suspicious lesions, or jaundice on exposed skin surfaces.   Extremities:  No peripheral edema, no digital cyanosis  Psych:  Alert and oriented. Appropriate affect.  Not psychomotor slowed.  No signs of anxiety or agitation.     ASSESSMENT/PLAN:    Mr Mitchell was seen today for an annual physical exam.    Bipolar  disorder I  Stable mood, no depressive symptoms or increased agitation, patient endorses side effects of lithium and was advised to message psychiatry through [facility] for dose adjustement or appt sooner than December.  - Continue PTA lithium, metoprolol and nortriptyline    Pt should return to clinic for f/u with Dr Billingsley in one year.      Yasmeen Katz MD  Oct 22, 2020  Internal Medicine PGY-1 resident  [phone]    Pt was seen and examined by me; I agree with the detailed A/P documentation above    Alba Gonzalez MD    Answers for HPI/ROS submitted by the patient on 10/22/2020   General Symptoms: No  Skin Symptoms: No  HENT Symptoms: No  EYE SYMPTOMS: No  HEART SYMPTOMS: No  LUNG SYMPTOMS: No  INTESTINAL SYMPTOMS: No  URINARY SYMPTOMS: No  REPRODUCTIVE SYMPTOMS: No  SKELETAL SYMPTOMS: No  BLOOD SYMPTOMS: No  NERVOUS SYSTEM SYMPTOMS: No  MENTAL HEALTH SYMPTOMS: Yes  Nervous or Anxious: No  Depression: No  Trouble sleeping: No  Trouble thinking or concentrating: Yes  Mood changes: No  Panic attacks: No

## 2020-10-22 NOTE — NURSING NOTE
Chief Complaint   Patient presents with     Physical     pt here for physical       Dl Gordon CMA, EMT at 8:58 AM on 10/22/2020.

## 2020-10-23 DIAGNOSIS — Z51.81 ENCOUNTER FOR THERAPEUTIC DRUG MONITORING: ICD-10-CM

## 2020-10-23 DIAGNOSIS — Z79.899 HIGH RISK MEDICATION USE: ICD-10-CM

## 2020-10-23 LAB
ANION GAP SERPL CALCULATED.3IONS-SCNC: 5 MMOL/L (ref 3–14)
BUN SERPL-MCNC: 11 MG/DL (ref 7–30)
CALCIUM SERPL-MCNC: 8.9 MG/DL (ref 8.5–10.1)
CHLORIDE SERPL-SCNC: 109 MMOL/L (ref 94–109)
CO2 SERPL-SCNC: 23 MMOL/L (ref 20–32)
CREAT SERPL-MCNC: 0.84 MG/DL (ref 0.66–1.25)
GFR SERPL CREATININE-BSD FRML MDRD: >90 ML/MIN/{1.73_M2}
GLUCOSE SERPL-MCNC: 101 MG/DL (ref 70–99)
LITHIUM SERPL-SCNC: 0.64 MMOL/L (ref 0.6–1.2)
POTASSIUM SERPL-SCNC: 4.2 MMOL/L (ref 3.4–5.3)
SODIUM SERPL-SCNC: 137 MMOL/L (ref 133–144)
TSH SERPL DL<=0.005 MIU/L-ACNC: 0.74 MU/L (ref 0.4–4)

## 2020-10-23 PROCEDURE — 36415 COLL VENOUS BLD VENIPUNCTURE: CPT | Performed by: NURSE PRACTITIONER

## 2020-10-23 PROCEDURE — 80178 ASSAY OF LITHIUM: CPT | Performed by: NURSE PRACTITIONER

## 2020-10-23 PROCEDURE — 84443 ASSAY THYROID STIM HORMONE: CPT | Performed by: NURSE PRACTITIONER

## 2020-10-23 PROCEDURE — 80048 BASIC METABOLIC PNL TOTAL CA: CPT | Performed by: NURSE PRACTITIONER

## 2020-10-26 ENCOUNTER — TELEPHONE (OUTPATIENT)
Dept: PSYCHIATRY | Facility: CLINIC | Age: 20
End: 2020-10-26

## 2020-10-26 NOTE — TELEPHONE ENCOUNTER
---- Message -----  From: Nataliya Dominique APRN CNP  Sent: 10/26/2020  10:34 AM CDT  To: SURJIT Moseley,  Can you follow up with Alexandru about his most recent lab?    The level is now in the therapeutic range, but on the lower end of the range.  Often, levels of 0.8-1.0 provide better effect for mood symptoms (both depression and jose raul/hypomania).  However, it is OK to stay at his current dose if this is what he prefers from a tolerability stand point as he is does report some side effects with lithium.    Let's first confirm the timing of this lab.  If it was drawn 7 days after returning to the 1200 mg daily dose, and drawn within 12 hours of his bedtime lithium does, he may wish to increase the dose to a total daily dose of 1500 mg daily.   If he does increase the dose, we will need to repeat his lithium level 7 days after starting the higher dose.     Thank you,  Nataliya Dominique CNP, 10/26/2020 10:32 AM    Follow-up:  Left  for pt. Asked for a return phone call to discuss recent lab results.

## 2020-10-27 NOTE — TELEPHONE ENCOUNTER
FW: Pt returning call  Received: Today  Message Contents   Dewey Underwood RN Patel, Radhika, RN   Phone Number: [phone]             Distributing Tatyana's messages....    Previous Messages    ----- Message -----   From: Haylee Ceballos   Sent: 10/26/2020   4:52 PM CDT   To: Tatyana Friedman RN   Subject: Pt returning call                                 Albertina Joe,     Pt said he was returning a call from you.   [phone]       Thank you!   Haylee         Writer returned a call to patient to relay provider recommendations regarding Lithium labs. No answer at number provided. LVM, requesting a call back. Clinic number provided.

## 2020-10-28 NOTE — TELEPHONE ENCOUNTER
Yes, the symptoms that he is describing could likely be due to lithium side effects, especially if he is not experiencing low mood or anhedonia.  We recently moved the lithium to twice daily dosing in order to help with other medication SE, however taking lithium in the morning often leads to having more cognitive side effects during the day.  He could try consolidating the dose again to the evening (around dinner time may be better for him than bedtime), or he could take less in the morning and more in the evening - 300 mg qAM and 900 mg at bedtime.      If he feels depression symptoms - aside from the cognitive symptoms he is reporting -  are worsening then he may benefit from a higher dose of lithium or from restarting the lamictal.  Increasing the lithium dose may worsen the cognitive side effects, so I would first recommend that he tries to change the timing of the dose as described above to see if this helps.  If he wishes to restart lamictal we can also pursue that now.     Thanks,  Nataliya Dominique, CNP, 10/28/2020 3:01 PM

## 2020-10-28 NOTE — TELEPHONE ENCOUNTER
"Placed additional phone call to pt in follow-up. Reviewed pt's most recent lab. He states his last lithium level was drawn \"6.5 days\" after he increased his dose to 600mg BID. The level was drawn within 10-12 hours of his last dose.     The pt denies symptoms of jose raul/hypomania. He does note concerns for a potential increase in depression. He describes feeling \"foggy\" during the day and confused at times during class. His classmates have made comments on his slower than usual response time to questions. Pt reports that he actually feels happy mentally, but physically he fatigues easily and is also struggling with motivation. He rates his energy level at about a 5/10. No changes in appetite or sleep, although he at times struggles to fall asleep. He denies \"dark thoughts\" including SI or HI. He is not sure if his symptoms could be related to a higher lithium dose or if he is experiencing worsening depression. He notes that he was told by his provider that there is a risk for increase depression since stopping Lamictal.    Will route update to provider, Patsy Dominique, for further input regarding medication recommendations. Pt is open to increasing lithium dose, but is also wondering if the confusion, fatigue, and \"hazziness\" could be due to the lithium.  "

## 2020-10-28 NOTE — TELEPHONE ENCOUNTER
Spoke with pt and reviewed provider's recommendations as per below. Pt is in agreement with consolidating his lithium to nighttime administration. He will try taking 1200mg around dinner. He will call clinic if depression symptoms worsen and/or if he decides he would like to either increase his lithium dose or re-start Lamictal.

## 2020-11-10 DIAGNOSIS — I10 ESSENTIAL HYPERTENSION: ICD-10-CM

## 2020-11-10 RX ORDER — METOPROLOL SUCCINATE 50 MG/1
50 TABLET, EXTENDED RELEASE ORAL DAILY
Qty: 30 TABLET | Refills: 0 | OUTPATIENT
Start: 2020-11-10

## 2020-11-10 RX ORDER — METOPROLOL SUCCINATE 50 MG/1
50 TABLET, EXTENDED RELEASE ORAL DAILY
Qty: 90 TABLET | Refills: 3 | Status: SHIPPED | OUTPATIENT
Start: 2020-11-10 | End: 2021-07-07

## 2020-11-10 NOTE — TELEPHONE ENCOUNTER
[facility] Call Center    Phone Message    May a detailed message be left on voicemail: yes     Reason for Call: Medication Refill Request    Has the patient contacted the pharmacy for the refill? Yes   Name of medication being requested: metoprolol succinate ER (TOPROL-XL) 50 MG 24 hr tablet  Provider who prescribed the medication: Dr Billingsley  Pharmacy:      [facility] [phone]    Date medication is needed: ASAP pt is out- Writer told pt to call the pharmacy to have them fax the clinic  Pt said the pharmacy was told to call the clinic as no more refills.         Action Taken: Message routed to:  [facility]    Travel Screening: Not Applicable

## 2020-11-17 ENCOUNTER — TELEPHONE (OUTPATIENT)
Dept: PSYCHIATRY | Facility: CLINIC | Age: 20
End: 2020-11-17

## 2020-11-17 DIAGNOSIS — F31.9 BIPOLAR AFFECTIVE DISORDER, REMISSION STATUS UNSPECIFIED (H): Primary | ICD-10-CM

## 2020-11-17 NOTE — TELEPHONE ENCOUNTER
"Writer called pt to explore his concerns.    Pt was unable to identify any \"traditional\" triggers of depression, but started feeling physical symptoms of depression around 3 weeks ago (time of stabilization of lithium level).    Pt describes symptoms of: Difficult to get out of bed, tired all the time, little energy (walking short distances tires him out), difficult to get excited about or interested in things, difficult to focus, tired after a few hours awake, increased irritability (recently taking things personally when they weren't personal).  Sleep has been inconsistent, difficult to fall asleep, difficult to get out of bed.    Pt reports there is typically no seasonal component to his mood (he enjoys winter). Pt denies alcohol, street drug use.    Pt stated that he has \"no actual intention\" for suicide, but in last three weeks has experienced a \"pronounced increase\" (more numerous and prolonged) in SI that he describes as more like thoughts of \"what if I were dead\" than clear thoughts of suicide.  Pt reports that he is able to curtail them and be logical about it. These thoughts have never vanished in previous years of treatment.    Writer explored pt's safety plan for if these thoughts become unmanageable: He would let people around him know, go to ED.    Pt identified interest in restarting Lamictal, if provider chooses.    Routed to provider.                "

## 2020-11-17 NOTE — TELEPHONE ENCOUNTER
[facility] Call Center    Phone Message    May a detailed message be left on voicemail: yes     Reason for Call: Other:    Patient called to report he has been feeling increased depression in the last 3 weeks. He noted he has previously been prescribed lamotrigine before coming to this clinic and is wondering if that might be helpful. If any changes are made to medications, patient's preferred pharmacy is  [place], [place] - [address] [place] [address] [phone].    Patient denied having any safety concerns.      Action Taken: Message routed to:  Other: Nurse pool    Travel Screening: Not Applicable

## 2020-11-18 NOTE — TELEPHONE ENCOUNTER
Thank you for following up with him, Dewey.    I support his consideration to restart lamictal as this has been helpful for him previously.    His lithium level is also on the low end.  If he would like to first try an increase in lithium dose we can pursue that, with an increase to 1500 mg at bedtime.  This will put him close to the upper end of the range, which is often required for best effects with depression.    He should start with one or the other (increase lithium or start lamictal).    If he opts to start lamictal, he will start 25 mg daily x 2 weeks, then increase to 50  Mg daily x 2 weeks, then increase to 100 mg daily.  He should schedule an appointment with Dr. Young for approx 4-6 weeks after starting (I will be on maternity leave).  Please review potential side effects, including development of rash, and ask him to contact us immediately if rash develops.  Please also review with him that if he misses 3 doses in a row of the medication, the titration will need to start over.      Nataliya Dominique, CNP, 11/18/2020 10:00 AM

## 2020-11-19 RX ORDER — LAMOTRIGINE 25 MG/1
TABLET ORAL
Qty: 50 TABLET | Refills: 0 | Status: SHIPPED | OUTPATIENT
Start: 2020-11-19 | End: 2020-12-17

## 2020-11-19 NOTE — TELEPHONE ENCOUNTER
Nataliya Dominique APRN CNP Snyder, David J, RN   Caller: Unspecified (2 days ago,  3:57 PM)             That will have to work, however lets also ask him to follow up with Eliane Sullivan 4-6 weeks after starting it.  If he schedules that I will enter another phone note, with instructions for Eliane on further dose titration following the 100 mg dose.     When you talk with Alexandru, can you please ask him what dose of lamictal he previously found to be effective for depression, so we have a sense of the target dose?     Thanks again,   Patsy    Previous Messages    ----- Message -----   From: Dewey Underwood, RN   Sent: 11/18/2020   1:37 PM CST   To: Dewey Underwood RN, *     ----- Message from Dewey Underwood RN sent at 11/18/2020  1:37 PM CST -----   Patsy:   Dr. Young doesn't have any openings earlier than 1/26, which is 9+ weeks after he would be starting Lamictal, if that's the route he chooses. Is that close enough for a follow up?   Pop           Writer left voice mail message for pt, requesting callback to review medication change options.        =========================================    Writer received call from pt, who identified preferring the Lamictal option.  Writer reviewed dosing and SEs to monitor for. Pt identified understanding. Pt reported that he believes that he was on 250 mg dose in the past.    Pt agreed to meet with Dr. Young on 1/26 at 4PM. Routed message to scheduling to convert held time.    Writer set reminder to contact pt on 12/14 for follow up on response to medication prior to ordering 100 mg dose.    Writer e-prescribed medication to requested pharmacy.    Routed message to pharmacist regarding scheduling follow up appointment.

## 2020-12-01 NOTE — PROGRESS NOTES
"VIDEO VISIT  Alexandru Mitchell is a 20 year old patient who is being evaluated via a billable video visit.      The patient has been notified of following:   \"We have found that certain health care needs can be provided without the need for an in-person physical exam. This service lets us provide the care you need with a video conversation. If a prescription is necessary we can send it directly to your pharmacy. If lab work is needed we can place an order for that and you can then stop by our lab to have the test done at a later time. Insurers are generally covering virtual visits as they would in-office visits so billing should not be different than normal.  If for some reason you do get billed incorrectly, you should contact the billing office to correct it and that number is in the AVS .    Patient has given verbal consent for video visit?: Yes   How would you like to obtain your AVS?: BioSET  AVS SmartPhrase [PsychAVS] has been placed in 'Patient Instructions': Yes      Video- Visit Details  Type of service:  video visit for medication management  Time of service:    Date:  12/03/2020    Video Start Time:  12:02 PM        Video End Time:  12:39 PM    Reason for video visit:  Patient unable to travel due to Covid-19/services only offered via telehealth  Originating Site (patient location):  [facility]   Location- Friend or family home  Distant Site (provider location):  Remote location  Mode of Communication:  Video Conference via [employer]  Consent:  Patient has given verbal consent for video visit?: Yes       Psychiatry Clinic Follow Up Visit (telehealth)                                    Alexandru Mitchell is a 20 year old male who prefers the name Alexandru and pronoun he, him, his.  Therapist: None   PCP: Jerrica Billingsley     Chief Complaint                                                                                                        \" Depression, cognitive symptoms\"      History of Present Illness                    " "                                                            4, 4     -Alexandru is a 20-year-old male with bipolar 1 disorder, RACHEL, panic attacks and r/o trauma related symptoms.  He was last seen by me on 10/1/20 at which time no medication changes were made.  In the interim, he has contacted the clinic to report increased symptoms of depression and he opted to restart lamictal on 11/18/20.  Current lamictal dose is 25 mg daily, will be increasing this to 50 mg daily today.    -He reports ongoing symptoms of depression, including low/irrtiable mood, anhedonia, poor appetite, negative thoughts about himself, insomnia, and poor concentration.  Had death ideation on one occasion recently.  Denies SI, intent or plan.  Cognitive symptoms are very distressing to him and he feels these have worsened over the last several weeks, characterized by feeling \"foggy,\" forgetful, and poor concentration.  Has had similar symptoms with episodes of depression in the past.    -Has had increased worries re: academic performance and his legal concerns which occurred last year, however overall feels that anxiety is not significant.  Is doing well in his courses.  Sleep has been \"very poor,\" taking 2-3 hours to fall asleep at night, and then will experience awakenings 1-2x per night.  Sometimes will be up for 2-3 hours when he wakes up in the middle of the night, other times will fall back to sleep within 15 minutes.  Will sleep a total of 7-10 hours per night, depending on schedule.  Previously used trazodone for insomnia, did not find this to be helpful.     -No recent substance use.  Denies any symptoms of psychosis.        Medication SE/ROS:  Poor concentration/cognitive dulling, increased thirst and increased urination.  The increased thirst and urination has not recently increased.    Current psychiatric medications  Lithium 1200 mg at bedtime   Lamictal 25 mg daily  Metroprolol 50 mg daily (managed by PCP)    Psychiatric Review of " "Symptoms:   Depression:  see HPI  Elevated:  none currently   Psychosis:  none  Anxiety:  See HPI       Psychiatric History (no change today)     Suicide Attempt:   #- 3 by overdose on OTC medications; no medical attention sought.  Most Recent- 16 year old  SIB- cutting and scratching   Mary Beth- first episode- possibly as young as 12 years old characterized by hypomania.  Most recent manic episode in 10/2019.    Psychosis- AH, paranoia  Only during manic episodes.      Social History  FINANCIAL SUPPORT- UNKNOWN       CHILDREN- None       EARLY HISTORY/ EDUCATION- Currently student at [employer] studying [employer]    Family  hx - maternal grandfather and great uncle with bipolar disorder.  Maternal great uncle  by suicide        Psychiatric Medication Trials (no change today)     From [facility] note from Eliane Sullivan:      - Propranolol by PCP for anxiety - not helpful     /[id number], Prozac (dose unknown) mainly for anxiety. Pt reports little to no symptom reduction with Prozac. Does recall increased suicidality before and during Prozac (compared to now), but does not think Prozac worsened suicidality or irritability.       2017 established with psychiatrist, Ruben Fuentes in [place]:  During 2017 was started on:   - venlafaxine, possibly was up to 225mg daily, but decreased to 150mg after ~6 months d/t increased irritability and \"feeling numb, no emotion\".  Denies worsening SI, unsure if mood cycling worsened.   - lamotrigine, 200mg is highest dose. Started for \"mood cycling\", psychiatrist didn't diagnose him with bipolar.    - perphenazine-amitriptyline 2-10mg. Started possibly for sleep/anxiety.  Pt does endorse hallucinations during periods of depression and mary beth, denies hallucinations when euthymic.     4027-1970 amitriptyline (without perphenazine)(dose unknown) - discontinued d/t urinary retention    2018- metoprolol started for tachycardia     2018 - nortriptyline started by neuro for migraine ppx.   "                                Medical / Surgical History     Patient Active Problem List   Diagnosis     Generalized anxiety disorder     Social anxiety disorder     Migraine with aura, not intractable, without status migrainosus       Past Surgical History:   Procedure Laterality Date     HEAD & NECK SURGERY       TONSILLECTOMY           Medical Review of Systems                                                                                                    2, 10     Completed ROS is documented in HPI     Allergy   Patient has no known allergies.   Current Medications     Current Outpatient Medications   Medication Sig Dispense Refill     lamoTRIgine (LAMICTAL) 25 MG tablet Take 1 tablet (25 mg) by mouth daily for 14 days, THEN 2 tablets (50 mg) daily for 14 days, THEN 4 tablets (100 mg) daily for 2 days. . Contact clinic with update when ready for refill. 50 tablet 0     lithium ER (LITHOBID) 300 MG CR tablet Take 2 tablets (600 mg) by mouth 2 times daily 120 tablet 1     metoprolol succinate ER (TOPROL-XL) 50 MG 24 hr tablet Take 1 tablet (50 mg) by mouth daily 90 tablet 3      Vitals                                                                                                                        3, 3     There were no vitals taken for this visit.        Mental Status Exam                                                                                   9, 14 cog gs     Alertness: alert  and oriented  Appearance: casually groomed  Behavior/Demeanor: cooperative, pleasant  Speech: regular rate and rhythm  Language: intact  Psychomotor: normal  Mood: depressed  Affect: subdued  Thought Process/Associations: unremarkable  Thought Content:  Reports none;  Denies suicidal ideation, violent ideation and delusions  Perception:  Reports none;  Denies auditory hallucinations and visual hallucinations  Insight: adequate  Judgment: adequate for safety  Cognition: (6) attention span: intact  concentration:  intact  recent memory: intact  remote memory: intact  fund of knowledge: appropriate  Gait and Station: normal      Labs and Data       PHQ9 Today: not completed   PHQ-9 SCORE 12/9/2019   PHQ-9 Total Score 15       Diagnosis, Assessment & Plan                                                                            m2, h3     Provisional DSM-5 diagnoses:    -Bipolar Disorder I with psychotic features   -Generalized Anxiety Disorder with panic attacks, r/o panic disorder  -R/O trauma and stressor related disorder    Alexandru Mitchell is a 20-year-old with a history of the diagnoses listed above.  In addition, he reports traumatic loss of family member to suicide at age 11, which precipitated worsening of MH symptoms, and symptoms of a trauma/stressor related disorder should be further assessed.  He has a genetic loading for bipolar disorder (maternal grandfather and great uncle).      Today, Alexandru reports continued symptoms of depression with prominent cognitive slowing.  Lamictal was recently restarted to address depression.  Lithium dose could also be optimized, and he would like to increase lithium today.  He denies SI, intent or plan and risk of harm to self or others is low.      Bipolar disorder  Increase lithium to 1500 mg at bedtime  Increase lamictal to 50 mg daily x 2 weeks, then increase to 100 mg daily     Psychosis symptoms  Resolved with jose raul, no current antipsychotic medication required    Insomnia  No longer using trazodone, did not find helpful  May consider low dose seroquel if no improvement with increased lithium dose    Anxiety  Metoprolol originally prescribed for tachycardia, will defer to PCP for management.    Recommended CBT for anxiety     Long term management of high risk medications  10/23/20: BMP, TSH, lithium level (0.64 on 1200 mg daily)  Ordered today: lithium level to be completed 7 days after dose increase    PharmD follow up on 12/17/20:  If tolerating lamictal, please continue dose  titration of lamictal 100 mg daily x 2 weeks, then can increase to 150 mg daily x 2 weeks, then increase to 200 mg daily     RTC: 4 weeks or sooner if needed     CRISIS NUMBERS:   Provided routinely in AVS.      Treatment Risk Statement:  The patient understands the risks, benefits, adverse effects and alternatives. Agrees to treatment with the capacity to do so. No medical contraindications to treatment. Agrees to call clinic for any problems. The patient understands to call 911 or go to the nearest ED if life threatening or urgent symptoms occur.

## 2020-12-03 ENCOUNTER — VIRTUAL VISIT (OUTPATIENT)
Dept: PSYCHIATRY | Facility: CLINIC | Age: 20
End: 2020-12-03
Attending: NURSE PRACTITIONER
Payer: COMMERCIAL

## 2020-12-03 DIAGNOSIS — Z79.899 HIGH RISK MEDICATIONS (NOT ANTICOAGULANTS) LONG-TERM USE: Primary | ICD-10-CM

## 2020-12-03 DIAGNOSIS — F31.9 BIPOLAR AFFECTIVE DISORDER, REMISSION STATUS UNSPECIFIED (H): ICD-10-CM

## 2020-12-03 PROCEDURE — 80178 ASSAY OF LITHIUM: CPT | Mod: GT | Performed by: NURSE PRACTITIONER

## 2020-12-03 PROCEDURE — 99214 OFFICE O/P EST MOD 30 MIN: CPT | Mod: 95 | Performed by: NURSE PRACTITIONER

## 2020-12-03 RX ORDER — LITHIUM CARBONATE 300 MG/1
1500 TABLET, FILM COATED, EXTENDED RELEASE ORAL DAILY
Qty: 150 TABLET | Refills: 2 | Status: SHIPPED | OUTPATIENT
Start: 2020-12-03 | End: 2021-03-12

## 2020-12-03 RX ORDER — LAMOTRIGINE 100 MG/1
100 TABLET ORAL DAILY
Qty: 30 TABLET | Refills: 2 | Status: SHIPPED | OUTPATIENT
Start: 2020-12-03 | End: 2021-01-15

## 2020-12-03 NOTE — PATIENT INSTRUCTIONS
Treatment plan today:  1. Increase lithium to 1500 mg daily.  Please complete your lithium labs 7 days after making this change.  These labs should be 10-12 hours after your nighttime lithium dose.  Please contact the clinic if you experience an increase in side effects after making this dose change.     2. Increase lamictal to 50 mg daily today    INSTRUCTIONS FOR USE OF LITHIUM      DO:    - call clinic if you, or another provider, makes any medication changes  - call clinic if your use of ibuprofen (or similar) increases at all  - call clinic if - call clinic if you experience any symptom listed below  - LABS:   obtain all labs as directed,  labs are done every 6 months on an ongoing basis;                 lithium level is drawn 5 days after starting lithium and after dose changes;                  labs also include kidney and thyroid function;                 all labs are drawn between 10 and 14 hours after your last dose (12hrs is ideal)      DO NOT:    - stop this med abruptly  - use street drugs or alcohol while being treated with lithium  - increase use of ibuprofen (or similar) without calling the clinic      TRY TO AVOID:    - any use of ibuprofen (or similar);  MAY use acetaminophen (Tylenol) for pain  - excessive sweating  - excessive salt intake  - use of over the counter herbal remedies      FOOD:  take with or without food but AVOID excessive salt intake      COMMON ADVERSE EFFECTS: acne, weight gain, nausea, tremor, increased urination, increased thirst, sedation, loose stools, blurring of memory and concentration      CALL CLINIC URGENTLY or EMERGENCY ROOM:  if you have any concerns, especially the following...  - blurred vision  - heart palpitations  - ear ringing  - excessive urination (especially at night)  OR  excessive thirst  - seizure  - kidney problems or any new medical problem  - thoughts of death or hurting yourself    - suspect Serotonin Syndrome:   confusion   tremor  severe  headache  sweats  fever   funny feeling in muscles  need to pace / restlessness   severe nausea, vomiting  diarrhea      Lab Testing:  If you had lab testing today and your results are reassuring or normal they will be mailed to you or sent through [employer] within 7 days.   If the lab tests need quick action we will call you with the results.  The phone number we will call with results is # [phone] (home) . If this is not the best number please call our clinic and change the number.    Medication Refills:  If you need any refills please call your pharmacy and they will contact us. Our fax number for refills is [phone]. Please allow three business for refill processing.   If you need to  your refill at a new pharmacy, please contact the new pharmacy directly. The new pharmacy will help you get your medications transferred.     Scheduling:  If you have any concerns about today's visit or wish to schedule another appointment please call our office during normal business hours [phone] (8-5:00 M-F)    Contact Us:  Please call [phone] during business hours (8-5:00 M-F).  If after clinic hours, or on the weekend, please call [phone].    Financial Assistance: [phone]  [phone]  [place] Billing Office, [facility]: [phone]  [phone]  Medical Records: [phone]    MENTAL HEALTH CRISIS NUMBERS:  [facility]:   [facility]: [phone]  Crisis Residence [facility]: [phone]   [facility]: [phone]   [facility]  [facility] Team: [phone] (adults) [phone] (child)     [facility]:   [facility]: [phone]   Walk-in counseling [facility]: [phone]   Walk-in counseling [facility]: [phone]   Crisis Residence [facility]: [phone]   Urgent Care Adult Mental Health: [phone] Mobile  team AND 24/7 crisis line    Other Crisis Numbers:   National Suicide Prevention Lifeline: 291-279-OZHH (010-449-8534)  CRISIS TEXT LINE: Text to 533268 for any crisis 24/7; OR www.crisistextline.org   Poison Control Center: 3-522-415-1557  CHILD: [facility] needs assessment team: [phone]   Trans Lifeline: 1-636.399.9082  [place] [facility] Lifeline: 1-885.917.6437    For a medical emergency please call 911 or go to the nearest ER.         _____________________________________________    Again thank you for choosing [facility] [place] [facility] and please let us know how we can best partner with you to improve you and your family's health.    You may be receiving a survey regarding this appointment. We would love to have your feedback, both positive and negative. The survey is done by an external company, so your answers are anonymous.

## 2020-12-16 NOTE — TELEPHONE ENCOUNTER
Writer left voice mail message for pt, identifying that I was following up on his Lamictal restart and identifying his 12/17 pharmacist appointment. Callback was requested.

## 2020-12-17 ENCOUNTER — VIRTUAL VISIT (OUTPATIENT)
Dept: PHARMACY | Facility: CLINIC | Age: 20
End: 2020-12-17
Payer: COMMERCIAL

## 2020-12-17 DIAGNOSIS — F39 MOOD DISORDER (H): Primary | ICD-10-CM

## 2020-12-17 DIAGNOSIS — R00.0 TACHYCARDIA: ICD-10-CM

## 2020-12-17 PROCEDURE — 99607 MTMS BY PHARM ADDL 15 MIN: CPT | Mod: GT | Performed by: PHARMACIST

## 2020-12-17 PROCEDURE — 99605 MTMS BY PHARM NP 15 MIN: CPT | Mod: GT | Performed by: PHARMACIST

## 2020-12-17 RX ORDER — LAMOTRIGINE 100 MG/1
TABLET ORAL
Qty: 53 TABLET | Refills: 0 | Status: SHIPPED | OUTPATIENT
Start: 2020-12-17 | End: 2021-01-28

## 2020-12-17 NOTE — PROGRESS NOTES
MTM ENCOUNTER  SUBJECTIVE/OBJECTIVE:                           Alexandru Mitchell is a 20 year old male called for an initial visit. He was referred to me from ZO Rodríguez.      Reason for visit: follow-up on Lamictal titration and lithium dose increase.    Allergies/ADRs: Reviewed in chart  Tobacco: He reports that he has quit smoking. He has quit using smokeless tobacco.  Alcohol: none  Past Medical History: Reviewed in chart      Medication Adherence/Access: no issues reported - takes medication at 7pm.     Bipolar Disorder:   Current medications:   Lithium 1500mg HS  Lamictal titration     Alexandru is a 20 year old with diagnoses of bipolar disorder who was last seen by ZO Rodríguez on 12/3/2020. At that time, lithium was increased from 1200mg to 1500mg and Lamictal was increased to 50mg daily x2 weeks then 100mg daily. He will increase lamotrigine to 100 mg today. Today, Alexandru reports improvement in depression symptoms since making the above medication changes.  He reports improved energy/fatigue, increased interest in activities, and significantly reduced negative self talk.  He denies SI.  He does report current external stressors of school finals and having several research projects/papers due next week.  He has been unable to get to the lab for a lithium level, but plans to do so this week/weekend.  He denies any new onset medication side effects.  Denies s/s of rash.       Lab Results   Component Value Date    LITHIUM 0.64 10/23/2020    LITHIUM 0.29 (L) 10/16/2020    LITHIUM 0.52 (L) 10/07/2020       Plan from 12/3 Patsy Dominique visit:   Long term management of high risk medications  10/23/20: BMP, TSH, lithium level (0.64 on 1200 mg daily)  Ordered today: lithium level to be completed 7 days after dose increase  PharmD follow up on 12/17/20:  If tolerating lamictal, please continue dose titration of lamictal 100 mg daily x 2 weeks, then can increase to 150 mg daily x 2 weeks, then increase to 200 mg daily      Tachycardia:   Current therapy: metoprolol 50mg daily. Managed by PCP.  BP Readings from Last 3 Encounters:   10/22/20 126/86   02/25/20 (!) 144/94   02/18/20 (!) 126/96     HR:   10/22/20 81  2/25/20 101  2/18/20 80      ASSESSMENT:                              Medication Adherence: No issues identified    Bipolar Disorder: Depression symptoms improving with lithium and lamotrigine dose increases. Per Patsy Dominique plan, will increase lamotrigine to 100 mg daily today x2 weeks and then increase to 150mg daily on 12/31.  Alexandru is due for lithium level and plans to obtain this in the next few days. No medication issues or side effects identified today.      Tachycardia: Stable. HR/BP WNL.     PLAN:                            1. Increase Lamictal to 100mg today then increase to 150mg daily on 12/31  2. Lithium level     I spent 35 minutes with this patient today. All changes were made via approval with ZO Rodríguez. A copy of the visit note was provided to the patient's referring provider.    Will follow up in 3-4 weeks.  He will follow-up with Dr. Young 1/26/21.     The patient was sent via [employer] a summary of these recommendations.     Eliane Sullivan, PharmD, BCPP  Medication Therapy Management Pharmacist  [facility]      Patient consented to a telehealth visit: yes  Telemedicine Visit Details  Type of service:  Telephone visit  Start Time: 2:25 PM  End Time: 2:59 PM  Originating Location (patient location): [place]  Distant Location (provider location):  [facility]  Mode of Communication:  Telephone      Medication Therapy Recommendations  Mood disorder (H)    Current Medication: lamoTRIgine (LAMICTAL) 100 MG tablet   Rationale: Dose too low - Dosage too low - Effectiveness   Recommendation: Increase Dose - lamoTRIgine 100 MG tablet - 100mg daily x2 weeks then 150mg daily   Status: Accepted per Provider

## 2020-12-17 NOTE — TELEPHONE ENCOUNTER
"Writer received call from pt, who stated that regarding things, \"I think they've gone pretty good\" and that he is feeling less constant symptoms of depression.    Pt identified stressors of finals week (which ends 12/23 for him), which last led to him feeling very flustered all the time and that he had stopped doing anything that's not school-related. He identified having an unproductive amount of hesitation, feeling on-edge all of the time, and not entirely in control of himself, but feeling better now that one 20-minutes Finnish language presentation is done.    While preparing for his presentation his sleep schedule was altered so that he would work at night and sleep during day. He hopes to start getting schedule realigned tonight. He has felt need to sleep during this time, indicating that mood elevation is not a concern.  Pt reported that he feels that he has energy to do what he needs to do, which is new for him and is not as easily fatigued by doing small tasks.    Plans to get lithium lab done tomorrow.                  "

## 2021-01-05 DIAGNOSIS — Z79.899 HIGH RISK MEDICATIONS (NOT ANTICOAGULANTS) LONG-TERM USE: ICD-10-CM

## 2021-01-05 LAB — LITHIUM SERPL-SCNC: 0.65 MMOL/L (ref 0.6–1.2)

## 2021-01-05 PROCEDURE — 80178 ASSAY OF LITHIUM: CPT | Performed by: NURSE PRACTITIONER

## 2021-01-05 PROCEDURE — 36415 COLL VENOUS BLD VENIPUNCTURE: CPT | Performed by: NURSE PRACTITIONER

## 2021-01-15 ENCOUNTER — VIRTUAL VISIT (OUTPATIENT)
Dept: PHARMACY | Facility: CLINIC | Age: 21
End: 2021-01-15
Payer: COMMERCIAL

## 2021-01-15 DIAGNOSIS — F39 MOOD DISORDER (H): Primary | ICD-10-CM

## 2021-01-15 PROCEDURE — 99607 MTMS BY PHARM ADDL 15 MIN: CPT | Mod: GT | Performed by: PHARMACIST

## 2021-01-15 PROCEDURE — 99605 MTMS BY PHARM NP 15 MIN: CPT | Mod: GT | Performed by: PHARMACIST

## 2021-01-15 NOTE — PROGRESS NOTES
Medication Therapy Management (MTM) Encounter    ASSESSMENT:                            Medication Adherence/Access: No issues identified    Bipolar Disorder: Recommend increasing lamotrigine to 200mg at bedtime today. Pt has been taking 150mg HS for 2 weeks and was previously stabilized on 200mg HS in the past. This change was approved by Patsy Dominique prior to her leave. Reviewed lithium levels with pt. Most likely cause for discrepancy between levels was changing from BID to once daily dosing and possibly taking AM lithium dose prior to 10/23/20 level. Current lithium level is WNL, although on low end of normal. No changes to lithium dose at this time as depression symptoms are improving with lamotrigine dose increase and no concerning s/s of jose raul. Pt to follow-up with Dr. Young 1/26/21.      PLAN:                            - Increase lamotrigine to 200mg at bedtime - this change was previously approved by Patsy Dominique.   - Continue lithium 1500mg at bedtime  - Visit with Dr. Young 1/26/21     SUBJECTIVE/OBJECTIVE:                          Alexandru Mitchell is a 20 year old male called for a follow-up visit. He was referred to me from ZO Rodríguez.  Today's visit is a follow-up MTM visit from 12/17/2020. This is an initial visit for 2021.     Reason for visit: wonders about his lamotrigine dose and lithium level.    Allergies/ADRs: None  Tobacco: He reports that he has quit smoking. He has quit using smokeless tobacco.  Alcohol: none  Past Medical History: Reviewed in chart      Medication Adherence/Access: no issues reported     Bipolar Disorder:   Current medications:   Lithium 1500mg HS (increased from 1200mg HS 12/3/2020)  Lamictal 150mg HS (increased from 100mg 12/31/2020)    Alexandru is a 20 year old with diagnoses of bipolar disorder who was last seen in clinic by ZO Rodríguez on 12/3/2020 and by writer on 12/17/20.  The above medication changes were made to target depression symptoms of low/irritable mood,  anhedonia, negative thoughts about himself, poor concentration, insomnia.    Today, Alexandru reports improvement in depression symptoms since making the above medication changes and with the completion of school finals.  He reports improved mood, energy and cognition, increased interest in activities, and significantly reduced negative self talk.  He is still having some trouble falling asleep and not feeling fully rested during the day. He also endorses some irritability.  He denies any medication side effects.  He was able to get a lithium level and wonders why it is only a slight increase from his October level with the dose change of 300 mg daily.  Appears lithium dose was 600mg BID at the time of 10/23 level.    Lab Results   Component Value Date    LITHIUM 0.65 01/05/2021    LITHIUM 0.64 10/23/2020    LITHIUM 0.29 (L) 10/16/2020       ----------------    I spent 26 minutes with this patient today. A copy of the visit note was provided to the patient's referring provider.    The patient was sent via [employer] a summary of these recommendations.     Eliane Sullivan, PharmD, BCPP  Medication Therapy Management Pharmacist  [facility]    Telemedicine Visit Details  Type of service:  Telephone visit  Start Time: 11:10 AM  End Time: 11:36 AM  Originating Location (patient location): Home  Distant Location (provider location):  [facility]        Medication Therapy Recommendations  Mood disorder (H)    Current Medication: lamoTRIgine (LAMICTAL) 100 MG tablet   Rationale: Does not understand instructions - Adherence - Adherence   Recommendation: Provide Education - lamoTRIgine 100 MG tablet - Take 2 tablets at bedtime   Status: Patient Agreed - Adherence/Education

## 2021-01-19 ENCOUNTER — TELEPHONE (OUTPATIENT)
Dept: PHARMACY | Facility: CLINIC | Age: 21
End: 2021-01-19

## 2021-01-26 ENCOUNTER — TELEPHONE (OUTPATIENT)
Dept: PSYCHIATRY | Facility: CLINIC | Age: 21
End: 2021-01-26

## 2021-01-26 ENCOUNTER — VIRTUAL VISIT (OUTPATIENT)
Dept: PSYCHIATRY | Facility: CLINIC | Age: 21
End: 2021-01-26
Attending: PSYCHIATRY & NEUROLOGY
Payer: COMMERCIAL

## 2021-01-26 DIAGNOSIS — F31.9 BIPOLAR AFFECTIVE DISORDER, REMISSION STATUS UNSPECIFIED (H): Primary | ICD-10-CM

## 2021-01-26 DIAGNOSIS — Z51.81 ENCOUNTER FOR THERAPEUTIC DRUG MONITORING: ICD-10-CM

## 2021-01-26 DIAGNOSIS — F41.1 GENERALIZED ANXIETY DISORDER: ICD-10-CM

## 2021-01-26 DIAGNOSIS — F31.9 BIPOLAR I DISORDER (H): Primary | ICD-10-CM

## 2021-01-26 PROCEDURE — 99214 OFFICE O/P EST MOD 30 MIN: CPT | Mod: 95 | Performed by: PSYCHIATRY & NEUROLOGY

## 2021-01-26 NOTE — PATIENT INSTRUCTIONS
**For crisis resources, please see the information at the end of this document**     Patient Education      Continue lithium and lamotrigine at the current doses    Please get a blood test    Please make a follow-up appointment with Patsy oDminique for mid March    Thank you for coming to the [facility] [place] [facility].    Lab Testing:  If you had lab testing today and your results are reassuring or normal they will be mailed to you or sent through [employer] within 7 days. If the lab tests need quick action we will call you with the results. The phone number we will call with results is # [phone] (home) . If this is not the best number please call our clinic and change the number.    Medication Refills:  If you need any refills please call your pharmacy and they will contact us. Our fax number for refills is [phone]. Please allow three business for refill processing. If you need to  your refill at a new pharmacy, please contact the new pharmacy directly. The new pharmacy will help you get your medications transferred.     Scheduling:  If you have any concerns about today's visit or wish to schedule another appointment please call our office during normal business hours [phone] (8-5:00 M-F)    Contact Us:  Please call [phone] during business hours (8-5:00 M-F).  If after clinic hours, or on the weekend, please call  [phone].    Financial Assistance [phone]  [facility] Billing [phone]  Central Billing Office, ealth: [phone]  [place] Billing [phone]  Medical Records [phone]  [place] Patient Bill of Rights [email or web address]       MENTAL HEALTH CRISIS NUMBERS:  For a medical emergency please call  911 or go to the nearest ER.     [facility]:   [facility] -[phone]   Crisis Residence [facility] -[phone]    Walk-In Counseling Center [facility] -[phone]   COPE 24/7 Lon Mobile Team -[phone] (adults)/[phone] (child)  CHILD: [facility] needs assessment team - [phone]      [facility]:   [facility] - [phone]   Walk-in counseling [facility] - [phone]   Walk-in counseling [facility] Family [facility] - [phone]   Crisis Residence [facility] Residence - [phone]  Urgent Care Adult Mental Qqxqlv-[phone] mobile unit/ 24/7 crisis line    National Crisis Numbers:   National Suicide Prevention Lifeline: 5-618-270-TALK (154-805-8695)  Poison Control Center - 1-817.575.4146  [employer]/resources for a list of additional resources (SOS)  Trans Lifeline a hotline for transgender people 1-859.169.9940  The [employer] Project a hotline for LGBT youth 1-699.177.3987  Crisis Text Line: For any crisis 24/7   To: 835564  see www.crisistextline.org  - IF MAKING A CALL FEELS TOO HARD, send a text!         Again thank you for choosing [facility]DI[facility] and please let us know how we can best partner with you to improve you and your family's health.    You may be receiving a survey regarding this appointment. We would love to have your feedback, both positive and negative. The survey is done by an external company, so your answers are anonymous.

## 2021-01-26 NOTE — PROGRESS NOTES
"VIDEO VISIT  Alexandru Mitchell is a 20 year old patient who is being evaluated via a billable video visit.      The patient has been notified of following:   \"We have found that certain health care needs can be provided without the need for an in-person physical exam. This service lets us provide the care you need with a video conversation. If a prescription is necessary we can send it directly to your pharmacy. If lab work is needed we can place an order for that and you can then stop by our lab to have the test done at a later time. Insurers are generally covering virtual visits as they would in-office visits so billing should not be different than normal.  If for some reason you do get billed incorrectly, you should contact the billing office to correct it and that number is in the AVS .    Patient has given verbal consent for video visit?: Yes   How would you like to obtain your AVS?: [employer]  AVS SmartPhrase [PsychAVS] has been placed in 'Patient Instructions': Yes      Video- Visit Details  Type of service:  video visit for medication management  Time of service:    Date:  01/26/2021    Video Start Time:  4:04 PM        Video End Time:  4:30 PM    Reason for video visit:  Patient unable to travel due to Covid-19/services only offered via telehealth  Originating Site (patient location):  [facility]   Location- Friend or family home  Distant Site (provider location):  Remote location  Mode of Communication:  Video Conference via [employer]  Consent:  Patient has given verbal consent for video visit?: Yes       Psychiatry Clinic Follow Up Visit (telehealth)                                    Alexandru Mitchell is a 20 year old male who prefers the name Alexandru and pronoun he, him, his.  Therapist: None   PCP: Jerrica Billingsley     Chief Complaint                                                                                                        \" Depression, cognitive symptoms\"      History of Present Illness                      "                                                           4, 4     Alexandru is a patient with bipolar 1 disorder and RACHEL who typically follows with Patsy Dominique.    Since the last visit, Alexandru has been taking lithium 1500 mg at bedtime and has titrated lamotrigine to 200 mg at bedtime.  Lithium was increased from 1200 mg, and lamotrigine restarted to target symptoms of depression.    Today, Alexandru reports that his depressive symptoms are much improved.  His mood is good most of the time.  He feels reasonably positive.  He is back in school doing 4 courses at [employer] in history, Monegasque, and Religious studies.  He is enjoying school.  He is pleased with his current medications and feels they are contributing to his improvement.    Alexandru has made 2 main concerns today.  The first is about polyuria, particularly in the evening.  It delays his sleep onset, but he is typically still able to get 8 hours of sleep per night.  We discussed this is a common side effect of lithium and if it is not distressing or impairing, it does not require specific treatment.  Alexandru understands and does not wish to do anything to target polyuria at this point.    Alexandru's other concern is about daytime fatigue.  He feels both sleepy and physically tired.  It typically starts around noon and he can nap for several hours if he lets himself.  He is unclear about the cause of this.  It is not obviously medication related.    I note from Alexandru's blood work that his lithium level increased from 0.64 to only 0.65 with a 300 mg dose increase.  Alexandru is agreeable to get another blood test to see where his lithium level is at now, given the rather puzzling result of the previous blood test.  We will also check for causes of fatigue, such as anemia, hypercalcemia, elevated blood glucose, and thyroid abnormalities.    Alexandru is agreeable to continue his medications at the current doses and to get the blood test done.  He will make a follow-up appointment with Patsy  "Catina for mid March.  I will notify him if there are any concerns with the results of his blood test, which she will get done tomorrow.    Current psychiatric medications  Lithium 1500 mg at bedtime   Lamictal 200 mg daily  Metroprolol 50 mg daily (managed by PCP)    Psychiatric Review of Symptoms:   Depression:  see HPI  Elevated:  none currently   Psychosis:  none  Anxiety:  See HPI       Psychiatric History (no change today)     Suicide Attempt:   #- 3 by overdose on OTC medications; no medical attention sought.  Most Recent- 16 year old  SIB- cutting and scratching   Mary Beth- first episode- possibly as young as 12 years old characterized by hypomania.  Most recent manic episode in 10/2019.    Psychosis- AH, paranoia  Only during manic episodes.      Social History  FINANCIAL SUPPORT- UNKNOWN       CHILDREN- None       EARLY HISTORY/ EDUCATION- Currently student at [employer] studying [employer] MH hx - maternal grandfather and great uncle with bipolar disorder.  Maternal great uncle  by suicide        Psychiatric Medication Trials (no change today)     From [facility] note from Eliane Sullivan:      - Propranolol by PCP for anxiety - not helpful     -2017, Prozac (dose unknown) mainly for anxiety. Pt reports little to no symptom reduction with Prozac. Does recall increased suicidality before and during Prozac (compared to now), but does not think Prozac worsened suicidality or irritability.       2017 established with psychiatrist, Ruben Fuentes in [place]:  During 2017 was started on:   - venlafaxine, possibly was up to 225mg daily, but decreased to 150mg after ~6 months d/t increased irritability and \"feeling numb, no emotion\".  Denies worsening SI, unsure if mood cycling worsened.   - lamotrigine, 200mg is highest dose. Started for \"mood cycling\", psychiatrist didn't diagnose him with bipolar.    - perphenazine-amitriptyline 2-10mg. Started possibly for sleep/anxiety.  Pt does endorse " hallucinations during periods of depression and jose raul, denies hallucinations when euthymic.     5204-9000 amitriptyline (without perphenazine)(dose unknown) - discontinued d/t urinary retention    2018- metoprolol started for tachycardia     2018 - nortriptyline started by neuro for migraine ppx.                                  Medical / Surgical History     Patient Active Problem List   Diagnosis     Generalized anxiety disorder     Social anxiety disorder     Migraine with aura, not intractable, without status migrainosus       Past Surgical History:   Procedure Laterality Date     HEAD & NECK SURGERY       TONSILLECTOMY           Medical Review of Systems                                                                                                    2, 10     Completed ROS is documented in HPI     Allergy   Patient has no known allergies.   Current Medications     Current Outpatient Medications   Medication Sig Dispense Refill     lamoTRIgine (LAMICTAL) 100 MG tablet Starting 12/31/20: take 1.5 tablets (150mg) daily x2 weeks then increase to 2 tablets (200mg) daily. 53 tablet 0     lithium ER (LITHOBID) 300 MG CR tablet Take 5 tablets (1,500 mg) by mouth daily 150 tablet 2     metoprolol succinate ER (TOPROL-XL) 50 MG 24 hr tablet Take 1 tablet (50 mg) by mouth daily 90 tablet 3      Vitals                                                                                                                        3, 3     There were no vitals taken for this visit.        Mental Status Exam                                                                                   9, 14 cog gs     Alertness: alert  and oriented  Appearance: casually groomed  Behavior/Demeanor: cooperative, pleasant  Speech: regular rate and rhythm  Language: intact  Psychomotor: normal  Mood: depressed  Affect: subdued  Thought Process/Associations: unremarkable  Thought Content:  Reports none;  Denies suicidal ideation, violent ideation and  delusions  Perception:  Reports none;  Denies auditory hallucinations and visual hallucinations  Insight: adequate  Judgment: adequate for safety  Cognition: (6) attention span: intact  concentration: intact  recent memory: intact  remote memory: intact  fund of knowledge: appropriate  Gait and Station: normal      Labs and Data       PHQ9 Today: not completed   PHQ-9 SCORE 12/9/2019   PHQ-9 Total Score 15       Plan                                                                            m2, h3     Continue lithium and lamotrigine at the current doses    Get a blood test to check lithium level, and rule out anemia, hypercalcemia thyroid conditions, elevated blood glucose as a cause of fatigue    RTC: 6 to 8 weeks with Patsy Dominique    CRISIS NUMBERS:   Provided routinely in AVS.      Treatment Risk Statement:  The patient understands the risks, benefits, adverse effects and alternatives. Agrees to treatment with the capacity to do so. No medical contraindications to treatment. Agrees to call clinic for any problems. The patient understands to call 911 or go to the nearest ED if life threatening or urgent symptoms occur.

## 2021-01-26 NOTE — TELEPHONE ENCOUNTER
On January 26, 2021, at 2:04 PM, writer called patient at [phone] to confirm Virtual Visit. Writer unable to make contact with patient. Writer left detailed voice message for call back. [phone] left as call back number. Rosetta Toussaint, [facility]

## 2021-01-26 NOTE — TELEPHONE ENCOUNTER
----- Message from Dewey Young MD sent at 1/26/2021  4:28 PM CST -----  Kin Lord,Can you order labs for Alexandru?  I am following him while Patsy Dominique is away.  He gets his labs in the [facility] system.He needs a CBC and differential, BMP, TSH, lithium level, and blood glucose.Thanks,    DB        Writer placed requested orders.

## 2021-01-27 DIAGNOSIS — F31.9 BIPOLAR AFFECTIVE DISORDER, REMISSION STATUS UNSPECIFIED (H): ICD-10-CM

## 2021-01-27 DIAGNOSIS — Z51.81 ENCOUNTER FOR THERAPEUTIC DRUG MONITORING: ICD-10-CM

## 2021-01-27 LAB
ANION GAP SERPL CALCULATED.3IONS-SCNC: 5 MMOL/L (ref 3–14)
BASOPHILS # BLD AUTO: 0.1 10E9/L (ref 0–0.2)
BASOPHILS NFR BLD AUTO: 0.9 %
BUN SERPL-MCNC: 14 MG/DL (ref 7–30)
CALCIUM SERPL-MCNC: 9.1 MG/DL (ref 8.5–10.1)
CHLORIDE SERPL-SCNC: 111 MMOL/L (ref 94–109)
CO2 SERPL-SCNC: 24 MMOL/L (ref 20–32)
CREAT SERPL-MCNC: 0.88 MG/DL (ref 0.66–1.25)
DIFFERENTIAL METHOD BLD: NORMAL
EOSINOPHIL # BLD AUTO: 0.3 10E9/L (ref 0–0.7)
EOSINOPHIL NFR BLD AUTO: 3.6 %
ERYTHROCYTE [DISTWIDTH] IN BLOOD BY AUTOMATED COUNT: 12.5 % (ref 10–15)
GFR SERPL CREATININE-BSD FRML MDRD: >90 ML/MIN/{1.73_M2}
GLUCOSE SERPL-MCNC: 103 MG/DL (ref 70–99)
HCT VFR BLD AUTO: 49 % (ref 40–53)
HGB BLD-MCNC: 16.4 G/DL (ref 13.3–17.7)
IMM GRANULOCYTES # BLD: 0 10E9/L (ref 0–0.4)
IMM GRANULOCYTES NFR BLD: 0.2 %
LITHIUM SERPL-SCNC: 0.65 MMOL/L (ref 0.6–1.2)
LYMPHOCYTES # BLD AUTO: 2.3 10E9/L (ref 0.8–5.3)
LYMPHOCYTES NFR BLD AUTO: 26.7 %
MCH RBC QN AUTO: 28.7 PG (ref 26.5–33)
MCHC RBC AUTO-ENTMCNC: 33.5 G/DL (ref 31.5–36.5)
MCV RBC AUTO: 86 FL (ref 78–100)
MONOCYTES # BLD AUTO: 0.6 10E9/L (ref 0–1.3)
MONOCYTES NFR BLD AUTO: 7.2 %
NEUTROPHILS # BLD AUTO: 5.3 10E9/L (ref 1.6–8.3)
NEUTROPHILS NFR BLD AUTO: 61.4 %
NRBC # BLD AUTO: 0 10*3/UL
NRBC BLD AUTO-RTO: 0 /100
PLATELET # BLD AUTO: 330 10E9/L (ref 150–450)
POTASSIUM SERPL-SCNC: 4.1 MMOL/L (ref 3.4–5.3)
RBC # BLD AUTO: 5.72 10E12/L (ref 4.4–5.9)
SODIUM SERPL-SCNC: 140 MMOL/L (ref 133–144)
TSH SERPL DL<=0.005 MIU/L-ACNC: 1.32 MU/L (ref 0.4–4)
WBC # BLD AUTO: 8.6 10E9/L (ref 4–11)

## 2021-01-27 PROCEDURE — 84443 ASSAY THYROID STIM HORMONE: CPT | Performed by: PSYCHIATRY & NEUROLOGY

## 2021-01-27 PROCEDURE — 80048 BASIC METABOLIC PNL TOTAL CA: CPT | Performed by: PSYCHIATRY & NEUROLOGY

## 2021-01-27 PROCEDURE — 36415 COLL VENOUS BLD VENIPUNCTURE: CPT | Performed by: PSYCHIATRY & NEUROLOGY

## 2021-01-27 PROCEDURE — 85025 COMPLETE CBC W/AUTO DIFF WBC: CPT | Performed by: PSYCHIATRY & NEUROLOGY

## 2021-01-27 PROCEDURE — 80178 ASSAY OF LITHIUM: CPT | Performed by: PSYCHIATRY & NEUROLOGY

## 2021-03-10 DIAGNOSIS — F31.9 BIPOLAR AFFECTIVE DISORDER, REMISSION STATUS UNSPECIFIED (H): ICD-10-CM

## 2021-03-12 RX ORDER — LITHIUM CARBONATE 300 MG/1
1500 TABLET, FILM COATED, EXTENDED RELEASE ORAL DAILY
Qty: 150 TABLET | Refills: 0 | Status: SHIPPED | OUTPATIENT
Start: 2021-03-12 | End: 2021-03-30

## 2021-03-12 NOTE — TELEPHONE ENCOUNTER
Medication requested: LITHIUM CARBONATE ER 300MG TBCR  Last refilled: 2/8/21  Qty: 150      Last seen: 1/26/21  RTC: 6-8 WEEKS  Cancel: 0  No-show: 0  Next appt: 3/30/21    Refill decision:   Refilled for 30 days per protocol.

## 2021-03-23 DIAGNOSIS — F39 MOOD DISORDER (H): ICD-10-CM

## 2021-03-24 RX ORDER — LAMOTRIGINE 100 MG/1
200 TABLET ORAL DAILY
Qty: 60 TABLET | Refills: 0 | Status: SHIPPED | OUTPATIENT
Start: 2021-03-24 | End: 2021-03-30

## 2021-03-24 NOTE — TELEPHONE ENCOUNTER
Medication requested: : LAMOTRIGINE 100MG TABS   Last refilled: 2/22/2021  Qty: 60/0      Last seen: 1/26/2021  RTC: 6-8 weeks  Cancel: 0  No-show: 0  Next appt: 3/30/2021    Refill decision:   Refilled for 30 days per protocol.

## 2021-03-30 ENCOUNTER — VIRTUAL VISIT (OUTPATIENT)
Dept: PSYCHIATRY | Facility: CLINIC | Age: 21
End: 2021-03-30
Attending: NURSE PRACTITIONER
Payer: COMMERCIAL

## 2021-03-30 DIAGNOSIS — F31.9 BIPOLAR AFFECTIVE DISORDER, REMISSION STATUS UNSPECIFIED (H): Primary | ICD-10-CM

## 2021-03-30 DIAGNOSIS — Z79.899 HIGH RISK MEDICATIONS (NOT ANTICOAGULANTS) LONG-TERM USE: ICD-10-CM

## 2021-03-30 DIAGNOSIS — F39 MOOD DISORDER (H): ICD-10-CM

## 2021-03-30 PROCEDURE — 99214 OFFICE O/P EST MOD 30 MIN: CPT | Mod: 95 | Performed by: NURSE PRACTITIONER

## 2021-03-30 RX ORDER — LITHIUM CARBONATE 450 MG
1800 TABLET, EXTENDED RELEASE ORAL DAILY
Qty: 360 TABLET | Refills: 1 | Status: SHIPPED | OUTPATIENT
Start: 2021-03-30 | End: 2021-04-22

## 2021-03-30 RX ORDER — LAMOTRIGINE 100 MG/1
200 TABLET ORAL DAILY
Qty: 180 TABLET | Refills: 1 | Status: SHIPPED | OUTPATIENT
Start: 2021-03-30 | End: 2021-04-22

## 2021-03-30 NOTE — PATIENT INSTRUCTIONS
Nice to see you today Alexandru.  Please increase lithium to 1800 mg daily and complete your lithium level 5-7 days following this dose increase.  Please remember that lithium labs should be completed 10-12 hours after your last lithium dose    INSTRUCTIONS FOR USE OF LITHIUM      DO:    - call clinic if you, or another provider, makes any medication changes  - call clinic if your use of ibuprofen (or similar) increases at all  - call clinic if - call clinic if you experience any symptom listed below  - LABS:   obtain all labs as directed,  labs are done every 6 months on an ongoing basis;                 lithium level is drawn 5 days after starting lithium and after dose changes;                  labs also include kidney and thyroid function;                 all labs are drawn between 10 and 14 hours after your last dose (12hrs is ideal)      DO NOT:    - stop this med abruptly  - use street drugs or alcohol while being treated with lithium  - increase use of ibuprofen (or similar) without calling the clinic      TRY TO AVOID:    - any use of ibuprofen (or similar);  MAY use acetaminophen (Tylenol) for pain  - excessive sweating  - excessive salt intake  - use of over the counter herbal remedies      FOOD:  take with or without food but AVOID excessive salt intake      COMMON ADVERSE EFFECTS: acne, weight gain, nausea, tremor, increased urination, increased thirst, sedation, loose stools, blurring of memory and concentration      CALL CLINIC URGENTLY or EMERGENCY ROOM:  if you have any concerns, especially the following...  - blurred vision  - heart palpitations  - ear ringing  - excessive urination (especially at night)  OR  excessive thirst  - seizure  - kidney problems or any new medical problem  - thoughts of death or hurting yourself    - suspect Serotonin Syndrome:   confusion   tremor  severe headache  sweats  fever   funny feeling in muscles  need to pace / restlessness   severe nausea,  vomiting  diarrhea      Thank you for coming to the [facility] [place] [facility].    Lab Testing:  If you had lab testing today and your results are reassuring or normal they will be mailed to you or sent through vitalclip within 7 days.   If the lab tests need quick action we will call you with the results.  The phone number we will call with results is # [phone] (home) . If this is not the best number please call our clinic and change the number.    Medication Refills:  If you need any refills please call your pharmacy and they will contact us. Our fax number for refills is [phone]. Please allow three business for refill processing.   If you need to  your refill at a new pharmacy, please contact the new pharmacy directly. The new pharmacy will help you get your medications transferred.     Scheduling:  If you have any concerns about today's visit or wish to schedule another appointment please call our office during normal business hours [phone] (8-5:00 M-F)    Contact Us:  Please call [phone] during business hours (8-5:00 M-F).  If after clinic hours, or on the weekend, please call [phone].    Financial Assistance: [phone]  [phone]  Central Billing Office, [facility]: [phone]  [phone]  Medical Records: [phone]    MENTAL HEALTH CRISIS NUMBERS:  [facility]:   [facility]: [phone]  Crisis Residence \[facility] [place] [facility]\"" [facility]: [phone]   [facility]: [phone]   COPE  [place] Mobile Team: [phone] (adults) [phone] (child)     [facility]:   [facility]: [phone]   Walk-in counseling [facility]: [phone]   Walk-in counseling [facility]: [phone]   Crisis Residence [facility] Residence: [phone]   Urgent Care Adult Mental Health: [phone] [facility]  AND 24/7 crisis line    Other Crisis Numbers:   National Suicide Prevention Lifeline: 152-353-NSFN (264-369-4977)  CRISIS TEXT LINE: Text to 823564 for any crisis 24/7; OR www.crisistextline.org   Poison Control Center: 8-635-594-5814  CHILD: [facility] needs assessment team: [phone]   Trans Lifeline: 1-643.137.4765  [facility] Lifeline: 1-832.911.3115    For a medical emergency please call 911 or go to the nearest ER.         _____________________________________________    Again thank you for choosing [facility] [place] [facility] and please let us know how we can best partner with you to improve you and your family's health.    You may be receiving a survey regarding this appointment. We would love to have your feedback, both positive and negative. The survey is done by an external company, so your answers are anonymous.

## 2021-03-30 NOTE — PROGRESS NOTES
"VIDEO VISIT  Alexandru Mitchell is a 21 year old patient who is being evaluated via a billable video visit.      The patient has been notified of following:   \"We have found that certain health care needs can be provided without the need for an in-person physical exam. This service lets us provide the care you need with a video conversation. If a prescription is necessary we can send it directly to your pharmacy. If lab work is needed we can place an order for that and you can then stop by our lab to have the test done at a later time. Insurers are generally covering virtual visits as they would in-office visits so billing should not be different than normal.  If for some reason you do get billed incorrectly, you should contact the billing office to correct it and that number is in the AVS .    Patient has given verbal consent for video visit?: Yes   How would you like to obtain your AVS?: [employer]  AVS SmartPhrase [PsychAVS] has been placed in 'Patient Instructions': Yes      Video- Visit Details  Type of service:  video visit for medication management  Time of service:    Date:  03/30/2021    Video Start Time:  9:04 AM        Video End Time:  9:33 AM    Reason for video visit:  Patient unable to travel due to Covid-19/services only offered via telehealth  Originating Site (patient location):  [facility]   Location- Patient's home  Distant Site (provider location):  Remote location  Mode of Communication:  Video Conference via Doxy[employer]me  Consent:  Patient has given verbal consent for video visit?: Yes       Psychiatry Clinic Follow Up Visit (telehealth)                                    Alexandru Mitchell is a 21 year old male who prefers the name Alexandru and pronoun he, him, his.  Therapist: None   PCP: Jerrica Billingsley      History of Present Illness                                                                                4, 4     -Alexandru is a 21-year-old male with bipolar 1 disorder, RACHEL and panic attacks.  He was last seen " in this clinic by Dr. Young on 1/26/21 at which time no medication changes were made.  An additional lithium lab was ordered due to negligable increase of lithium level after increasing lithium dose by 300 mg daily.  Lab was repeated on 1/27/21 with no further increase in blood level of lithium (0.65).  This lab was drawn in recommended window and he reports good medication adherence.   -He reports today that he has been feeling overwhelmed due to midterms. Denies significant mood episodes including persistent depression or jose raul/hypomania. However does continue to have anxiety and a tendency to feel overwhelmed, which can effect his mood state.  Has been having some difficulty falling asleep at night due to anxiety, finds it difficult to relax enough to sleep and will take up to 2 hours to fall asleep.  Sleep is variable, but typically 5-6 hours at night and some napping during the day.  He attributes poor sleep in part to time management with studying.    -Denies SI, intent or plan. Denies death ideation.   -Is working with a therapist in our clinic.  Has previously worked with [employer] and is receiving some accommodations there. He will consider meeting with our Supp[employer] Education and .        -No recent substance use.  Has been trying to cut back on nicotine, minimal success, not interested in smoking cessation aides today. Denies any symptoms of psychosis.        Medication SE/ROS:  Poor concentration/cognitive dulling, poor energy level, increased thirst and increased urination (increased urination continues but improved overall).      Current psychiatric medications  Lithium 1500 mg at bedtime   Lamictal 200 mg daily  Metroprolol 50 mg daily (managed by PCP)    Psychiatric Review of Symptoms:   Depression:  see HPI  Elevated:  none currently   Psychosis:  none  Anxiety:  See HPI        Psychiatric History (no change today)     Suicide Attempt:   #- 3 by overdose on OTC  "medications; no medical attention sought.  Most Recent- 16 year old  SIB- cutting and scratching   Mary Beth- first episode- possibly as young as 12 years old characterized by hypomania.  Most recent manic episode in 10/2019.    Psychosis- AH, paranoia  Only during manic episodes.      Social History  FINANCIAL SUPPORT- UNKNOWN       CHILDREN- None       EARLY HISTORY/ EDUCATION- Currently student at [employer] studying [employer]    Family MH hx - maternal grandfather and great uncle with bipolar disorder.  Maternal great uncle  by suicide        Psychiatric Medication Trials (no change today)     From [facility] note from Eliane Sullivan:      - Propranolol by PCP for anxiety - not helpful     -2017, Prozac (dose unknown) mainly for anxiety. Pt reports little to no symptom reduction with Prozac. Does recall increased suicidality before and during Prozac (compared to now), but does not think Prozac worsened suicidality or irritability.       2017 established with psychiatrist, Ruben Fuentes in [place]:  During 2017 was started on:   - venlafaxine, possibly was up to 225mg daily, but decreased to 150mg after ~6 months d/t increased irritability and \"feeling numb, no emotion\".  Denies worsening SI, unsure if mood cycling worsened.   - lamotrigine, 200mg is highest dose. Started for \"mood cycling\", psychiatrist didn't diagnose him with bipolar.    - perphenazine-amitriptyline 2-10mg. Started possibly for sleep/anxiety.  Pt does endorse hallucinations during periods of depression and mary beth, denies hallucinations when euthymic.     6897-2834 amitriptyline (without perphenazine)(dose unknown) - discontinued d/t urinary retention    - metoprolol started for tachycardia      - nortriptyline started by neuro for migraine ppx.                                  Medical / Surgical History     Patient Active Problem List   Diagnosis     Generalized anxiety disorder     Social anxiety disorder     Migraine with aura, " not intractable, without status migrainosus       Past Surgical History:   Procedure Laterality Date     HEAD & NECK SURGERY       TONSILLECTOMY           Medical Review of Systems                                                                                                    2, 10     Completed ROS is documented in HPI     Allergy   Patient has no known allergies.   Current Medications     Current Outpatient Medications   Medication Sig Dispense Refill     lamoTRIgine (LAMICTAL) 100 MG tablet TAKE 2 TABLETS (200 MG) BY MOUTH DAILY 60 tablet 0     lithium ER (LITHOBID) 300 MG CR tablet Take 5 tablets (1,500 mg) by mouth daily 150 tablet 0     metoprolol succinate ER (TOPROL-XL) 50 MG 24 hr tablet Take 1 tablet (50 mg) by mouth daily 90 tablet 3      Vitals                                                                                                                        3, 3     There were no vitals taken for this visit.        Mental Status Exam                                                                                   9, 14 cog gs     Alertness: alert  and oriented  Appearance: casually groomed  Behavior/Demeanor: cooperative, pleasant  Speech: regular rate and rhythm  Language: intact  Psychomotor: normal  Mood: euthymic to anxious  Affect: euthymic  Thought Process/Associations: unremarkable  Thought Content:  Reports none;  Denies suicidal ideation, violent ideation and delusions  Perception:  Reports none;  Denies auditory hallucinations and visual hallucinations  Insight: adequate  Judgment: adequate for safety  Cognition: (6) attention span: intact  concentration: intact  recent memory: intact  remote memory: intact  fund of knowledge: appropriate  Gait and Station: normal      Labs and Data       PHQ9 Today: not completed   PHQ-9 SCORE 12/9/2019   PHQ-9 Total Score 15       Diagnosis, Assessment & Plan                                                                            m2, h3     DSM-5  diagnoses:    -Bipolar 1 Disorder   -Generalized Anxiety Disorder with panic attacks, r/o panic disorder  -Nicotine use disorder    Alexandru Mitchell is a 21-year-old with a history of the diagnoses listed above.  In addition, he reports traumatic loss of family member to suicide at age 11, which precipitated worsening of MH symptoms, and symptoms of a trauma/stressor related disorder should be further assessed.  He has a genetic loading for bipolar disorder (maternal grandfather and great uncle).      Today, Alexandru reports depression symptoms have improved, however continues to experience emotional overwhelm, anxiety and insomnia which impact mood state.  Lithium level could be optimized to target these symptoms and he would like to pursue a dose increase today.  He denies SI, intent or plan and risk of harm to self or others is low.      Bipolar disorder  Increase lithium to 1800 mg at bedtime  Continue lamictal 200 mg daily     Psychosis symptoms  Resolved with jose raul, no current antipsychotic medication required    Insomnia   No longer using trazodone, did not find helpful  May consider low dose seroquel if no improvement with increased lithium dose    Anxiety  Metoprolol originally prescribed for tachycardia, will defer to PCP for management.    Recommended CBT for anxiety     Nicotine use disorder  Declines NRT today    Long term management of high risk medications  1/27/21: BMP, TSH, lithium level (0.65 on 1500 mg daily)  Ordered today: lithium level to be completed 5-7 days after dose increase       RTC: 2-4 weeks or sooner if needed     CRISIS NUMBERS:   Provided routinely in AVS.      Treatment Risk Statement:  The patient understands the risks, benefits, adverse effects and alternatives. Agrees to treatment with the capacity to do so. No medical contraindications to treatment. Agrees to call clinic for any problems. The patient understands to call 911 or go to the nearest ED if life threatening or urgent symptoms  occur.

## 2021-03-31 ENCOUNTER — TELEPHONE (OUTPATIENT)
Dept: PSYCHIATRY | Facility: CLINIC | Age: 21
End: 2021-03-31

## 2021-03-31 NOTE — TELEPHONE ENCOUNTER
Nataliya Dominique APRN CNP [place], Laura, Alexandru Shanks is requesting a letter to give to his professor ahead of his next scheduled visit with me with the appointment date and time and requesting that they please accommodate as able.  Could you write this up and send out to him via Privatext?  His mailing address is not up to date in Epic.       Thanks!   jose ramon        Letter drafted and sent to pt via Privatext. Updated pt via Privatext as well.

## 2021-04-21 DIAGNOSIS — F39 MOOD DISORDER (H): ICD-10-CM

## 2021-04-22 ENCOUNTER — VIRTUAL VISIT (OUTPATIENT)
Dept: PSYCHIATRY | Facility: CLINIC | Age: 21
End: 2021-04-22
Attending: NURSE PRACTITIONER
Payer: COMMERCIAL

## 2021-04-22 DIAGNOSIS — F31.9 BIPOLAR AFFECTIVE DISORDER, REMISSION STATUS UNSPECIFIED (H): ICD-10-CM

## 2021-04-22 DIAGNOSIS — F39 MOOD DISORDER (H): ICD-10-CM

## 2021-04-22 PROCEDURE — 99215 OFFICE O/P EST HI 40 MIN: CPT | Mod: 95 | Performed by: NURSE PRACTITIONER

## 2021-04-22 RX ORDER — LAMOTRIGINE 100 MG/1
200 TABLET ORAL DAILY
Qty: 180 TABLET | Refills: 2 | Status: SHIPPED | OUTPATIENT
Start: 2021-04-22 | End: 2021-05-20

## 2021-04-22 RX ORDER — LITHIUM CARBONATE 450 MG
1800 TABLET, EXTENDED RELEASE ORAL DAILY
Qty: 360 TABLET | Refills: 1 | Status: SHIPPED | OUTPATIENT
Start: 2021-04-22 | End: 2021-05-20

## 2021-04-22 NOTE — PROGRESS NOTES
"VIDEO VISIT  Alexandru Mitchell is a 21 year old patient who is being evaluated via a billable video visit.      The patient has been notified of following:   \"We have found that certain health care needs can be provided without the need for an in-person physical exam. This service lets us provide the care you need with a video conversation. If a prescription is necessary we can send it directly to your pharmacy. If lab work is needed we can place an order for that and you can then stop by our lab to have the test done at a later time. Insurers are generally covering virtual visits as they would in-office visits so billing should not be different than normal.  If for some reason you do get billed incorrectly, you should contact the billing office to correct it and that number is in the AVS .    Patient has given verbal consent for video visit?: Yes   How would you like to obtain your AVS?: [employer]  AVS SmartPhrase [PsychAVS] has been placed in 'Patient Instructions': Yes      Video- Visit Details  Type of service:  video visit for medication management  Time of service:    Date:  04/22/2021    Video Start Time:  10:05 AM        Video End Time:   11:05 AM    Reason for video visit:  Patient unable to travel due to Covid-19/services only offered via telehealth  Originating Site (patient location):  [facility]   Location- Patient's home  Distant Site (provider location):  Remote location  Mode of Communication:  Video Conference via [email or web address]  Consent:  Patient has given verbal consent for video visit?: Yes       Psychiatry Clinic Follow Up Visit (telehealth)                                    Alexandru Mitchell is a 21 year old male who prefers the name Alexandru and pronoun he, him, his.  Therapist: None   PCP: Jerrica Billingsley      History of Present Illness                                                                                4, 4     -Alexandru is a 21-year-old male with bipolar 1 disorder, RACHEL and panic attacks.  He was last " "seen by me on 3/30/21 at which time lithium dose was increased to 1800 mg daily. He has not completed lithium level following this dose increase.  -He reports today that increasing lithium went well, no concerns noted.  He reports overall feeling less of a sense of dread and overwhelm.  Anxiety is reduced overall.  Sleep has improved to approx 7 hours per night.  Denies persistent depression or episodes of jose raul/hypomania.  Denies SI, intent or plan. Denies death ideation.   -He continues to have difficulty with concentration and energy. We completed the Diagnostic Interview for ADHD in Adults (DIVA) today to assess potential ADHD symptoms.  He was last evaluated for ADHD at age 4 and recalls that he was diagnosed with \"elements of ADHD\" at this time but never received further work up or treatment.  -He endorses 9/9 inattentive symptoms as present in both adulthood and childhood, including: failing to pay close attention to detail/making careless mistakes; difficulty sustaining attention; seems not to listen when spoken to; difficulty with follow through on instructions and fails to finish tasks; difficulty with organization; avoids or dislikes tasks that require sustained mental attention; often losing objects; easily distracted (cosmo by noises); and forgetful of daily activities.     -Additionally, he endorses 7-8/9 symptoms of hyperactivity in adulthood and childhood, including: excessive fidgeting; needing to leave seat in situations where sitting is expected; restlessness; difficulty engaging in leisure activities quietly; excessive talking; difficulty with patience/waiting turn; interruptive.   -These symptoms have lead to signfcnt difficulty in his academic pursuits and in his self-esteem.  Reports feeling very ashamed of his inability to follow through or sustain attention, \"extremely disappointed in self\", and that he is beginning to lack motivation to continue due to feeling too far behind.          -No " "recent substance use.  Has been trying to cut back on nicotine, minimal success, not interested in smoking cessation aides today.        Medication SE/ROS:  Poor concentration/cognitive dulling, poor energy level, increased thirst and increased urination (increased urination continues but improved overall).      Current psychiatric medications  Lithium 1800 mg at bedtime   Lamictal 200 mg daily  Metroprolol 50 mg daily (managed by PCP)    Psychiatric Review of Symptoms:   Depression:  see HPI  Elevated:  none currently   Psychosis:  none  Anxiety:  See HPI        Psychiatric History (no change today)     Suicide Attempt:   #- 3 by overdose on OTC medications; no medical attention sought.  Most Recent- 16 year old  SIB- cutting and scratching   Mary Beth- first episode- possibly as young as 12 years old characterized by hypomania.  Most recent manic episode in 10/2019.    Psychosis- AH, paranoia  Only during manic episodes.      Social History  FINANCIAL SUPPORT- UNKNOWN       CHILDREN- None       EARLY HISTORY/ EDUCATION- Currently student at [employer] studying [employer]    Family  hx - maternal grandfather and great uncle with bipolar disorder.  Maternal great uncle  by suicide        Psychiatric Medication Trials (no change today)     From [facility] note from Eliane Sullivan:      - Propranolol by PCP for anxiety - not helpful     -2017, Prozac (dose unknown) mainly for anxiety. Pt reports little to no symptom reduction with Prozac. Does recall increased suicidality before and during Prozac (compared to now), but does not think Prozac worsened suicidality or irritability.       2017 established with psychiatrist, Ruben Fuentes in [place]:  During 2017 was started on:   - venlafaxine, possibly was up to 225mg daily, but decreased to 150mg after ~6 months d/t increased irritability and \"feeling numb, no emotion\".  Denies worsening SI, unsure if mood cycling worsened.   - lamotrigine, 200mg is highest dose. " "Started for \"mood cycling\", psychiatrist didn't diagnose him with bipolar.    - perphenazine-amitriptyline 2-10mg. Started possibly for sleep/anxiety.  Pt does endorse hallucinations during periods of depression and jose raul, denies hallucinations when euthymic.     3218-5685 amitriptyline (without perphenazine)(dose unknown) - discontinued d/t urinary retention    2018- metoprolol started for tachycardia     2018 - nortriptyline started by neuro for migraine ppx.                                  Medical / Surgical History     Patient Active Problem List   Diagnosis     Generalized anxiety disorder     Social anxiety disorder     Migraine with aura, not intractable, without status migrainosus       Past Surgical History:   Procedure Laterality Date     HEAD & NECK SURGERY       TONSILLECTOMY           Medical Review of Systems                                                                                                    2, 10     Completed ROS is documented in HPI     Allergy   Patient has no known allergies.   Current Medications     Current Outpatient Medications   Medication Sig Dispense Refill     lamoTRIgine (LAMICTAL) 100 MG tablet Take 2 tablets (200 mg) by mouth daily 180 tablet 1     lithium ER (ESKALITH CR/LITHOBID) 450 MG CR tablet Take 4 tablets (1,800 mg) by mouth daily 360 tablet 1     metoprolol succinate ER (TOPROL-XL) 50 MG 24 hr tablet Take 1 tablet (50 mg) by mouth daily 90 tablet 3      Vitals                                                                                                                        3, 3     There were no vitals taken for this visit.      Labs and Data       PHQ9 Today: not completed   PHQ-9 SCORE 12/9/2019   PHQ-9 Total Score 15       Diagnosis, Assessment & Plan                                                                            m2, h3     DSM-5 diagnoses:    -Bipolar 1 Disorder   -Generalized Anxiety Disorder with panic attacks, r/o panic disorder  -Nicotine " use disorder  -likely ADHD    Alexandru Mitchell is a 21-year-old with a history of the diagnoses listed above.  In addition, he reports traumatic loss of family member to suicide at age 11, which precipitated worsening of MH symptoms, and symptoms of a trauma/stressor related disorder should be further assessed.  He has a genetic loading for bipolar disorder (maternal grandfather and great uncle).      Today, Alexandru reports stable mood and overall improvement with anxiety.  We completed a structured interview for ADHD today, and Alexandru endorses most symptoms of both the attentive and hyperactivity domains.  These symptoms have been present since childhood and do not seem to be better explained solely by mood episodes or anxiety.  We discussed treatment options for ADHD in the context of bipolar disorder, including starting with non-stimulant medication options and behavioral interventions.  He would like to consider either Strattera or Wellbutrin after reading further into these options.  Declines any other medication changes today. He denies SI, intent or plan and risk of harm to self or others is low.      Bipolar disorder  Continue lithium 1800 mg at bedtime  Continue lamictal 200 mg daily     Psychosis symptoms  Resolved with jose raul, no current antipsychotic medication required    Insomnia   No longer using trazodone, did not find helpful  May consider low dose seroquel if no improvement with increased lithium dose    Anxiety  Metoprolol originally prescribed for tachycardia, will defer to PCP for management.    Recommended CBT for anxiety     Nicotine use disorder  Declines NRT today    Long term management of high risk medications  1/27/21: BMP, TSH, lithium level (0.65 on 1500 mg daily)  Ordered last visit, agrees to complete in the next week: lithium level to be completed 5-7 days after dose increase       RTC: 4 weeks or sooner if needed     CRISIS NUMBERS:   Provided routinely in AVS.      Treatment Risk Statement:   The patient understands the risks, benefits, adverse effects and alternatives. Agrees to treatment with the capacity to do so. No medical contraindications to treatment. Agrees to call clinic for any problems. The patient understands to call 911 or go to the nearest ED if life threatening or urgent symptoms occur.     60 min spent on the date of the encounter in chart review, patient visit, review of tests, documentation and/or discussion with other providers about the issues documented above.

## 2021-04-23 DIAGNOSIS — Z79.899 HIGH RISK MEDICATIONS (NOT ANTICOAGULANTS) LONG-TERM USE: ICD-10-CM

## 2021-04-23 DIAGNOSIS — F31.9 BIPOLAR AFFECTIVE DISORDER, REMISSION STATUS UNSPECIFIED (H): ICD-10-CM

## 2021-04-23 LAB — LITHIUM SERPL-SCNC: 1.11 MMOL/L (ref 0.6–1.2)

## 2021-04-23 PROCEDURE — 36415 COLL VENOUS BLD VENIPUNCTURE: CPT | Performed by: NURSE PRACTITIONER

## 2021-04-23 PROCEDURE — 80178 ASSAY OF LITHIUM: CPT | Performed by: NURSE PRACTITIONER

## 2021-04-23 RX ORDER — LAMOTRIGINE 100 MG/1
TABLET ORAL
Qty: 60 TABLET | Refills: 0 | OUTPATIENT
Start: 2021-04-23

## 2021-05-07 DIAGNOSIS — F39 MOOD DISORDER (H): ICD-10-CM

## 2021-05-07 RX ORDER — ATOMOXETINE 40 MG/1
40 CAPSULE ORAL DAILY
Qty: 30 CAPSULE | Refills: 0 | Status: SHIPPED | OUTPATIENT
Start: 2021-05-07 | End: 2021-05-20

## 2021-05-07 NOTE — TELEPHONE ENCOUNTER
Received return phone call from the pt. He confirmed that he has not experienced any side effects or changes from the Strattera. He has also noticed his focus seems better and he's not as easiy distracted as before. This seems to wear off as the day progresses, so he feels there's still room for improvement.    Writer connected with the provider and confirmed that it's appropriate to fill the Strattera 40 mg daily at this time. Rx for #30 sent to [facility]. Pt notified of this.

## 2021-05-07 NOTE — TELEPHONE ENCOUNTER
Writer tried calling the pt to discuss the effectiveness of 20 mg and whether he experienced any side effects. No answer. LVM requesting a c/b or response via [employer].

## 2021-05-07 NOTE — TELEPHONE ENCOUNTER
Avis Aparicio, RN  Avis Aparicio, RN   Phone Number: [phone]          Previous Messages    ----- Message -----   From: Amarilys Levy   Sent: 5/7/2021   2:47 PM CDT   To: [facility] [place]   Subject: Rx Refill - [facility]     Phone Message     May a detailed message be left on voicemail: yes     Reason for Call: Medication Refill Request     Has the patient contacted the pharmacy for the refill? Yes   Name of medication being requested: atomoxetine   Provider who prescribed the medication: Patsy Dominique   Pharmacy: [facility]   Date medication is needed: 3 days left     Per patient, dose is due to increase to 40 mg daily.       Action Taken: Message routed to:  Other: nursing     Travel Screening: Not Applicable

## 2021-05-20 ENCOUNTER — VIRTUAL VISIT (OUTPATIENT)
Dept: PSYCHIATRY | Facility: CLINIC | Age: 21
End: 2021-05-20
Attending: NURSE PRACTITIONER
Payer: COMMERCIAL

## 2021-05-20 DIAGNOSIS — F31.9 BIPOLAR AFFECTIVE DISORDER, REMISSION STATUS UNSPECIFIED (H): ICD-10-CM

## 2021-05-20 DIAGNOSIS — F90.2 ATTENTION DEFICIT HYPERACTIVITY DISORDER (ADHD), COMBINED TYPE: ICD-10-CM

## 2021-05-20 DIAGNOSIS — F39 MOOD DISORDER (H): ICD-10-CM

## 2021-05-20 PROCEDURE — 99214 OFFICE O/P EST MOD 30 MIN: CPT | Mod: 95 | Performed by: NURSE PRACTITIONER

## 2021-05-20 RX ORDER — LAMOTRIGINE 100 MG/1
200 TABLET ORAL DAILY
Qty: 180 TABLET | Refills: 2 | Status: SHIPPED | OUTPATIENT
Start: 2021-05-20 | End: 2021-06-10

## 2021-05-20 RX ORDER — ATOMOXETINE 40 MG/1
40 CAPSULE ORAL DAILY
Qty: 30 CAPSULE | Refills: 2 | Status: SHIPPED | OUTPATIENT
Start: 2021-05-20 | End: 2021-06-10

## 2021-05-20 RX ORDER — LITHIUM CARBONATE 450 MG
1800 TABLET, EXTENDED RELEASE ORAL DAILY
Qty: 360 TABLET | Refills: 1 | Status: SHIPPED | OUTPATIENT
Start: 2021-05-20 | End: 2021-06-10

## 2021-05-20 NOTE — PROGRESS NOTES
"VIDEO VISIT  Alexandru Mitchell is a 21 year old patient who is being evaluated via a billable video visit.      The patient has been notified of following:   \"We have found that certain health care needs can be provided without the need for an in-person physical exam. This service lets us provide the care you need with a video conversation. If a prescription is necessary we can send it directly to your pharmacy. If lab work is needed we can place an order for that and you can then stop by our lab to have the test done at a later time. Insurers are generally covering virtual visits as they would in-office visits so billing should not be different than normal.  If for some reason you do get billed incorrectly, you should contact the billing office to correct it and that number is in the AVS .    Patient has given verbal consent for video visit?: Yes   How would you like to obtain your AVS?: [employer]  AVS SmartPhrase [PsychAVS] has been placed in 'Patient Instructions': Yes      Video- Visit Details  Type of service:  video visit for medication management  Time of service:    Date:  05/20/2021    Video Start Time:  1:00 PM        Video End Time:   1:30 PM    Reason for video visit:  Patient unable to travel due to Covid-19/services only offered via telehealth  Originating Site (patient location):  [facility]   Location- Patient's home  Distant Site (provider location):  Remote location  Mode of Communication:  Video Conference via [email or web address]  Consent:  Patient has given verbal consent for video visit?: Yes       Psychiatry Clinic Follow Up Visit (telehealth)                                    Alexandru Mitchell is a 21 year old male who prefers the name Alexandru and pronoun he, him, his.  Therapist: None   PCP: Jerrica Billingsley      History of Present Illness                                                                                4, 4     -Alexandru is a 21-year-old male with bipolar 1 disorder, RACHEL and panic attacks.  He was last seen " by me on 4/22/21 at which time we completed a structured interview for ADHD.  Since this visit he started on Strattera, most recently increased to 40 mg daily on 5/7/21.  Li level on 4/23/21 was WNL at 1.11.   -Alexandru reports today that he has tolerated Strattera without noticing any concerning side effects.  He reports that he has noticed some positive effects from Strattera.  He notes that his recent finals weeks was the best that he has had.  He has been able to focus more on his schoolwork and set boundaries around social engagements.  He is feeling less restless which is also leading to better sleep.  Confidence and self esteem may also be improving.  Reports he is no longer feeling sense of impending doom.  He noticed some benefit at 25 mg dose but more significant benefit with increasing Strattera to 40 mg daily.    -Continues to have difficulty following conversations and paying attention, being circumstantial.  Endorses fatigue with consistent focus.  During finals week sleep schedule was erratic, however is now getting back onto a schedule.  Has been going to bed at 11-12 and sleeping 6 hours, will then nap in the afternoon.    -He denies any symptoms concerning for jose raul or psychosis.  Denies persistent depression.   Denies SI, intent or plan. Denies death ideation.        -No recent substance use.  Has been trying to cut back on nicotine, minimal success, not interested in smoking cessation aides currently.         Medication SE/ROS:  Poor concentration/cognitive dulling, poor energy level.  Increased thirst/urination have improved.  Did experience some diarrhea after starting Strattera, this is  Improving.      Has now finished his semester.  Will be staying on campus through June for a summer course and then returning home to [place] for the moth of July.      Current psychiatric medications  Lithium 1800 mg at bedtime   Lamictal 200 mg daily  Strattera 40 mg daily   Metroprolol 50 mg daily (managed by  "PCP)    Psychiatric Review of Symptoms:   Depression:  see HPI  Elevated:  none currently   Psychosis:  none  Anxiety:  See HPI        Psychiatric History (no change today)     Suicide Attempt:   #- 3 by overdose on OTC medications; no medical attention sought.  Most Recent- 16 year old  SIB- cutting and scratching   Mary Beth- first episode- possibly as young as 12 years old characterized by hypomania.  Most recent manic episode in 10/2019.    Psychosis- AH, paranoia  Only during manic episodes.      Social History  FINANCIAL SUPPORT- UNKNOWN       CHILDREN- None       EARLY HISTORY/ EDUCATION- Currently student at [employer] studying [employer]    Family  hx - maternal grandfather and great uncle with bipolar disorder.  Maternal great uncle  by suicide        Psychiatric Medication Trials (no change today)     From [facility] note from Eliane Sullivan:      - Propranolol by PCP for anxiety - not helpful     -2017, Prozac (dose unknown) mainly for anxiety. Pt reports little to no symptom reduction with Prozac. Does recall increased suicidality before and during Prozac (compared to now), but does not think Prozac worsened suicidality or irritability.       2017 established with psychiatrist, Ruben Fuentes in [place]:  During 2017 was started on:   - venlafaxine, possibly was up to 225mg daily, but decreased to 150mg after ~6 months d/t increased irritability and \"feeling numb, no emotion\".  Denies worsening SI, unsure if mood cycling worsened.   - lamotrigine, 200mg is highest dose. Started for \"mood cycling\", psychiatrist didn't diagnose him with bipolar.    - perphenazine-amitriptyline 2-10mg. Started possibly for sleep/anxiety.  Pt does endorse hallucinations during periods of depression and mary beth, denies hallucinations when euthymic.     0881-0354 amitriptyline (without perphenazine)(dose unknown) - discontinued d/t urinary retention    - metoprolol started for tachycardia      - nortriptyline " started by neuro for migraine ppx.                                  Medical / Surgical History     Patient Active Problem List   Diagnosis     Generalized anxiety disorder     Social anxiety disorder     Migraine with aura, not intractable, without status migrainosus       Past Surgical History:   Procedure Laterality Date     HEAD & NECK SURGERY       TONSILLECTOMY           Medical Review of Systems                                                                                                    2, 10     Completed ROS is documented in HPI     Allergy   Patient has no known allergies.   Current Medications     Current Outpatient Medications   Medication Sig Dispense Refill     atomoxetine (STRATTERA) 40 MG capsule Take 1 capsule (40 mg) by mouth daily 30 capsule 0     lamoTRIgine (LAMICTAL) 100 MG tablet Take 2 tablets (200 mg) by mouth daily 180 tablet 2     lithium (ESKALITH CR/LITHOBID) 450 MG CR tablet Take 4 tablets (1,800 mg) by mouth daily 360 tablet 1     metoprolol succinate ER (TOPROL-XL) 50 MG 24 hr tablet Take 1 tablet (50 mg) by mouth daily 90 tablet 3      Vitals                                                                                                                        3, 3     There were no vitals taken for this visit.      Labs and Data       PHQ9 Today: not completed   PHQ-9 SCORE 12/9/2019   PHQ-9 Total Score 15       Diagnosis, Assessment & Plan                                                                            m2, h3     DSM-5 diagnoses:    -Bipolar 1 Disorder   -Generalized Anxiety Disorder with panic attacks, r/o panic disorder  -Nicotine use disorder  -likely ADHD    Alexandru Mitchell is a 21-year-old with a history of the diagnoses listed above.  In addition, he reports traumatic loss of family member to suicide at age 11, which precipitated worsening of MH symptoms, and symptoms of a trauma/stressor related disorder should be further assessed.  He has a genetic loading for  bipolar disorder (maternal grandfather and great uncle).      Today, Alexandru reports a positive response to Strattera for ADHD symptoms.  No concern today for mood or psychosis symptoms.He declines medication changes today.  Reviewed most recent lithium level, WNL. Reviewed risks of Strattera in the setting of bipolar disorder, he will monitor closely for symptoms concerning for jose raul and will discontinue Strattera and contact clinic immediately.  He denies SI, intent or plan and risk of harm to self or others is low.      Bipolar disorder  Continue lithium 1800 mg at bedtime  Continue lamictal 200 mg daily     Psychosis symptoms  Resolved with jose raul, no current antipsychotic medication required    ADHD  Continue Strattera 40 mg daily     Insomnia   No longer using trazodone, did not find helpful  May consider low dose seroquel if no improvement with increased lithium dose    Anxiety  Metoprolol originally prescribed for tachycardia, will defer to PCP for management.    Recommended CBT for anxiety    Nicotine use disorder  Declines NRT today    Long term management of high risk medications  1/27/21: BMP, TSH, lithium level (0.65 on 1500 mg daily)  4/23/21: Li level 1.11        RTC: 4 weeks or sooner if needed     CRISIS NUMBERS:   Provided routinely in AVS.      Treatment Risk Statement:  The patient understands the risks, benefits, adverse effects and alternatives. Agrees to treatment with the capacity to do so. No medical contraindications to treatment. Agrees to call clinic for any problems. The patient understands to call 911 or go to the nearest ED if life threatening or urgent symptoms occur.

## 2021-05-20 NOTE — PATIENT INSTRUCTIONS
Thank you for coming to the [facility] MENTAL HEALTH & ADDICTION [place] [facility].    Lab Testing:  If you had lab testing today and your results are reassuring or normal they will be mailed to you or sent through [employer] within 7 days.   If the lab tests need quick action we will call you with the results.  The phone number we will call with results is # [phone] (home) . If this is not the best number please call our clinic and change the number.    Medication Refills:  If you need any refills please call your pharmacy and they will contact us. Our fax number for refills is [phone]. Please allow three business for refill processing.   If you need to  your refill at a new pharmacy, please contact the new pharmacy directly. The new pharmacy will help you get your medications transferred.     Scheduling:  If you have any concerns about today's visit or wish to schedule another appointment please call our office during normal business hours [phone] (8-5:00 M-F)    Contact Us:  Please call [phone] during business hours (8-5:00 M-F).  If after clinic hours, or on the weekend, please call [phone].    Financial Assistance: [phone]  [phone]  [place] [facility], [facility]: [phone]  [phone]  Medical Records: [phone]    MENTAL HEALTH CRISIS NUMBERS:  [facility]:   [facility]: [phone]  Crisis Residence [facility]: [phone]   Walk-In Counseling Center [facility]: [phone]   COPE  [place] Mobile Team: [phone] (adults) [phone] (child)     [facility]:   [facility]: [phone]   Walk-in counseling [facility] House: [phone]   Walk-in counseling [facility]: [phone]   Crisis Residence [facility] Residence: [phone]   Urgent Care Adult Mental Health: [phone] Mobile team AND  crisis  line    Other Crisis Numbers:   National Suicide Prevention Lifeline: 914-849-QLCT (284-073-2389)  CRISIS TEXT LINE: Text to 460252 for any crisis 24/7; OR www.crisistextline.org   Poison Control Center: 9-895-536-5058  CHILD: [facility] needs assessment team: [phone]   Trans Lifeline: 1-532.186.7280  [place] [facility] Lifeline: 1-253.729.7513    For a medical emergency please call 911 or go to the nearest ER.         _____________________________________________    Again thank you for choosing [facility] MENTAL HEALTH & ADDICTION [place] [facility] and please let us know how we can best partner with you to improve you and your family's health.    You may be receiving a survey regarding this appointment. We would love to have your feedback, both positive and negative. The survey is done by an external company, so your answers are anonymous.

## 2021-06-10 ENCOUNTER — VIRTUAL VISIT (OUTPATIENT)
Dept: PSYCHIATRY | Facility: CLINIC | Age: 21
End: 2021-06-10
Attending: NURSE PRACTITIONER
Payer: COMMERCIAL

## 2021-06-10 DIAGNOSIS — F39 MOOD DISORDER (H): ICD-10-CM

## 2021-06-10 DIAGNOSIS — F31.9 BIPOLAR AFFECTIVE DISORDER, REMISSION STATUS UNSPECIFIED (H): ICD-10-CM

## 2021-06-10 DIAGNOSIS — Z79.899 HIGH RISK MEDICATIONS (NOT ANTICOAGULANTS) LONG-TERM USE: Primary | ICD-10-CM

## 2021-06-10 PROCEDURE — 99214 OFFICE O/P EST MOD 30 MIN: CPT | Mod: 95 | Performed by: NURSE PRACTITIONER

## 2021-06-10 RX ORDER — ATOMOXETINE 40 MG/1
40 CAPSULE ORAL DAILY
Qty: 30 CAPSULE | Refills: 2 | Status: SHIPPED | OUTPATIENT
Start: 2021-06-10 | End: 2021-07-06

## 2021-06-10 RX ORDER — LAMOTRIGINE 100 MG/1
200 TABLET ORAL DAILY
Qty: 180 TABLET | Refills: 2 | Status: SHIPPED | OUTPATIENT
Start: 2021-06-10 | End: 2021-07-06

## 2021-06-10 RX ORDER — LITHIUM CARBONATE 450 MG
1800 TABLET, EXTENDED RELEASE ORAL DAILY
Qty: 360 TABLET | Refills: 2 | Status: SHIPPED | OUTPATIENT
Start: 2021-06-10 | End: 2021-08-12

## 2021-06-10 NOTE — PROGRESS NOTES
"VIDEO VISIT  Alexandru Mitchell is a 21 year old patient who is being evaluated via a billable video visit.      The patient has been notified of following:   \"We have found that certain health care needs can be provided without the need for an in-person physical exam. This service lets us provide the care you need with a video conversation. If a prescription is necessary we can send it directly to your pharmacy. If lab work is needed we can place an order for that and you can then stop by our lab to have the test done at a later time. Insurers are generally covering virtual visits as they would in-office visits so billing should not be different than normal.  If for some reason you do get billed incorrectly, you should contact the billing office to correct it and that number is in the AVS .    Patient has given verbal consent for video visit?: Yes   How would you like to obtain your AVS?: [employer]  AVS SmartPhrase [PsychAVS] has been placed in 'Patient Instructions': Yes      Video- Visit Details  Type of service:  video visit for medication management  Time of service:    Date:  06/10/2021    Video Start Time:  11:03 AM       Video End Time:  11:30 AM    Reason for video visit:  Patient unable to travel due to Covid-19/services only offered via telehealth  Originating Site (patient location):  [facility]   Location- Patient's home  Distant Site (provider location):  Remote location  Mode of Communication:  Video Conference via [employer]  Consent:  Patient has given verbal consent for video visit?: Yes       Psychiatry Clinic Follow Up Visit (telehealth)                                    Alexandru Mitchell is a 21 year old male who prefers the name Alexandru and pronoun he, him, his.  Therapist: None   PCP: Jerrica Billingsley      History of Present Illness                                                                                4, 4     -Alexandru is a 21-year-old male with bipolar 1 disorder, RACHEL and panic attacks.  He was last seen " by me on 5/20/21 at which time no medication changes were made.  He has now been on Strattera 40 mg daily x approx 1 month.  Li level on 4/23/21 was WNL at 1.11.   -Alexandru reports today that he has been doing well overall.  He received his grades for the semester and got 4 A's and one B, this was his best semester to date.  He is now taking a summer course.  He is still finding Strattera to be helpful in staying on track, focusing for longer periods of time.    -Sleep schedule is improving post-finals.  Has been going to bed around midnight, sleeping 6-9 hours per night.  Does nap during the day.  Takes about 1.5 hours to fall asleep.  Prior to Strattera was falling asleep within 30-45 minutes.    -He denies any symptoms concerning for mary beth or psychosis.  Denies persistent depression.   Denies SI, intent or plan. Denies death ideation.        -No recent substance use.  Has been trying to cut back on nicotine, minimal success, not interested in smoking cessation aides currently.         Medication SE/ROS:  Is experiencing diarrhea most days, having BMs twice daily.  Denies nausea or vomiting.  Has continued to drink 3-4 liters of water daily.  Urinates frequently throughout the day, up to 2x per hour, and wakes up 3x at night to use bathroom (this is increased from 2x per night).  Denies tremor, balance issues.  No change in sedation, energy level.      Current psychiatric medications  Lithium 1800 mg at bedtime   Lamictal 200 mg daily  Strattera 40 mg daily   Metroprolol 50 mg daily (managed by PCP)    Psychiatric Review of Symptoms:   Depression:  see HPI  Elevated:  none currently   Psychosis:  none  Anxiety:  See HPI      Psychiatric History (no change today)     Suicide Attempt:   #- 3 by overdose on OTC medications; no medical attention sought.  Most Recent- 16 year old  SIB- cutting and scratching   Mary Beth- first episode- possibly as young as 12 years old characterized by hypomania.  Most recent manic episode  "in 10/2019.    Psychosis- AH, paranoia  Only during manic episodes.      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  Maternal great uncle  by suicide        Psychiatric Medication Trials (no change today)         - Propranolol by PCP for anxiety - not helpful     /3597-6295, Prozac (dose unknown) mainly for anxiety. Pt reports little to no symptom reduction with Prozac. Does recall increased suicidality before and during Prozac (compared to now), but does not think Prozac worsened suicidality or irritability.       2017 established with psychiatrist, Ruben Fuentes in [place]:  During 2017 was started on:   - venlafaxine, possibly was up to 225mg daily, but decreased to 150mg after ~6 months d/t increased irritability and \"feeling numb, no emotion\".  Denies worsening SI, unsure if mood cycling worsened.   - lamotrigine, 200mg is highest dose. Started for \"mood cycling\", psychiatrist didn't diagnose him with bipolar.    - perphenazine-amitriptyline 2-10mg. Started possibly for sleep/anxiety.  Pt does endorse hallucinations during periods of depression and jose raul, denies hallucinations when euthymic.     9117-0462 amitriptyline (without perphenazine)(dose unknown) - discontinued d/t urinary retention    - metoprolol started for tachycardia      - nortriptyline started by neuro for migraine ppx.                                  Medical / Surgical History     Patient Active Problem List   Diagnosis     Generalized anxiety disorder     Migraine with aura, not intractable, without status migrainosus     Attention deficit hyperactivity disorder (ADHD), combined type     Bipolar disorder (H)       Past Surgical History:   Procedure Laterality Date     HEAD & NECK SURGERY       TONSILLECTOMY           Medical Review of Systems       "                                                                                              2, 10     Completed ROS is documented in HPI     Allergy   Patient has no known allergies.   Current Medications     Current Outpatient Medications   Medication Sig Dispense Refill     atomoxetine (STRATTERA) 40 MG capsule Take 1 capsule (40 mg) by mouth daily 30 capsule 2     lamoTRIgine (LAMICTAL) 100 MG tablet Take 2 tablets (200 mg) by mouth daily 180 tablet 2     lithium (ESKALITH CR/LITHOBID) 450 MG CR tablet Take 4 tablets (1,800 mg) by mouth daily 360 tablet 1     metoprolol succinate ER (TOPROL-XL) 50 MG 24 hr tablet Take 1 tablet (50 mg) by mouth daily 90 tablet 3      Vitals                                                                                                                        3, 3     There were no vitals taken for this visit.      Labs and Data       PHQ9 Today: not completed   PHQ-9 SCORE 12/9/2019   PHQ-9 Total Score 15       Diagnosis, Assessment & Plan                                                                            m2, h3     DSM-5 diagnoses:    -Bipolar 1 Disorder   -Generalized Anxiety Disorder with panic attacks, r/o panic disorder  -Nicotine use disorder  -ADHD    Alexandru Mitchell is a 21-year-old with a history of the diagnoses listed above.  In addition, he reports traumatic loss of family member to suicide at age 11, which precipitated worsening of MH symptoms, and symptoms of a trauma/stressor related disorder should be further assessed.  He has a genetic loading for bipolar disorder (maternal grandfather and great uncle).      Today, Alexandru reports an ongoing positive response to Strattera for ADHD symptoms.  No concern today for mood or psychosis symptoms.  Has been experiencing an increase in some side effects, will repeat lithium labs today.  Declines other medication changes.  He will attempt taking Strattera earlier in the day and possibly adding PRN melatonin to help with  sleep onset.  He denies SI, intent or plan and risk of harm to self or others is low.      Bipolar disorder  Continue lithium 1800 mg at bedtime  Continue lamictal 200 mg daily     ADHD  Continue Strattera 40 mg daily     Insomnia   No longer using trazodone, did not find helpful  He will try melatonin  May consider low dose seroquel in the future    Anxiety  Metoprolol originally prescribed for tachycardia, will defer to PCP for management.    Recommended CBT for anxiety    Nicotine use disorder  Declines NRT    Long term management of high risk medications  1/27/21: BMP, TSH, lithium level (0.65 on 1500 mg daily)  4/23/21: Li level 1.11  Ordered today: repeat lithium level due to side effects; BMP      RTC: 4 weeks or sooner if needed     CRISIS NUMBERS:   Provided routinely in AVS.      Treatment Risk Statement:  The patient understands the risks, benefits, adverse effects and alternatives. Agrees to treatment with the capacity to do so. No medical contraindications to treatment. Agrees to call clinic for any problems. The patient understands to call 911 or go to the nearest ED if life threatening or urgent symptoms occur.

## 2021-06-10 NOTE — PATIENT INSTRUCTIONS
Care Transitions case created; awaiting discharge to begin outreach.      Kin Horvath,  Nice to see you today.  Please remember to get your lithium level re-checked within the next few days.  This should be in the morning, 10-12 hours after bedtime lithium dose.     INSTRUCTIONS FOR USE OF LITHIUM      DO:    - call clinic if you, or another provider, makes any medication changes  - call clinic if your use of ibuprofen (or similar) increases at all  - call clinic if - call clinic if you experience any symptom listed below  - LABS:   obtain all labs as directed,  labs are done every 6 months on an ongoing basis;                 lithium level is drawn 5 days after starting lithium and after dose changes;                  labs also include kidney and thyroid function;                 all labs are drawn between 10 and 14 hours after your last dose (12hrs is ideal)      DO NOT:    - stop this med abruptly  - use street drugs or alcohol while being treated with lithium  - increase use of ibuprofen (or similar) without calling the clinic      TRY TO AVOID:    - any use of ibuprofen (or similar);  MAY use acetaminophen (Tylenol) for pain  - excessive sweating  - excessive salt intake  - use of over the counter herbal remedies      FOOD:  take with or without food but AVOID excessive salt intake      COMMON ADVERSE EFFECTS: acne, weight gain, nausea, tremor, increased urination, increased thirst, sedation, loose stools, blurring of memory and concentration      CALL CLINIC URGENTLY or EMERGENCY ROOM:  if you have any concerns, especially the following...  - blurred vision  - heart palpitations  - ear ringing  - excessive urination (especially at night)  OR  excessive thirst  - seizure  - kidney problems or any new medical problem  - thoughts of death or hurting yourself    - suspect Serotonin Syndrome:   confusion   tremor  severe headache  sweats  fever   funny feeling in muscles  need to pace / restlessness   severe nausea, vomiting  diarrhea      Thank you for coming to the [facility]  MENTAL HEALTH & ADDICTION [facility].    Lab Testing:  If you had lab testing today and your results are reassuring or normal they will be mailed to you or sent through [employer] within 7 days.   If the lab tests need quick action we will call you with the results.  The phone number we will call with results is # [phone] (home) . If this is not the best number please call our clinic and change the number.    Medication Refills:  If you need any refills please call your pharmacy and they will contact us. Our fax number for refills is [phone]. Please allow three business for refill processing.   If you need to  your refill at a new pharmacy, please contact the new pharmacy directly. The new pharmacy will help you get your medications transferred.     Scheduling:  If you have any concerns about today's visit or wish to schedule another appointment please call our office during normal business hours [phone] (8-5:00 M-F)    Contact Us:  Please call [phone] during business hours (8-5:00 M-F).  If after clinic hours, or on the weekend, please call [phone].    Financial Assistance: [phone]  [phone]  [facility] Billing Office, [facility]: [phone]  [place] [phone]  Medical Records: [phone]    MENTAL HEALTH CRISIS NUMBERS:  [facility]:   [facility]: [phone]  Crisis Residence [facility] Residence: [phone]   Walk-In Counseling Center [facility]: [phone]   COPE  [place] Mobile Team: [phone] (adults) [phone] (child)     [facility]:   [facility]: [phone]   Walk-in counseling [facility] House: [phone]   Walk-in counseling [facility]: [phone]   Crisis Residence [facility] Residence: [phone]   Urgent Care Adult Mental Health: [phone] Mobile team AND  crisis line    Other Crisis Numbers:   National Suicide  Prevention Lifeline: 163-981-MNSR (434-373-9121)  CRISIS TEXT LINE: Text to 088644 for any crisis 24/7; OR www.crisistextline.org   Poison Control Center: 9-022-493-2967  CHILD: [facility] needs assessment team: [phone]   Trans Lifeline: 1-954.640.9116  [facility] Lifeline: 1-277.589.8714    For a medical emergency please call 911 or go to the nearest ER.         _____________________________________________    Again thank you for choosing [facility] MENTAL HEALTH & ADDICTION [place] [facility] and please let us know how we can best partner with you to improve you and your family's health.    You may be receiving a survey regarding this appointment. We would love to have your feedback, both positive and negative. The survey is done by an external company, so your answers are anonymous.

## 2021-06-21 DIAGNOSIS — Z79.899 HIGH RISK MEDICATIONS (NOT ANTICOAGULANTS) LONG-TERM USE: ICD-10-CM

## 2021-06-21 LAB
ANION GAP SERPL CALCULATED.3IONS-SCNC: 7 MMOL/L (ref 3–14)
BUN SERPL-MCNC: 10 MG/DL (ref 7–30)
CALCIUM SERPL-MCNC: 9 MG/DL (ref 8.5–10.1)
CHLORIDE SERPL-SCNC: 104 MMOL/L (ref 94–109)
CO2 SERPL-SCNC: 26 MMOL/L (ref 20–32)
CREAT SERPL-MCNC: 1.02 MG/DL (ref 0.66–1.25)
GFR SERPL CREATININE-BSD FRML MDRD: >90 ML/MIN/{1.73_M2}
GLUCOSE SERPL-MCNC: 154 MG/DL (ref 70–99)
LITHIUM SERPL-SCNC: 0.97 MMOL/L (ref 0.6–1.2)
POTASSIUM SERPL-SCNC: 3.7 MMOL/L (ref 3.4–5.3)
SODIUM SERPL-SCNC: 137 MMOL/L (ref 133–144)

## 2021-06-21 PROCEDURE — 80178 ASSAY OF LITHIUM: CPT | Performed by: NURSE PRACTITIONER

## 2021-06-21 PROCEDURE — 80048 BASIC METABOLIC PNL TOTAL CA: CPT | Performed by: NURSE PRACTITIONER

## 2021-06-21 PROCEDURE — 36415 COLL VENOUS BLD VENIPUNCTURE: CPT | Performed by: NURSE PRACTITIONER

## 2021-07-06 ENCOUNTER — MYC MEDICAL ADVICE (OUTPATIENT)
Dept: INTERNAL MEDICINE | Facility: CLINIC | Age: 21
End: 2021-07-06

## 2021-07-06 DIAGNOSIS — I10 ESSENTIAL HYPERTENSION: ICD-10-CM

## 2021-07-08 RX ORDER — METOPROLOL SUCCINATE 50 MG/1
50 TABLET, EXTENDED RELEASE ORAL DAILY
Qty: 90 TABLET | Refills: 0 | Status: SHIPPED | OUTPATIENT
Start: 2021-07-08 | End: 2021-10-14

## 2021-08-07 ENCOUNTER — MYC REFILL (OUTPATIENT)
Dept: PSYCHIATRY | Facility: CLINIC | Age: 21
End: 2021-08-07

## 2021-08-07 DIAGNOSIS — F39 MOOD DISORDER (H): ICD-10-CM

## 2021-08-09 RX ORDER — ATOMOXETINE 40 MG/1
40 CAPSULE ORAL DAILY
Qty: 30 CAPSULE | Refills: 0 | Status: SHIPPED | OUTPATIENT
Start: 2021-08-09 | End: 2021-08-12

## 2021-08-09 NOTE — TELEPHONE ENCOUNTER
Last seen: 6/10/21  RTC: 4 weeks  Cancel: none  No-show: none  Next appt: 8/12/21     Medication requested: atomoxetine (STRATTERA) 40 MG capsule  Directions: Take 1 capsule (40 mg) by mouth daily  Qty: 30  Last refilled: approximately 7/6/21 per med tab      Medication refill approved per refill protocol    30 d/s sent to pharmacy

## 2021-08-12 ENCOUNTER — VIRTUAL VISIT (OUTPATIENT)
Dept: PSYCHIATRY | Facility: CLINIC | Age: 21
End: 2021-08-12
Attending: NURSE PRACTITIONER
Payer: COMMERCIAL

## 2021-08-12 DIAGNOSIS — F31.9 BIPOLAR AFFECTIVE DISORDER, REMISSION STATUS UNSPECIFIED (H): ICD-10-CM

## 2021-08-12 DIAGNOSIS — F39 MOOD DISORDER (H): ICD-10-CM

## 2021-08-12 DIAGNOSIS — Z79.899 HIGH RISK MEDICATIONS (NOT ANTICOAGULANTS) LONG-TERM USE: Primary | ICD-10-CM

## 2021-08-12 PROCEDURE — 99214 OFFICE O/P EST MOD 30 MIN: CPT | Mod: 95 | Performed by: NURSE PRACTITIONER

## 2021-08-12 RX ORDER — ATOMOXETINE 40 MG/1
40 CAPSULE ORAL DAILY
Qty: 30 CAPSULE | Refills: 2 | Status: SHIPPED | OUTPATIENT
Start: 2021-08-12 | End: 2021-11-18

## 2021-08-12 RX ORDER — LAMOTRIGINE 100 MG/1
200 TABLET ORAL DAILY
Qty: 60 TABLET | Refills: 2 | Status: SHIPPED | OUTPATIENT
Start: 2021-08-12 | End: 2021-11-18

## 2021-08-12 RX ORDER — LITHIUM CARBONATE 450 MG
1800 TABLET, EXTENDED RELEASE ORAL DAILY
Qty: 360 TABLET | Refills: 2 | Status: SHIPPED | OUTPATIENT
Start: 2021-08-12 | End: 2021-11-18

## 2021-08-12 NOTE — PROGRESS NOTES
"VIDEO VISIT  Alexandru Mitchell is a 21 year old patient who is being evaluated via a billable video visit.      The patient has been notified of following:   \"We have found that certain health care needs can be provided without the need for an in-person physical exam. This service lets us provide the care you need with a video conversation. If a prescription is necessary we can send it directly to your pharmacy. If lab work is needed we can place an order for that and you can then stop by our lab to have the test done at a later time. Insurers are generally covering virtual visits as they would in-office visits so billing should not be different than normal.  If for some reason you do get billed incorrectly, you should contact the billing office to correct it and that number is in the AVS .    Patient has given verbal consent for video visit?: Yes   How would you like to obtain your AVS?: flexReceipts  AVS SmartPhrase [PsychAVS] has been placed in 'Patient Instructions': Yes      Video- Visit Details  Type of service:  video visit for medication management  Time of service:    Date:  08/12/2021    Video Start Time:  1:33 PM      Video End Time:  2:00 PM    Reason for video visit:  Patient unable to travel due to Covid-19/services only offered via telehealth  Originating Site (patient location):  [facility]   Location- Patient's home  Distant Site (provider location):  Remote location  Mode of Communication:  Video Conference via AmWell  Consent:  Patient has given verbal consent for video visit?: Yes       Psychiatry Clinic Follow Up Visit (telehealth)                                    Alexandru Mitchell is a 21 year old male who prefers the name Alexandru and pronoun he, him, his.  Therapist: None   PCP: Jerrica Billingsley      History of Present Illness                                                                                4, 4     -Alexandru is a 21-year-old male with bipolar 1 disorder, RACHEL and panic attacks.  He was last seen by " "me on 6/10/21 at which time no medication changes were made.  -He reports he has been doing well since his last visit.  He spent some time at home in [place] and has now returned to [place] for the next academic year.  Mood has been \"stable\" and he denies persistent depression or mary beth.  Denies death ideation or SI.   -He has noticed an improvement with his concentration on the Strattera. He still notices some symptoms of ADHD (difficulty following conversations, hype-rfocusing on certain tasks), however does not feel he needs to adjust dose of his Strattera currently.    -Sleep has improved, sleeping 7-8 hours per night.  Is also falling asleep faster, now within 30-45 minutes.    -No recent substance use.  Has been trying to cut back on nicotine, has had some success with this.  Not currently interested in NRT.        Medication SE/ROS:  Diarrhea has now resolved.  Denies nausea or vomiting.  Has continued to drink 3-4 liters of water daily.  Urinates frequently throughout the day, up to 2x per hour, and wakes up 3x at night to use bathroom (this is increased from 2x per night).  Denies tremor, balance issues.  No change in sedation, energy level.      Current psychiatric medications  Lithium 1800 mg at bedtime   Lamictal 200 mg daily  Strattera 40 mg daily   Metroprolol 50 mg daily (managed by PCP)    Psychiatric Review of Symptoms:   Depression:  Denies  Elevated:  none currently   Psychosis:  none  Anxiety:  Denies      Psychiatric History (no change today)     Suicide Attempt:   #- 3 by overdose on OTC medications; no medical attention sought.  Most Recent- 16 year old  SIB- cutting and scratching   Mary Beth- first episode- possibly as young as 12 years old characterized by hypomania.  Most recent manic episode in 10/2019.    Psychosis- AH, paranoia  Only during manic episodes.      Social History  FINANCIAL SUPPORT- UNKNOWN       CHILDREN- None       EARLY HISTORY/ EDUCATION- Currently student at [employer] studying " "[employer]    Family MH hx - maternal grandfather and great uncle with bipolar disorder.  Maternal great uncle  by suicide        Psychiatric Medication Trials (no change today)         - Propranolol by PCP for anxiety - not helpful     /0365-1446, Prozac (dose unknown) mainly for anxiety. Pt reports little to no symptom reduction with Prozac. Does recall increased suicidality before and during Prozac (compared to now), but does not think Prozac worsened suicidality or irritability.       2017 established with psychiatrist, Ruben Fuentes in [place]:  During 2017 was started on:   - venlafaxine, possibly was up to 225mg daily, but decreased to 150mg after ~6 months d/t increased irritability and \"feeling numb, no emotion\".  Denies worsening SI, unsure if mood cycling worsened.   - lamotrigine, 200mg is highest dose. Started for \"mood cycling\", psychiatrist didn't diagnose him with bipolar.    - perphenazine-amitriptyline 2-10mg. Started possibly for sleep/anxiety.  Pt does endorse hallucinations during periods of depression and jose raul, denies hallucinations when euthymic.     0924-3349 amitriptyline (without perphenazine)(dose unknown) - discontinued d/t urinary retention    2018- metoprolol started for tachycardia     2018 - nortriptyline started by neuro for migraine ppx.                                  Medical / Surgical History     Patient Active Problem List   Diagnosis     Generalized anxiety disorder     Migraine with aura, not intractable, without status migrainosus     Attention deficit hyperactivity disorder (ADHD), combined type     Bipolar disorder (H)       Past Surgical History:   Procedure Laterality Date     HEAD & NECK SURGERY       TONSILLECTOMY           Medical Review of Systems                                                                                                    2, 10     Completed ROS is documented in HPI     Allergy   Patient has no known allergies.   Current " Medications     Current Outpatient Medications   Medication Sig Dispense Refill     atomoxetine (STRATTERA) 40 MG capsule Take 1 capsule (40 mg) by mouth daily 30 capsule 0     lamoTRIgine (LAMICTAL) 100 MG tablet Take 2 tablets (200 mg) by mouth daily 60 tablet 0     lithium (ESKALITH CR/LITHOBID) 450 MG CR tablet Take 4 tablets (1,800 mg) by mouth daily 360 tablet 2     metoprolol succinate ER (TOPROL-XL) 50 MG 24 hr tablet Take 1 tablet (50 mg) by mouth daily 90 tablet 0      Vitals                                                                                                                        3, 3     There were no vitals taken for this visit.      Labs and Data       PHQ9 Today: not completed   PHQ-9 SCORE 12/9/2019   PHQ-9 Total Score 15       Diagnosis, Assessment & Plan                                                                            m2, h3     DSM-5 diagnoses:    -Bipolar 1 Disorder   -Generalized Anxiety Disorder with panic attacks, r/o panic disorder  -Nicotine use disorder  -ADHD    Alexandru Mitchell is a 21-year-old with a history of the diagnoses listed above.  In addition, he reports traumatic loss of family member to suicide at age 11, which precipitated worsening of MH symptoms, and symptoms of a trauma/stressor related disorder should be further assessed.  He has a genetic loading for bipolar disorder (maternal grandfather and great uncle).      Today, Alexandru reports an ongoing positive response to Strattera for ADHD symptoms.  No concern today for mood or psychosis symptoms.  Recent lithium labs reviewed, he will repeat in September.  Declines medication changes today.  He denies SI, intent or plan and risk of harm to self or others is low.      Bipolar disorder  Continue lithium 1800 mg at bedtime  Continue lamictal 200 mg daily     ADHD  Continue Strattera 40 mg daily     Insomnia   No longer using trazodone, did not find helpful  He will try melatonin if needed, advised to start with 1-3  mg at bedtime  May consider low dose seroquel in the future    Anxiety  Metoprolol originally prescribed for tachycardia, will defer to PCP for management.    Recommended CBT for anxiety    Nicotine use disorder  Declines NRT    Long term management of high risk medications  1/27/21: TSH  6/21/21: Li level (0.97), BMP.  Reviewed elevated glucose, not fasting, he would like to repeats as a fasting lab  Ordered today to complete Sept: Li level, BMP, TSH      RTC: 8-12 weeks or sooner if needed     CRISIS NUMBERS:   Provided routinely in AVS.      Treatment Risk Statement:  The patient understands the risks, benefits, adverse effects and alternatives. Agrees to treatment with the capacity to do so. No medical contraindications to treatment. Agrees to call clinic for any problems. The patient understands to call 911 or go to the nearest ED if life threatening or urgent symptoms occur.

## 2021-08-12 NOTE — PATIENT INSTRUCTIONS
Thank you for coming to the [facility] [place] [facility].    Lab Testing:  If you had lab testing today and your results are reassuring or normal they will be mailed to you or sent through [place] within 7 days.   If the lab tests need quick action we will call you with the results.  The phone number we will call with results is # [phone] (home) . If this is not the best number please call our clinic and change the number.    Medication Refills:  If you need any refills please call your pharmacy and they will contact us. Our fax number for refills is [phone]. Please allow three business for refill processing.   If you need to  your refill at a new pharmacy, please contact the new pharmacy directly. The new pharmacy will help you get your medications transferred.     Scheduling:  If you have any concerns about today's visit or wish to schedule another appointment please call our office during normal business hours [phone] (8-5:00 M-F)    Contact Us:  Please call [phone] during business hours (8-5:00 M-F).  If after clinic hours, or on the weekend, please call [phone].    Financial Assistance: [phone]  [phone]  [place] [facility]: [phone]  [phone]  Medical Records: [phone]    MENTAL HEALTH CRISIS NUMBERS:  [facility]:   [facility]: [phone]  Crisis Residence [facility]: [phone]   Walk-In Counseling Center [facility]: [phone]   COPE  [place] Mobile Team: [phone] (adults) [phone] (child)     [facility]:   [facility]: [phone]   Walk-in counseling [facility]: [phone]   Walk-in counseling [facility]: [phone]   Crisis Residence [facility]: [phone]   Urgent Care Adult Mental Health: [phone] Mobile team AND  crisis  line    Other Crisis Numbers:   National Suicide Prevention Lifeline: 889-281-ZAQZ (286-204-0930)  CRISIS TEXT LINE: Text to 689243 for any crisis 24/7; OR www.crisistextline.org   Poison Control Center: 7-727-018-2530  CHILD: [facility] needs assessment team: [phone]   Trans Lifeline: 1-571.309.9715  [place] [facility] Lifeline: 1-116.798.1703    For a medical emergency please call 911 or go to the nearest ER.         _____________________________________________    Again thank you for choosing [facility] [place] [facility] and please let us know how we can best partner with you to improve you and your family's health.    You may be receiving a survey regarding this appointment. We would love to have your feedback, both positive and negative. The survey is done by an external company, so your answers are anonymous.

## 2021-10-10 ENCOUNTER — HEALTH MAINTENANCE LETTER (OUTPATIENT)
Age: 21
End: 2021-10-10

## 2021-10-11 ENCOUNTER — MYC MEDICAL ADVICE (OUTPATIENT)
Dept: INTERNAL MEDICINE | Facility: CLINIC | Age: 21
End: 2021-10-11

## 2021-10-11 DIAGNOSIS — I10 ESSENTIAL HYPERTENSION: ICD-10-CM

## 2021-10-14 RX ORDER — METOPROLOL SUCCINATE 50 MG/1
50 TABLET, EXTENDED RELEASE ORAL DAILY
Qty: 90 TABLET | Refills: 0 | Status: SHIPPED | OUTPATIENT
Start: 2021-10-14 | End: 2022-01-27

## 2021-11-16 ENCOUNTER — TELEPHONE (OUTPATIENT)
Dept: PSYCHIATRY | Facility: CLINIC | Age: 21
End: 2021-11-16
Payer: COMMERCIAL

## 2021-11-16 ASSESSMENT — PATIENT HEALTH QUESTIONNAIRE - PHQ9: SUM OF ALL RESPONSES TO PHQ QUESTIONS 1-9: 7

## 2021-11-16 NOTE — TELEPHONE ENCOUNTER
On November 16, 2021, at 8:12 AM, writer called patient at mobile to confirm Virtual Visit. Writer unable to make contact with patient. Writer left detailed voice message for call back. [phone] left as call back number. CHELO Jimenez    On November 16, 2021, at 8:45 AM, writer called patient at mobile to confirm Virtual Visit. Writer unable to make contact with patient. A link to the video visit was sent to the patient's email address and mobile phone number. Jeffrey Deshpande, EMT

## 2021-11-17 ASSESSMENT — PATIENT HEALTH QUESTIONNAIRE - PHQ9: SUM OF ALL RESPONSES TO PHQ QUESTIONS 1-9: 7

## 2021-11-18 ENCOUNTER — VIRTUAL VISIT (OUTPATIENT)
Dept: PSYCHIATRY | Facility: CLINIC | Age: 21
End: 2021-11-18
Attending: NURSE PRACTITIONER
Payer: COMMERCIAL

## 2021-11-18 ENCOUNTER — TELEPHONE (OUTPATIENT)
Dept: PSYCHIATRY | Facility: CLINIC | Age: 21
End: 2021-11-18
Payer: COMMERCIAL

## 2021-11-18 DIAGNOSIS — F31.9 BIPOLAR AFFECTIVE DISORDER, REMISSION STATUS UNSPECIFIED (H): ICD-10-CM

## 2021-11-18 DIAGNOSIS — Z79.899 ENCOUNTER FOR LONG-TERM (CURRENT) USE OF MEDICATIONS: Primary | ICD-10-CM

## 2021-11-18 DIAGNOSIS — F39 MOOD DISORDER (H): ICD-10-CM

## 2021-11-18 PROCEDURE — 99214 OFFICE O/P EST MOD 30 MIN: CPT | Mod: 95 | Performed by: NURSE PRACTITIONER

## 2021-11-18 PROCEDURE — 90833 PSYTX W PT W E/M 30 MIN: CPT | Mod: 95 | Performed by: NURSE PRACTITIONER

## 2021-11-18 RX ORDER — LITHIUM CARBONATE 450 MG
1800 TABLET, EXTENDED RELEASE ORAL DAILY
Qty: 360 TABLET | Refills: 2 | Status: SHIPPED | OUTPATIENT
Start: 2021-11-18 | End: 2021-12-16

## 2021-11-18 RX ORDER — ATOMOXETINE 25 MG/1
50 CAPSULE ORAL DAILY
Qty: 60 CAPSULE | Refills: 2 | Status: SHIPPED | OUTPATIENT
Start: 2021-11-18 | End: 2021-12-16

## 2021-11-18 RX ORDER — LAMOTRIGINE 100 MG/1
200 TABLET ORAL DAILY
Qty: 60 TABLET | Refills: 2 | Status: SHIPPED | OUTPATIENT
Start: 2021-11-18 | End: 2021-12-16

## 2021-11-18 ASSESSMENT — PAIN SCALES - GENERAL: PAINLEVEL: NO PAIN (0)

## 2021-11-18 ASSESSMENT — PATIENT HEALTH QUESTIONNAIRE - PHQ9
SUM OF ALL RESPONSES TO PHQ QUESTIONS 1-9: 7
10. IF YOU CHECKED OFF ANY PROBLEMS, HOW DIFFICULT HAVE THESE PROBLEMS MADE IT FOR YOU TO DO YOUR WORK, TAKE CARE OF THINGS AT HOME, OR GET ALONG WITH OTHER PEOPLE: NOT DIFFICULT AT ALL
SUM OF ALL RESPONSES TO PHQ QUESTIONS 1-9: 7

## 2021-11-18 NOTE — PROGRESS NOTES
"Answers for HPI/ROS submitted by the patient on 11/18/2021  If you checked off any problems, how difficult have these problems made it for you to do your work, take care of things at home, or get along with other people?: Not difficult at all  PHQ9 TOTAL SCORE: 7    VIDEO VISIT  Alexandru Mitchell is a 21 year old patient that has consented to receive services via billable video visit.      The patient has been notified of following:   \"This video visit will be conducted via a call between you and your physician/provider. We have found that certain health care needs can be provided without the need for an in-person physical exam. This service lets us provide the care you need with a video conversation. If a prescription is necessary we can send it directly to your pharmacy. If lab work is needed we can place an order for that and you can then stop by our lab to have the test done at a later time. Insurers are generally covering virtual visits as they would in-office visits so billing should not be different than normal.  If for some reason you do get billed incorrectly, you should contact the billing office to correct it and that number is in the AVS .    Patient will join video visit via:  [email or web address] (Patient / guardian confirmed to join via [email or web address])    If patient attempts to join the video via [email or web address] at appointment start time, but is unable to, they would prefer that the provider send them a video invitation via:   Send to preferred e-mail: [email or web address]      How would patient like to obtain AVS?:  [email or web address]     Video- Visit Details  Type of service:  video visit for medication management  Time of service:    Date:  11/18/2021    Video Start Time:  12:55 PM      Video End Time:  1:36 PM    Reason for video visit:  Patient unable to travel due to Covid-19/services only offered via telehealth  Originating Site (patient location):  [facility]   Location- Patient's home  Distant Site (provider location):  Remote " "location  Mode of Communication:  Video Conference via [employer]  Consent:  Patient has given verbal consent for video visit?: Yes       Psychiatry Clinic Follow Up Visit (telehealth)                                    Alexandru Mitchell is a 21 year old male who prefers the name Alexandru and pronoun he, him, his.  Therapist: None   PCP: Jerrica Billingsley      History of Present Illness                                                                                4, 4     -Alexandru is a 21-year-old male with bipolar 1 disorder, RACHEL and panic attacks.  He was last seen by me on 8/12/21 at which time no medication changes were made.  Lithium labs ordered at last visit to be completed in Sept are not yet completed.   -He reports today that in the last few weeks he has noticed an increase in irritability, coinciding with daylight savings time.  Reports roommates and girlfriend have been struggling with negativity, which has impacted his mood.  Has also had some stressors related to coursework, upcoming graduation.  Recently moved into a house with 5 roommates, there is a lot of noise.             -Denies persistent depression or jose raul.  Denies death ideation or SI.   -He has noticed an improvement with his concentration on the Strattera overall. He still notices some symptoms of ADHD (difficulty following conversations, hype-rfocusing on certain tasks), and these are worse lately d/t increase in stress, having difficulty completing assignments and meeting deadlines.  -Reports sleep has become more \"erratic\" but is sleeping 7-9 hours per night. There is a lot of noise in his house which impacts his ability to fall asleep.  Often up later than he would like to be.     -No recent substance use.  Has been trying to cut back on nicotine, has had some success with this.  Using vaping products with less nicotine content.  Not currently interested in NRT.        Medication SE/ROS:  Has continued to drink 3-4 liters of water daily, now more " "consistently 4.  Urinates frequently throughout the day, 1x per hour, and wakes up 2x at night to use bathroom.  Denies n/v/d. Denies tremor, balance issues.  No change in sedation, energy level.      Current psychiatric medications  Lithium 1800 mg at bedtime   Lamictal 200 mg daily  Strattera 40 mg daily   Metroprolol 50 mg daily (managed by PCP)    Psychiatric Review of Symptoms:   Depression:  Denies  Elevated:  none currently   Psychosis:  none  Anxiety:  Denies      Psychiatric History (no change today)     Suicide Attempt:   #- 3 by overdose on OTC medications; no medical attention sought.  Most Recent- 16 year old  SIB- cutting and scratching   Mary Beth- first episode- possibly as young as 12 years old characterized by hypomania.  Most recent manic episode in 10/2019.    Psychosis- AH, paranoia  Only during manic episodes.      Social History  FINANCIAL SUPPORT- UNKNOWN       CHILDREN- None       EARLY HISTORY/ EDUCATION- Currently student at [employer] studying [employer] hx - maternal grandfather and great uncle with bipolar disorder.  Maternal great uncle  by suicide        Psychiatric Medication Trials (no change today)         - Propranolol by PCP for anxiety - not helpful     -2017, Prozac (dose unknown) mainly for anxiety. Pt reports little to no symptom reduction with Prozac. Does recall increased suicidality before and during Prozac (compared to now), but does not think Prozac worsened suicidality or irritability.       2017 established with psychiatrist, Ruben Fuentes in [place]:  During 2017 was started on:   - venlafaxine, possibly was up to 225mg daily, but decreased to 150mg after ~6 months d/t increased irritability and \"feeling numb, no emotion\".  Denies worsening SI, unsure if mood cycling worsened.   - lamotrigine, 200mg is highest dose. Started for \"mood cycling\", psychiatrist didn't diagnose him with bipolar.    - perphenazine-amitriptyline 2-10mg. Started possibly " for sleep/anxiety.  Pt does endorse hallucinations during periods of depression and jose raul, denies hallucinations when euthymic.     3460-4339 amitriptyline (without perphenazine)(dose unknown) - discontinued d/t urinary retention    2018- metoprolol started for tachycardia     2018 - nortriptyline started by neuro for migraine ppx.                                  Medical / Surgical History     Patient Active Problem List   Diagnosis     Generalized anxiety disorder     Migraine with aura, not intractable, without status migrainosus     Attention deficit hyperactivity disorder (ADHD), combined type     Bipolar disorder (H)       Past Surgical History:   Procedure Laterality Date     HEAD & NECK SURGERY       TONSILLECTOMY           Medical Review of Systems                                                                                                    2, 10     Completed ROS is documented in HPI     Allergy   Patient has no known allergies.   Current Medications     Current Outpatient Medications   Medication Sig Dispense Refill     atomoxetine (STRATTERA) 40 MG capsule Take 1 capsule (40 mg) by mouth daily 30 capsule 2     lamoTRIgine (LAMICTAL) 100 MG tablet Take 2 tablets (200 mg) by mouth daily 60 tablet 2     lithium (ESKALITH CR/LITHOBID) 450 MG CR tablet Take 4 tablets (1,800 mg) by mouth daily 360 tablet 2     metoprolol succinate ER (TOPROL-XL) 50 MG 24 hr tablet Take 1 tablet (50 mg) by mouth daily 90 tablet 0      Vitals                                                                                                                        3, 3     There were no vitals taken for this visit.      Labs and Data       PHQ9 Today: not completed   PHQ-9 SCORE 11/16/2021 11/16/2021 11/18/2021   PHQ-9 Total Score MyChart - 7 (Mild depression) 7 (Mild depression)   PHQ-9 Total Score 7 7 7       Diagnosis, Assessment & Plan                                                                            m2, h3      DSM-5 diagnoses:    -Bipolar 1 Disorder   -Generalized Anxiety Disorder with panic attacks, r/o panic disorder  -Nicotine use disorder  -ADHD    Alexandru Mitchell is a 21-year-old with a history of the diagnoses listed above.  In addition, he reports traumatic loss of family member to suicide at age 11, which precipitated worsening of MH symptoms, and symptoms of a trauma/stressor related disorder should be further assessed.  He has a genetic loading for bipolar disorder (maternal grandfather and great uncle).      Today, Alexandru reports an ongoing positive response to Strattera for ADHD symptoms however may benefit from higher dose.  No concern today for mood or psychosis symptoms. He agrees to complete lithium labs in the next week.  He denies SI, intent or plan and risk of harm to self or others is low.      Bipolar disorder  Continue lithium 1800 mg at bedtime  Continue lamictal 200 mg daily    ADHD  Increase Strattera to 50 mg daily     Insomnia   No longer using trazodone, did not find helpful  He will try melatonin if needed, advised to start with 1-3 mg at bedtime  May consider low dose seroquel in the future    Anxiety  Metoprolol originally prescribed for tachycardia, will defer to PCP for management.    Recommended CBT for anxiety    Nicotine use disorder  Declines NRT    Long term management of high risk medications  1/27/21: TSH  6/21/21: Li level (0.97), BMP.  Reviewed elevated glucose, not fasting, he would like to repeats as a fasting lab  Ordered last visit, not yet compleeted: Li level, BMP, TSH   Will refer to endocrinology for assessment of increased urination/thirst      RTC: 6 weeks or sooner if needed     CRISIS NUMBERS:   Provided routinely in AVS.      Treatment Risk Statement:  The patient understands the risks, benefits, adverse effects and alternatives. Agrees to treatment with the capacity to do so. No medical contraindications to treatment. Agrees to call clinic for any problems. The patient  understands to call 911 or go to the nearest ED if life threatening or urgent symptoms occur.     Psychiatry Individual Psychotherapy Note   Psychotherapy start time - 1:06 PM  Psychotherapy end time - 1:26 PM  Date last reviewed - 11/18/21  Subjective: This supportive psychotherapy session addressed issues related to goals of therapy and current psychosocial stressors.   Interactive complexity indicated? No  Plan: RTC in timeframe noted above  Psychotherapy services during this visit included myself and the patient.   Treatment Plan      SYMPTOMS; PROBLEMS   MEASURABLE GOALS;    FUNCTIONAL IMPROVEMENT / GAINS INTERVENTIONS DISCHARGE CRITERIA   Bipolar disorder       Prevent episodes of jose raul/depression  Maintain sleep schedule     Supportive / psychodynamic Sustained remission of symptoms

## 2021-11-18 NOTE — TELEPHONE ENCOUNTER
On November 18, 2021, at 12:32 PM, writer called patient at mobile to confirm Virtual Visit. Writer unable to make contact with patient. Writer left detailed voice message for call back. [phone] left as call back number. A link to the video visit was sent to the patient's mobile phone number. Jeffrey Deshpande, EMT

## 2021-11-18 NOTE — PATIENT INSTRUCTIONS
**For crisis resources, please see the information at the end of this document**     Patient Education      Thank you for coming to the [facility] M[facility] & [facility]DI[facility] [place] [facility].    Lab Testing:  If you had lab testing today and your results are reassuring or normal they will be mailed to you or sent through [employer] within 7 days. If the lab tests need quick action we will call you with the results. The phone number we will call with results is # [phone] (home) . If this is not the best number please call our clinic and change the number.    Medication Refills:  If you need any refills please call your pharmacy and they will contact us. Our fax number for refills is [phone]. Please allow three business for refill processing. If you need to  your refill at a new pharmacy, please contact the new pharmacy directly. The new pharmacy will help you get your medications transferred.     Scheduling:  If you have any concerns about today's visit or wish to schedule another appointment please call our office during normal business hours [phone] (8-5:00 M-F)    Contact Us:  Please call [phone] during business hours (8-5:00 M-F).  If after clinic hours, or on the weekend, please call  [phone].    Financial Assistance [phone]  [facility] Billing [phone]  Central Billing Office, [facility]: [phone]  [facility] Billing [phone]  Medical Records [phone]  [place] Patient Bill of Rights [email or web address]       MENTAL HEALTH CRISIS NUMBERS:  For a medical emergency please call  911 or go to the nearest ER.     [facility]:   [facility] -[phone]   Crisis Residence [facility] Residence -[phone]   Walk-In Counseling Center [facility] -[phone]   COPE 24/7 [place] Mobile Team -[phone] (adults)/[phone] (child)  CHILD: [facility] needs assessment  team - [phone]      [facility]:   [facility] - [phone]   Walk-in counseling [facility] - [phone]   Walk-in counseling [facility] - [phone]   Crisis Residence [facility] Residence - [phone]  Urgent Care Adult Mental Sjmfec-[phone] mobile unit/ 24/7 crisis line    National Crisis Numbers:   National Suicide Prevention Lifeline: 8-503-618-TALK (520-579-8647)  Poison Control Center - 2-716-073-3483  [employer]/resources for a list of additional resources (SOS)  Trans Lifeline a hotline for transgender people 1-743.977.1917  The [employer] Project a hotline for LGBT youth 1-887-131-1660  Crisis Text Line: For any crisis 24/7   To: 919189  see www.crisistextline.org  - IF MAKING A CALL FEELS TOO HARD, send a text!         Again thank you for choosing [facility] MENTAL HEALTH & ADDICTION [facility] and please let us know how we can best partner with you to improve you and your family's health.    You may be receiving a survey regarding this appointment. We would love to have your feedback, both positive and negative. The survey is done by an external company, so your answers are anonymous.

## 2021-12-04 ENCOUNTER — HEALTH MAINTENANCE LETTER (OUTPATIENT)
Age: 21
End: 2021-12-04

## 2021-12-16 ENCOUNTER — VIRTUAL VISIT (OUTPATIENT)
Dept: PSYCHIATRY | Facility: CLINIC | Age: 21
End: 2021-12-16
Attending: NURSE PRACTITIONER
Payer: COMMERCIAL

## 2021-12-16 ENCOUNTER — TELEPHONE (OUTPATIENT)
Dept: PSYCHIATRY | Facility: CLINIC | Age: 21
End: 2021-12-16
Payer: COMMERCIAL

## 2021-12-16 DIAGNOSIS — F31.9 BIPOLAR AFFECTIVE DISORDER, REMISSION STATUS UNSPECIFIED (H): ICD-10-CM

## 2021-12-16 DIAGNOSIS — Z79.899 LITHIUM USE: Primary | ICD-10-CM

## 2021-12-16 DIAGNOSIS — F39 MOOD DISORDER (H): ICD-10-CM

## 2021-12-16 PROCEDURE — 99214 OFFICE O/P EST MOD 30 MIN: CPT | Mod: 95 | Performed by: NURSE PRACTITIONER

## 2021-12-16 PROCEDURE — 90833 PSYTX W PT W E/M 30 MIN: CPT | Mod: 95 | Performed by: NURSE PRACTITIONER

## 2021-12-16 RX ORDER — LITHIUM CARBONATE 450 MG
1800 TABLET, EXTENDED RELEASE ORAL DAILY
Qty: 360 TABLET | Refills: 2 | Status: SHIPPED | OUTPATIENT
Start: 2021-12-16 | End: 2022-04-14

## 2021-12-16 RX ORDER — LAMOTRIGINE 100 MG/1
200 TABLET ORAL DAILY
Qty: 60 TABLET | Refills: 2 | Status: SHIPPED | OUTPATIENT
Start: 2021-12-16 | End: 2022-04-08

## 2021-12-16 RX ORDER — ATOMOXETINE 25 MG/1
50 CAPSULE ORAL DAILY
Qty: 60 CAPSULE | Refills: 2 | Status: SHIPPED | OUTPATIENT
Start: 2021-12-16 | End: 2022-04-08

## 2021-12-16 NOTE — PATIENT INSTRUCTIONS
-I have put in a referral to endocrinology due to your increased urination and needing to wake up at night to urinate.  If you do not hear from them in about a week, please call [phone] to follow up.     -Please get your lithium labs done within the next week.  If you end up getting these done in [place], you can send us the fax number to the clinic and we can forward the orders (if that's OK with the clinic).  The orders are lithium level, Basic Metabolic Panel, and TSH with T4 reflex.  You will need to have the results faxed back to our clinic at [phone].       INSTRUCTIONS FOR USE OF LITHIUM      DO:    - call clinic if you, or another provider, makes any medication changes  - call clinic if your use of ibuprofen (or similar) increases at all  - call clinic if - call clinic if you experience any symptom listed below  - LABS:   obtain all labs as directed,  labs are done every 6 months on an ongoing basis;                 lithium level is drawn 5 days after starting lithium and after dose changes;                  labs also include kidney and thyroid function;                 all labs are drawn between 10 and 14 hours after your last dose (12hrs is ideal)      DO NOT:    - stop this med abruptly  - use street drugs or alcohol while being treated with lithium  - increase use of ibuprofen (or similar) without calling the clinic      TRY TO AVOID:    - any use of ibuprofen (or similar);  MAY use acetaminophen (Tylenol) for pain  - excessive sweating  - excessive salt intake  - use of over the counter herbal remedies      FOOD:  take with or without food but AVOID excessive salt intake      COMMON ADVERSE EFFECTS: acne, weight gain, nausea, tremor, increased urination, increased thirst, sedation, loose stools, blurring of memory and concentration      CALL CLINIC URGENTLY or EMERGENCY ROOM:  if you have any concerns, especially the following...  - blurred vision  - heart palpitations  - ear ringing  - excessive  urination (especially at night)  OR  excessive thirst  - seizure  - kidney problems or any new medical problem  - thoughts of death or hurting yourself    - suspect Serotonin Syndrome:   confusion   tremor  severe headache  sweats  fever   funny feeling in muscles  need to pace / restlessness   severe nausea, vomiting  diarrhea        **For crisis resources, please see the information at the end of this document**   Patient Education    Thank you for coming to the [facility] MENTAL HEALTH & ADDICTION [place] [facility].    Lab Testing:  If you had lab testing today and your results are reassuring or normal they will be mailed to you or sent through [employer] within 7 days. If the lab tests need quick action we will call you with the results. The phone number we will call with results is # [phone] (home) . If this is not the best number please call our clinic and change the number.    Medication Refills:  If you need any refills please call your pharmacy and they will contact us. Our fax number for refills is [phone]. Please allow three business for refill processing. If you need to  your refill at a new pharmacy, please contact the new pharmacy directly. The new pharmacy will help you get your medications transferred.     Scheduling:  If you have any concerns about today's visit or wish to schedule another appointment please call our office during normal business hours [phone] (8-5:00 M-F)    Contact Us:  Please call [phone] during business hours (8-5:00 M-F).  If after clinic hours, or on the weekend, please call  [phone].    Financial Assistance [phone]  [facility] Billing [phone]  Central Billing Office, [facility]: [phone]  [place] Billing [phone]  Medical Records [phone]  [place] Patient Bill of Rights [email or web address]       MENTAL HEALTH CRISIS NUMBERS:  For a medical emergency please  call  911 or go to the nearest ER.     [facility]:   [facility] -[phone]   Crisis Residence \[facility] [place] [facility]\"" [facility] -[phone]   Walk-In Counseling Center \[facility] [place] [facility]\"" -[phone]   COPE 24/7 [place] Mobile Team -[phone] (adults)/[phone] (child)  CHILD: [facility] needs assessment team - [phone]      [facility]:   [facility] - [phone]   Walk-in counseling [facility] - [phone]   Walk-in counseling [facility] - [phone]   Crisis Residence [facility] Residence - [phone]  Urgent Care Adult Mental Hkeixn-[phone] mobile unit/ 24/7 crisis line    National Crisis Numbers:   National Suicide Prevention Lifeline: 7-053-736-TALK (035-628-9024)  Poison Control Center - 4-355-944-3482  [employer]/resources for a list of additional resources (SOS)  Trans Lifeline a hotline for transgender people 6-948-328-6925  The [employer] Project a hotline for LGBT youth 1-339.621.2119  Crisis Text Line: For any crisis 24/7   To: 268689  see www.crisistextline.org  - IF MAKING A CALL FEELS TOO HARD, send a text!         Again thank you for choosing [facility] MENTAL HEALTH & ADDICTION [place] [facility] and please let us know how we can best partner with you to improve you and your family's health.    You may be receiving a survey regarding this appointment. We would love to have your feedback, both positive and negative. The survey is done by an external company, so your answers are anonymous.

## 2021-12-16 NOTE — TELEPHONE ENCOUNTER
On December 16, 2021, at 12:40 PM, writer called patient at mobile to confirm Virtual Visit. Writer unable to make contact with patient. Writer left detailed voice message for call back. [phone] left as call back number. CHELO Jimenez    On December 16, 2021, at 1:13 PM, writer called patient at mobile to confirm Virtual Visit. Writer unable to make contact with patient. A link to the video visit was sent to the patient's email address and mobile phone number. Jeffrey Deshpande, EMT

## 2021-12-16 NOTE — PROGRESS NOTES
"  VIDEO VISIT  Alexandru Mitchell is a 21 year old patient that has consented to receive services via billable video visit.      The patient has been notified of following:   \"This video visit will be conducted via a call between you and your physician/provider. We have found that certain health care needs can be provided without the need for an in-person physical exam. This service lets us provide the care you need with a video conversation. If a prescription is necessary we can send it directly to your pharmacy. If lab work is needed we can place an order for that and you can then stop by our lab to have the test done at a later time. Insurers are generally covering virtual visits as they would in-office visits so billing should not be different than normal.  If for some reason you do get billed incorrectly, you should contact the billing office to correct it and that number is in the AVS .    Patient will join video visit via:  PayOrPass (Patient / guardian confirmed to join via PayOrPass)    If patient attempts to join the video via PayOrPass at appointment start time, but is unable to, they would prefer that the provider send them a video invitation via:   Send to preferred e-mail: [email or web address]      How would patient like to obtain AVS?:  PayOrPass     Video- Visit Details  Type of service:  video visit for medication management  Time of service:    Date:  12/16/2021    Video Start Time:  1:30 PM      Video End Time:  2:00 PM     Reason for video visit:  Patient unable to travel due to Covid-19/services only offered via telehealth  Originating Site (patient location):  [facility]   Location- Patient's home  Distant Site (provider location):  Remote location  Mode of Communication:  Video Conference via [employer]  Consent:  Patient has given verbal consent for video visit?: Yes       Psychiatry Clinic Follow Up Visit (telehealth)                                    Alexandru Mitchell is a 21 year old male who prefers the name " Alexandru and katie he, him, his.  Therapist: None   PCP: Jerrica Billingsley      Interim History                                                                             4, 4     -Alexandru is a 21-year-old male with bipolar 1 disorder, RACHEL and panic attacks.  He was last seen by me on 11/18/21 at which time Strattera dose was increased.  Lithium labs to be completed in Sept are not yet completed.   -Alexandru reports today that he has not noticed much of a change in focus with increase of Strattera dose.  However, he was able to successfully complete his semester and was previously struggling to complete coursework on time.    -Mood has continued to be irritable and he feels this is increased since his last visit.  Irritability is typically precipitated by interactions with others vs being persistently present.  Denies persistent depression or jose raul.  Denies death ideation or SI.   -Sleep has continued to be erratic.  For a period of time he was sleeping from 7 AM - 3 PM due to difficulty sleeping during normal hours with excessive noise in his house.  He has since corrected this somewhat to going to bed at 2AM and waking up at 10 AM.     -No recent substance use.  Has been trying to cut back on nicotine, has had some success with this.  Using vaping products with less nicotine content.  Not currently interested in NRT.        Medication SE/ROS:  Has continued to drink 3-4 liters of water daily, now more consistently 4.  Urinates frequently throughout the day, 1x per hour, and wakes up 2x at night to use bathroom.  Denies n/v/d. Denies tremor, balance issues.  No change in sedation, energy level.      Current psychiatric medications  Lithium 1800 mg at bedtime   Lamictal 200 mg daily  Strattera 50 mg daily   Metroprolol 50 mg daily (managed by PCP)    Psychiatric Review of Symptoms:   Depression:  Denies  Elevated:  none currently   Psychosis:  none  Anxiety:  Denies      Psychiatric History (no change today)     Suicide  "Attempt:   #- 3 by overdose on OTC medications; no medical attention sought.  Most Recent- 16 year old  SIB- cutting and scratching   Mary Beth- first episode- possibly as young as 12 years old characterized by hypomania.  Most recent manic episode in 10/2019.    Psychosis- AH, paranoia  Only during manic episodes.      Social History  FINANCIAL SUPPORT- UNKNOWN       CHILDREN- None       EARLY HISTORY/ EDUCATION- Currently student at [employer] studying [employer]    Family MH hx - maternal grandfather and great uncle with bipolar disorder.  Maternal great uncle  by suicide        Psychiatric Medication Trials (no change today)         - Propranolol by PCP for anxiety - not helpful     -2017, Prozac (dose unknown) mainly for anxiety. Pt reports little to no symptom reduction with Prozac. Does recall increased suicidality before and during Prozac (compared to now), but does not think Prozac worsened suicidality or irritability.       2017 established with psychiatrist, Ruben Fuentes in [place]:  During 2017 was started on:   - venlafaxine, possibly was up to 225mg daily, but decreased to 150mg after ~6 months d/t increased irritability and \"feeling numb, no emotion\".  Denies worsening SI, unsure if mood cycling worsened.   - lamotrigine, 200mg is highest dose. Started for \"mood cycling\", psychiatrist didn't diagnose him with bipolar.    - perphenazine-amitriptyline 2-10mg. Started possibly for sleep/anxiety.  Pt does endorse hallucinations during periods of depression and mary beth, denies hallucinations when euthymic.     4708-7608 amitriptyline (without perphenazine)(dose unknown) - discontinued d/t urinary retention    - metoprolol started for tachycardia      - nortriptyline started by neuro for migraine ppx.                                  Medical / Surgical History     Patient Active Problem List   Diagnosis     Generalized anxiety disorder     Migraine with aura, not intractable, without status " migrainosus     Attention deficit hyperactivity disorder (ADHD), combined type     Bipolar disorder (H)       Past Surgical History:   Procedure Laterality Date     HEAD & NECK SURGERY       TONSILLECTOMY           Medical Review of Systems                                                                                                    2, 10     Completed ROS is documented in HPI     Allergy   Patient has no known allergies.   Current Medications     Current Outpatient Medications   Medication Sig Dispense Refill     atomoxetine (STRATTERA) 25 MG capsule Take 2 capsules (50 mg) by mouth daily 60 capsule 2     lamoTRIgine (LAMICTAL) 100 MG tablet Take 2 tablets (200 mg) by mouth daily 60 tablet 2     lithium (ESKALITH CR/LITHOBID) 450 MG CR tablet Take 4 tablets (1,800 mg) by mouth daily 360 tablet 2     metoprolol succinate ER (TOPROL-XL) 50 MG 24 hr tablet Take 1 tablet (50 mg) by mouth daily 90 tablet 0      Vitals                                                                                                                        3, 3     There were no vitals taken for this visit.      Labs and Data       PHQ9 Today: not completed   PHQ-9 SCORE 11/16/2021 11/16/2021 11/18/2021   PHQ-9 Total Score MyChart - 7 (Mild depression) 7 (Mild depression)   PHQ-9 Total Score 7 7 7       Diagnosis, Assessment & Plan                                                                            m2, h3     DSM-5 diagnoses:    -Bipolar 1 Disorder   -Generalized Anxiety Disorder with panic attacks, r/o panic disorder  -Nicotine use disorder  -ADHD    Alexandru Mitchell is a 21-year-old with a history of the diagnoses listed above.  In addition, he reports traumatic loss of family member to suicide at age 11, which precipitated worsening of MH symptoms, and symptoms of a trauma/stressor related disorder should be further assessed.  He has a genetic loading for bipolar disorder (maternal grandfather and great uncle).      Today, Alexandru  reports some improvement in ADHD symptoms with increased dose of Strattera.  He may benefit from further dose titration but will defer until next semester.    No concern today for mood or psychosis symptoms. He agrees to complete lithium labs in the next week, he will complete these in [place] and fax results to our clinic.  He denies SI, intent or plan and risk of harm to self or others is low.      Bipolar disorder  Continue lithium 1800 mg at bedtime  Continue lamictal 200 mg daily    ADHD  Continue Strattera 50 mg daily    Insomnia   No longer using trazodone, did not find helpful  He will try melatonin if needed, advised to start with 1-3 mg at bedtime  May consider low dose seroquel in the future    Anxiety  Metoprolol originally prescribed for tachycardia, will defer to PCP for management.    Recommended CBT for anxiety    Nicotine use disorder  Declines NRT    Long term management of high risk medications  1/27/21: TSH  6/21/21: Li level (0.97), BMP.  Reviewed elevated glucose, not fasting, he would like to repeats as a fasting lab  Ordered last visit, not yet compleeted: Li level, BMP, TSH   Will refer to endocrinology for assessment of increased urination/thirst      RTC: 12 weeks or sooner if needed     CRISIS NUMBERS:   Provided routinely in AVS.      Treatment Risk Statement:  The patient understands the risks, benefits, adverse effects and alternatives. Agrees to treatment with the capacity to do so. No medical contraindications to treatment. Agrees to call clinic for any problems. The patient understands to call 911 or go to the nearest ED if life threatening or urgent symptoms occur.     Psychiatry Individual Psychotherapy Note   Psychotherapy start time - 1:30 PM  Psychotherapy end time - 1:50  PM  Date last reviewed - 12/16/2021    Subjective: This supportive psychotherapy session addressed issues related to goals of therapy and current psychosocial stressors.   Interactive complexity indicated?  No  Plan: RTC in timeframe noted above  Psychotherapy services during this visit included myself and the patient.   Treatment Plan      SYMPTOMS; PROBLEMS   MEASURABLE GOALS;    FUNCTIONAL IMPROVEMENT / GAINS INTERVENTIONS DISCHARGE CRITERIA   Bipolar disorder       Prevent episodes of jose raul/depression  Maintain sleep schedule     Supportive / psychodynamic Sustained remission of symptoms

## 2021-12-22 ENCOUNTER — TRANSFERRED RECORDS (OUTPATIENT)
Dept: HEALTH INFORMATION MANAGEMENT | Facility: CLINIC | Age: 21
End: 2021-12-22
Payer: COMMERCIAL

## 2021-12-23 ENCOUNTER — TELEPHONE (OUTPATIENT)
Dept: PSYCHIATRY | Facility: CLINIC | Age: 21
End: 2021-12-23
Payer: COMMERCIAL

## 2021-12-23 LAB
ANION GAP SERPL CALCULATED.3IONS-SCNC: 9 MMOL/L
BUN SERPL-MCNC: 15 MG/DL
CALCIUM SERPL-MCNC: 10.2 MG/DL
CHLORIDE SERPLBLD-SCNC: 104 MMOL/L
CO2 SERPL-SCNC: 26 MMOL/L
CREAT SERPL-MCNC: 1 MG/DL
EGFR AFRICAN AMERICAN - QUEST: 93.44
GLUCOSE SERPL-MCNC: 80 MG/DL (ref 65–115)
LITHIUM SERPL-SCNC: 1 MMOL/L (ref 0.6–1.2)
POTASSIUM SERPL-SCNC: 5.5 MMOL/L
SODIUM SERPL-SCNC: 139 MMOL/L
T4 FREE SERPL-MCNC: 1.17 NG/DL
TSH SERPL-ACNC: 2.07 MCU/ML

## 2022-01-22 DIAGNOSIS — I10 ESSENTIAL HYPERTENSION: ICD-10-CM

## 2022-01-27 RX ORDER — METOPROLOL SUCCINATE 50 MG/1
50 TABLET, EXTENDED RELEASE ORAL DAILY
Qty: 30 TABLET | Refills: 0 | Status: SHIPPED | OUTPATIENT
Start: 2022-01-27 | End: 2022-04-07

## 2022-01-27 NOTE — TELEPHONE ENCOUNTER
METOPROLOL SUCCINATE ER 50MG   Request sent to pharmacy  Thank-you, Samantha JANE RN Medication Refill Team

## 2022-01-28 NOTE — TELEPHONE ENCOUNTER
Former [facility] patient. Left message with [facility] number to call to schedule re-establish care and annual physical for ongoing meds refill.

## 2022-02-07 ENCOUNTER — MYC MEDICAL ADVICE (OUTPATIENT)
Dept: FAMILY MEDICINE | Facility: CLINIC | Age: 22
End: 2022-02-07
Payer: COMMERCIAL

## 2022-03-21 ENCOUNTER — LAB (OUTPATIENT)
Dept: LAB | Facility: CLINIC | Age: 22
End: 2022-03-21

## 2022-03-21 ENCOUNTER — OFFICE VISIT (OUTPATIENT)
Dept: FAMILY MEDICINE | Facility: CLINIC | Age: 22
End: 2022-03-21
Payer: COMMERCIAL

## 2022-03-21 VITALS
WEIGHT: 230.5 LBS | DIASTOLIC BLOOD PRESSURE: 86 MMHG | TEMPERATURE: 98.6 F | OXYGEN SATURATION: 100 % | HEIGHT: 70 IN | SYSTOLIC BLOOD PRESSURE: 132 MMHG | BODY MASS INDEX: 33 KG/M2 | HEART RATE: 94 BPM

## 2022-03-21 DIAGNOSIS — F31.9 BIPOLAR AFFECTIVE DISORDER, REMISSION STATUS UNSPECIFIED (H): ICD-10-CM

## 2022-03-21 DIAGNOSIS — F41.1 GENERALIZED ANXIETY DISORDER: ICD-10-CM

## 2022-03-21 DIAGNOSIS — T88.7XXA MEDICATION SIDE EFFECTS: ICD-10-CM

## 2022-03-21 DIAGNOSIS — Z00.00 ROUTINE GENERAL MEDICAL EXAMINATION AT A HEALTH CARE FACILITY: Primary | ICD-10-CM

## 2022-03-21 DIAGNOSIS — Z00.00 ROUTINE GENERAL MEDICAL EXAMINATION AT A HEALTH CARE FACILITY: ICD-10-CM

## 2022-03-21 DIAGNOSIS — Z11.59 NEED FOR HEPATITIS C SCREENING TEST: Primary | ICD-10-CM

## 2022-03-21 LAB
ALBUMIN SERPL-MCNC: 4.2 G/DL (ref 3.4–5)
ALP SERPL-CCNC: 60 U/L (ref 40–150)
ALT SERPL W P-5'-P-CCNC: 66 U/L (ref 0–70)
ANION GAP SERPL CALCULATED.3IONS-SCNC: 8 MMOL/L (ref 3–14)
AST SERPL W P-5'-P-CCNC: 22 U/L (ref 0–45)
BILIRUB SERPL-MCNC: 0.3 MG/DL (ref 0.2–1.3)
BUN SERPL-MCNC: 9 MG/DL (ref 7–30)
CALCIUM SERPL-MCNC: 10 MG/DL (ref 8.5–10.1)
CHLORIDE BLD-SCNC: 106 MMOL/L (ref 94–109)
CO2 SERPL-SCNC: 24 MMOL/L (ref 20–32)
CREAT SERPL-MCNC: 0.96 MG/DL (ref 0.66–1.25)
ERYTHROCYTE [DISTWIDTH] IN BLOOD BY AUTOMATED COUNT: 13.6 % (ref 10–15)
GFR SERPL CREATININE-BSD FRML MDRD: >90 ML/MIN/1.73M2
GLUCOSE BLD-MCNC: 106 MG/DL (ref 70–99)
HCT VFR BLD AUTO: 45.1 % (ref 40–53)
HCV AB SERPL QL IA: NONREACTIVE
HGB BLD-MCNC: 15.2 G/DL (ref 13.3–17.7)
LITHIUM SERPL-SCNC: 1 MMOL/L
MCH RBC QN AUTO: 28.2 PG (ref 26.5–33)
MCHC RBC AUTO-ENTMCNC: 33.7 G/DL (ref 31.5–36.5)
MCV RBC AUTO: 84 FL (ref 78–100)
PLATELET # BLD AUTO: 272 10E3/UL (ref 150–450)
POTASSIUM BLD-SCNC: 3.5 MMOL/L (ref 3.4–5.3)
PROT SERPL-MCNC: 7.6 G/DL (ref 6.8–8.8)
RBC # BLD AUTO: 5.39 10E6/UL (ref 4.4–5.9)
SODIUM SERPL-SCNC: 138 MMOL/L (ref 133–144)
TSH SERPL DL<=0.005 MIU/L-ACNC: 3.48 MU/L (ref 0.4–4)
WBC # BLD AUTO: 9.6 10E3/UL (ref 4–11)

## 2022-03-21 PROCEDURE — 85027 COMPLETE CBC AUTOMATED: CPT

## 2022-03-21 PROCEDURE — 99395 PREV VISIT EST AGE 18-39: CPT | Performed by: NURSE PRACTITIONER

## 2022-03-21 PROCEDURE — 93000 ELECTROCARDIOGRAM COMPLETE: CPT | Performed by: NURSE PRACTITIONER

## 2022-03-21 PROCEDURE — 91306 COVID-19,PF,MODERNA (18+ YRS BOOSTER .25ML): CPT | Performed by: NURSE PRACTITIONER

## 2022-03-21 PROCEDURE — 0064A COVID-19,PF,MODERNA (18+ YRS BOOSTER .25ML): CPT | Performed by: NURSE PRACTITIONER

## 2022-03-21 PROCEDURE — 99214 OFFICE O/P EST MOD 30 MIN: CPT | Mod: 25 | Performed by: NURSE PRACTITIONER

## 2022-03-21 PROCEDURE — 80053 COMPREHEN METABOLIC PANEL: CPT

## 2022-03-21 PROCEDURE — 36415 COLL VENOUS BLD VENIPUNCTURE: CPT

## 2022-03-21 PROCEDURE — 86803 HEPATITIS C AB TEST: CPT

## 2022-03-21 PROCEDURE — 84443 ASSAY THYROID STIM HORMONE: CPT

## 2022-03-21 PROCEDURE — 80178 ASSAY OF LITHIUM: CPT

## 2022-03-21 ASSESSMENT — PATIENT HEALTH QUESTIONNAIRE - PHQ9
SUM OF ALL RESPONSES TO PHQ QUESTIONS 1-9: 12
SUM OF ALL RESPONSES TO PHQ QUESTIONS 1-9: 12
10. IF YOU CHECKED OFF ANY PROBLEMS, HOW DIFFICULT HAVE THESE PROBLEMS MADE IT FOR YOU TO DO YOUR WORK, TAKE CARE OF THINGS AT HOME, OR GET ALONG WITH OTHER PEOPLE: SOMEWHAT DIFFICULT

## 2022-03-21 NOTE — PATIENT INSTRUCTIONS
- Blood work ordered.   -Call clinic with any questions or concerns.       Preventive Health Recommendations  Male Ages 21 - 25     Yearly exam:             See your health care provider every year in order to  o   Review health changes.   o   Discuss preventive care.    o   Review your medicines if your doctor has prescribed any.    You should be tested each year for STDs (sexually transmitted diseases).     Talk to your provider about cholesterol testing.      If you are at risk for diabetes, you should have a diabetes test (fasting glucose).    Shots: Get a flu shot each year. Get a tetanus shot every 10 years.     Nutrition:    Eat at least 5 servings of fruits and vegetables daily.     Eat whole-grain bread, whole-wheat pasta and brown rice instead of white grains and rice.     Get adequate calcium and Vitamin D.     Lifestyle    Exercise for at least 150 minutes a week (30 minutes a day, 5 days a week). This will help you control your weight and prevent disease.     Limit alcohol to one drink per day.     No smoking.     Wear sunscreen to prevent skin cancer.     See your dentist every six months for an exam and cleaning.

## 2022-03-21 NOTE — PROGRESS NOTES
A    SUBJECTIVE:   CC: Alexandru Mitchell is an 21 year old male who presents for preventative health visit.       Patient has been advised of split billing requirements and indicates understanding: Yes  Healthy Habits:     Getting at least 3 servings of Calcium per day:  Yes    Bi-annual eye exam:  Yes    Dental care twice a year:  NO    Sleep apnea or symptoms of sleep apnea:  None    Diet:  Other    Frequency of exercise:  4-5 days/week    Duration of exercise:  15-30 minutes    Taking medications regularly:  Yes    Medication side effects:  Other    PHQ-2 Total Score: 3    Additional concerns today:  No    nswers for HPI/ROS submitted by the patient on 3/21/2022  If you checked off any problems, how difficult have these problems made it for you to do your work, take care of things at home, or get along with other people?: Somewhat difficult  PHQ9 TOTAL SCORE: 12  Frequency of exercise:: 4-5 days/week  Getting at least 3 servings of Calcium per day:: Yes  Diet:: Other  Taking medications regularly:: Yes  Medication side effects:: Other  Bi-annual eye exam:: Yes  Dental care twice a year:: NO  Sleep apnea or symptoms of sleep apnea:: None  Additional concerns today:: No  Duration of exercise:: 15-30 minutes    Lives with 5 other guys- roommates.   Good appetite- his diet varies due to his Sikh. Some walking daily.     Today's PHQ-2 Score:   PHQ-2 ( 1999 Pfizer) 3/21/2022   Q1: Little interest or pleasure in doing things 2   Q2: Feeling down, depressed or hopeless 1   PHQ-2 Score 3   PHQ-2 Total Score (12-17 Years)- Positive if 3 or more points; Administer PHQ-A if positive -   Q1: Little interest or pleasure in doing things More than half the days   Q2: Feeling down, depressed or hopeless Several days   PHQ-2 Score 3       Abuse: Current or Past(Physical, Sexual or Emotional)- No  Do you feel safe in your environment? Yes    Have you ever done Advance Care Planning? (For example, a Health Directive, POLST, or a  discussion with a medical provider or your loved ones about your wishes): No, advance care planning information given to patient to review.  Patient declined advance care planning discussion at this time.    Social History     Tobacco Use     Smoking status: Former Smoker     Smokeless tobacco: Former User   Substance Use Topics     Alcohol use: Yes     Alcohol/week: 1.0 standard drink     Types: 1 Cans of beer per week     If you drink alcohol do you typically have >3 drinks per day or >7 drinks per week? No    Alcohol Use 3/21/2022   Prescreen: >3 drinks/day or >7 drinks/week? No       Denies tobacco or cannabis use.     Currently in school full-time. Will hopefully move back to [place] around August.     No recent dental or eye care in the past 1.5 years, will call and schedule.     Reports mental health has been stable. Needs to follow-up with psychiatry.     Last PSA: No results found for: PSA    Reviewed orders with patient. Reviewed health maintenance and updated orders accordingly - Yes  Lab work is in process  Labs reviewed in EPIC    Reviewed and updated as needed this visit by Provider                 Past Medical History:   Diagnosis Date     Developmental CNS abnormality (H)     as child, emotional lability, over focusing on minor details, distractibility, difficult to reassure, overly fascinated with subjects, social skills dificulty. . .     Generalized anxiety disorder      Migraine with aura, not intractable, without status migrainosus      Obstructive sleep apnea      Premature birth     pre patient report, at 8 months gestation     Social anxiety disorder      Tremor       Past Surgical History:   Procedure Laterality Date     HEAD & NECK SURGERY       TONSILLECTOMY         Review of Systems  CONSTITUTIONAL: NEGATIVE for fever, chills, change in weight  INTEGUMENTARY/SKIN: NEGATIVE for worrisome rashes, moles or lesions  EYES: NEGATIVE for vision changes or irritation  ENT: NEGATIVE for ear, mouth  "and throat problems  RESP: NEGATIVE for significant cough or SOB  CV: NEGATIVE for chest pain, palpitations or peripheral edema  GI: NEGATIVE for nausea, abdominal pain, heartburn, or change in bowel habits   male: negative for dysuria, hematuria, decreased urinary stream, erectile dysfunction, urethral discharge- Medication related.   MUSCULOSKELETAL: NEGATIVE for significant arthralgias or myalgia  NEURO: NEGATIVE for weakness, dizziness or paresthesias  PSYCHIATRIC: NEGATIVE for changes in mood or affect    OBJECTIVE:   /86   Pulse 94   Temp 98.6  F (37  C)   Ht 1.778 m (5' 10\")   Wt 104.6 kg (230 lb 8 oz)   SpO2 100%   BMI 33.07 kg/m      Physical Exam  GENERAL: healthy, alert and no distress  EYES: Eyes grossly normal to inspection, PERRL and conjunctivae and sclerae normal  HENT: ear canals and TM's normal, nose and mouth without ulcers or lesions  NECK: no adenopathy, no asymmetry, masses, or scars and thyroid normal to palpation  RESP: lungs clear to auscultation - no rales, rhonchi or wheezes  CV: regular rate and rhythm, normal S1 S2, no S3 or S4, no murmur, click or rub, no peripheral edema and peripheral pulses strong  ABDOMEN: soft, nontender, no hepatosplenomegaly, no masses and bowel sounds normal  MS: no gross musculoskeletal defects noted, no edema  SKIN: no suspicious lesions or rashes  NEURO: Normal strength and tone, mentation intact and speech normal  PSYCH: mentation appears normal, affect normal/bright    Diagnostic Test Results:  Pending.     ASSESSMENT/PLAN:   Routine general medical examination at a health care facility  Overall healthy.   - EKG 12-lead complete w/read - Clinics  - Comprehensive metabolic panel (BMP + Alb, Alk Phos, ALT, AST, Total. Bili, TP); Future  - CBC with platelets; Future  - COVID-19,PF,MODERNA (18+ YRS BOOSTER .25ML)  - REVIEW OF HEALTH MAINTENANCE PROTOCOL ORDERS  - TSH with free T4 reflex; Future    Medication side effects  Bipolar affective " "disorder, remission status unspecified (H)  Generalized anxiety disorder  Reports stable mood. Potassium levels have been high, will plan to recheck. EKG overdue- done today.   - Discussed normal results with patient in the clinic.   - Call and schedule with psychiatry.   - Lithium level; Future      COUNSELING:   Reviewed preventive health counseling, as reflected in patient instructions       Regular exercise       Healthy diet/nutrition       Vision screening       Immunizations    Vaccinated for: Covid Booster.       Estimated body mass index is 34.03 kg/m  as calculated from the following:    Height as of 12/9/19: 1.803 m (5' 11\").    Weight as of 10/22/20: 110.7 kg (244 lb).     Weight management plan: Discussed healthy diet and exercise guidelines    He reports that he has quit smoking. He has quit using smokeless tobacco.    Roomed by : EBONY Saldana    Patient Instructions   - Blood work ordered.   -Call clinic with any questions or concerns.       Preventive Health Recommendations  Male Ages 21 - 25     Yearly exam:             See your health care provider every year in order to  o   Review health changes.   o   Discuss preventive care.    o   Review your medicines if your doctor has prescribed any.    You should be tested each year for STDs (sexually transmitted diseases).     Talk to your provider about cholesterol testing.      If you are at risk for diabetes, you should have a diabetes test (fasting glucose).    Shots: Get a flu shot each year. Get a tetanus shot every 10 years.     Nutrition:    Eat at least 5 servings of fruits and vegetables daily.     Eat whole-grain bread, whole-wheat pasta and brown rice instead of white grains and rice.     Get adequate calcium and Vitamin D.     Lifestyle    Exercise for at least 150 minutes a week (30 minutes a day, 5 days a week). This will help you control your weight and prevent disease.     Limit alcohol to one drink per day.     No smoking. "     Wear sunscreen to prevent skin cancer.     See your dentist every six months for an exam and cleaning.       Counseling Resources:  ATP IV Guidelines  Pooled Cohorts Equation Calculator  FRAX Risk Assessment  ICSI Preventive Guidelines  Dietary Guidelines for Americans, 2010  USDA's MyPlate  ASA Prophylaxis  Lung CA Screening    ZO Wooten CNP  [facility]

## 2022-03-22 ASSESSMENT — PATIENT HEALTH QUESTIONNAIRE - PHQ9: SUM OF ALL RESPONSES TO PHQ QUESTIONS 1-9: 12

## 2022-04-07 DIAGNOSIS — F39 MOOD DISORDER (H): ICD-10-CM

## 2022-04-07 RX ORDER — ATOMOXETINE 40 MG/1
CAPSULE ORAL
Qty: 30 CAPSULE | Refills: 2 | OUTPATIENT
Start: 2022-04-07

## 2022-04-07 RX ORDER — ATOMOXETINE 10 MG/1
CAPSULE ORAL
Qty: 30 CAPSULE | Refills: 2 | OUTPATIENT
Start: 2022-04-07

## 2022-04-07 NOTE — CONFIDENTIAL NOTE
lamoTRIgine (LAMICTAL) 100 MG  Last refilled: 12/16/21  Qty: 60:2    Last seen: 12/16/21  RTC: 3 MOS  Cancel: 3/17/22  No-show: 0  Next appt: 4/14/22  Refill pended and routed to the provider for review/determination due to  Pt outside of RTC timeframe WITH CANCEL X1  * LABS

## 2022-04-08 ENCOUNTER — MYC MEDICAL ADVICE (OUTPATIENT)
Dept: PSYCHIATRY | Facility: CLINIC | Age: 22
End: 2022-04-08
Payer: COMMERCIAL

## 2022-04-08 DIAGNOSIS — F39 MOOD DISORDER (H): ICD-10-CM

## 2022-04-08 RX ORDER — LAMOTRIGINE 100 MG/1
TABLET ORAL
Qty: 60 TABLET | Refills: 2 | Status: SHIPPED | OUTPATIENT
Start: 2022-04-08 | End: 2022-04-14

## 2022-04-08 RX ORDER — ATOMOXETINE 25 MG/1
50 CAPSULE ORAL DAILY
Qty: 60 CAPSULE | Refills: 2 | Status: SHIPPED | OUTPATIENT
Start: 2022-04-08 | End: 2022-04-14

## 2022-04-08 NOTE — TELEPHONE ENCOUNTER
Last seen: 12/16/21  RTC: 12 weeks or sooner if needed   Cancel: 1 - Provider initiated  No-show: 0  Next appt: 4/14/22    Incoming refill from patient via phone    Medication requested: atomoxetine (STRATTERA) 25 MG capsule  Directions: Sig - Route: Take 2 capsules (50 mg) by mouth daily - Oral  Qty: 60:2  Last refilled: 12/27/21    Medication refill pended and routed to provider

## 2022-04-08 NOTE — TELEPHONE ENCOUNTER
----- Message from Jennifer Jacobo RN sent at 4/8/2022  9:20 AM CDT -----  Regarding: FW: medication refills  Contact: [phone]    ----- Message -----  From: Ermelinda Palmer  Sent: 4/8/2022   8:54 AM CDT  To: Psychiatry Nurse-[facility]  Subject: medication refills                               [facility]    Phone Message    May a detailed message be left on voicemail: no     Reason for Call: Medication Refill Request    Has the patient contacted the pharmacy for the refill? Yes   Name of medication being requested: Atomoxetine and LamoTrigine  Provider who prescribed the medication: [facility]  Pharmacy: [facility]. The patient stated that this pharmacy told him to call the clinic regarding his refills, he was unsure why the pharmacy told him to call.         Action Taken: Other: nurse pool    Travel Screening: Not Applicable

## 2022-04-14 ENCOUNTER — VIRTUAL VISIT (OUTPATIENT)
Dept: PSYCHIATRY | Facility: CLINIC | Age: 22
End: 2022-04-14
Attending: NURSE PRACTITIONER
Payer: COMMERCIAL

## 2022-04-14 DIAGNOSIS — F39 MOOD DISORDER (H): ICD-10-CM

## 2022-04-14 DIAGNOSIS — F31.9 BIPOLAR AFFECTIVE DISORDER, REMISSION STATUS UNSPECIFIED (H): ICD-10-CM

## 2022-04-14 DIAGNOSIS — R35.1 EXCESSIVE URINATION AT NIGHT: Primary | ICD-10-CM

## 2022-04-14 PROCEDURE — 99214 OFFICE O/P EST MOD 30 MIN: CPT | Mod: 95 | Performed by: NURSE PRACTITIONER

## 2022-04-14 PROCEDURE — 90833 PSYTX W PT W E/M 30 MIN: CPT | Mod: 95 | Performed by: NURSE PRACTITIONER

## 2022-04-14 RX ORDER — LAMOTRIGINE 100 MG/1
TABLET ORAL
Qty: 60 TABLET | Refills: 2 | Status: SHIPPED | OUTPATIENT
Start: 2022-04-14 | End: 2022-07-07

## 2022-04-14 RX ORDER — ATOMOXETINE 60 MG/1
60 CAPSULE ORAL DAILY
Qty: 30 CAPSULE | Refills: 2 | Status: SHIPPED | OUTPATIENT
Start: 2022-04-14 | End: 2022-07-07

## 2022-04-14 RX ORDER — LITHIUM CARBONATE 450 MG
1800 TABLET, EXTENDED RELEASE ORAL DAILY
Qty: 360 TABLET | Refills: 2 | Status: SHIPPED | OUTPATIENT
Start: 2022-04-14

## 2022-04-14 ASSESSMENT — ANXIETY QUESTIONNAIRES
5. BEING SO RESTLESS THAT IT IS HARD TO SIT STILL: SEVERAL DAYS
7. FEELING AFRAID AS IF SOMETHING AWFUL MIGHT HAPPEN: NOT AT ALL
GAD7 TOTAL SCORE: 13
GAD7 TOTAL SCORE: 13
4. TROUBLE RELAXING: NEARLY EVERY DAY
2. NOT BEING ABLE TO STOP OR CONTROL WORRYING: MORE THAN HALF THE DAYS
GAD7 TOTAL SCORE: 13
7. FEELING AFRAID AS IF SOMETHING AWFUL MIGHT HAPPEN: NOT AT ALL
3. WORRYING TOO MUCH ABOUT DIFFERENT THINGS: MORE THAN HALF THE DAYS
1. FEELING NERVOUS, ANXIOUS, OR ON EDGE: MORE THAN HALF THE DAYS
6. BECOMING EASILY ANNOYED OR IRRITABLE: NEARLY EVERY DAY

## 2022-04-14 NOTE — PROGRESS NOTES
"  VIDEO VISIT  Alexandru Mitchell is a 22 year old patient that has consented to receive services via billable video visit.      The patient has been notified of following:   \"This video visit will be conducted via a call between you and your physician/provider. We have found that certain health care needs can be provided without the need for an in-person physical exam. This service lets us provide the care you need with a video conversation. If a prescription is necessary we can send it directly to your pharmacy. If lab work is needed we can place an order for that and you can then stop by our lab to have the test done at a later time. Insurers are generally covering virtual visits as they would in-office visits so billing should not be different than normal.  If for some reason you do get billed incorrectly, you should contact the billing office to correct it and that number is in the AVS .    Patient will join video visit via:  [employer] (Patient / guardian confirmed to join via J-Kan)    If patient attempts to join the video via J-Kan at appointment start time, but is unable to, they would prefer that the provider send them a video invitation via:   Send to preferred e-mail: [email or web address]      How would patient like to obtain AVS?:  J-Kan     Video- Visit Details  Type of service:  video visit for medication management  Time of service:    Date:  04/14/2022    Video Start Time: 3:20 PM      Video End Time:  3:55 PM     Reason for video visit:  Patient unable to travel due to Covid-19/services only offered via telehealth  Originating Site (patient location):  [facility] [place]   Location- Patient's home  Distant Site (provider location):  [facility]  Mode of Communication:  Video Conference via [employer]  Consent:  Patient has given verbal consent for video visit?: Yes       Psychiatry Clinic Follow Up Visit (telehealth)                                    Alexandru Mitchell is a 22 year old male who prefers the " name Alexandru and pronoun he, him, his.  Therapist: None   PCP: Jerrica Billingsley      Interim History                                                                             4, 4     -Alexandru is a 22-year-old male with bipolar 1 disorder, RACHEL and panic attacks.  He was last seen by me on 12/16/21 at which time no medication changes were made.  [place] labs completed 3/21/22, lithium level of 1.0.  Potassium also normal, he had previously followed up with a potasium level in [place] after elevated level in December.  -He reports today that he has been doing well overall.  No recent episodes of persistent depression or of mood elevations.  Denies death ideation or SI.  Has been experiencing irritability in response to daily stressors.  Sleep has improved with use of better noise protector headphones, however is still staying up later than he would like.  He is typically going to bed at 3AM and waking up at 10 AM.    -After graduation he will be moving back to [place].  Girlfriend is going to be moving with him.  Plans to move in July.  Prior to this he will be going on a trip to the [place] to meet his partner's family.    -He reports overall improvement in ADHD symptoms since starting Strattera but continued symptoms.  Continues to have difficulty with distraction, procrastination, pacing, and low motivation for tasks that require sustained attention.    -Alexandru reports today that he has not noticed much of a change in focus with increase of Strattera dose.  However, he was able to successfully complete his semester and was previously struggling to complete coursework on time.   -Has been drinking 1 beer every other day.  No other substance use.  Has reduced nicotine use, vaping.  Not currently interested in NRT.        Medication SE/ROS:  He reports improvement in excessive urination.  Did not follow up with endocrinologist.  He is drinking 4-5 liters of water per day.  Urinates every 1-2 hours during the day and wakes up 1x per  "night to use the bathroom.  Denies n/v/d. Denies tremor, balance issues.  No change in sedation, energy level.      Current psychiatric medications  Lithium 1800 mg at bedtime   Lamictal 200 mg daily  Strattera 50 mg daily   Metroprolol 50 mg daily (managed by PCP)    Psychiatric Review of Symptoms:   Depression:  Denies  Elevated:  none currently   Psychosis:  none  Anxiety:  Denies      Psychiatric History (no change today)     Suicide Attempt:   #- 3 by overdose on OTC medications; no medical attention sought.  Most Recent- 16 year old  SIB- cutting and scratching   Mary Beth- first episode- possibly as young as 12 years old characterized by hypomania.  Most recent manic episode in 10/2019.    Psychosis- AH, paranoia  Only during manic episodes.      Social History  FINANCIAL SUPPORT- UNKNOWN       CHILDREN- None       EARLY HISTORY/ EDUCATION- Currently student at [employer] studying [employer] hx - maternal grandfather and great uncle with bipolar disorder.  Maternal great uncle  by suicide        Psychiatric Medication Trials (no change today)         - Propranolol by PCP for anxiety - not helpful     -2017, Prozac (dose unknown) mainly for anxiety. Pt reports little to no symptom reduction with Prozac. Does recall increased suicidality before and during Prozac (compared to now), but does not think Prozac worsened suicidality or irritability.       2017 established with psychiatrist, Ruben Fuentes in [place]:  During 2017 was started on:   - venlafaxine, possibly was up to 225mg daily, but decreased to 150mg after ~6 months d/t increased irritability and \"feeling numb, no emotion\".  Denies worsening SI, unsure if mood cycling worsened.   - lamotrigine, 200mg is highest dose. Started for \"mood cycling\", psychiatrist didn't diagnose him with bipolar.    - perphenazine-amitriptyline 2-10mg. Started possibly for sleep/anxiety.  Pt does endorse hallucinations during periods of depression and " jose raul, denies hallucinations when euthymic.     6010-7798 amitriptyline (without perphenazine)(dose unknown) - discontinued d/t urinary retention    2018- metoprolol started for tachycardia     2018 - nortriptyline started by neuro for migraine ppx.                                  Medical / Surgical History     Patient Active Problem List   Diagnosis     Generalized anxiety disorder     Migraine with aura, not intractable, without status migrainosus     Attention deficit hyperactivity disorder (ADHD), combined type     Bipolar disorder (H)       Past Surgical History:   Procedure Laterality Date     HEAD & NECK SURGERY       TONSILLECTOMY           Medical Review of Systems                                                                                                    2, 10     Completed ROS is documented in HPI     Allergy   Patient has no known allergies.   Current Medications     Current Outpatient Medications   Medication Sig Dispense Refill     atomoxetine (STRATTERA) 25 MG capsule Take 2 capsules (50 mg) by mouth daily 60 capsule 2     lamoTRIgine (LAMICTAL) 100 MG tablet TAKE TWO TABLETS (200MG) BY MOUTH ONCE DAILY 60 tablet 2     lithium (ESKALITH CR/LITHOBID) 450 MG CR tablet Take 4 tablets (1,800 mg) by mouth daily 360 tablet 2     metoprolol succinate ER (TOPROL-XL) 50 MG 24 hr tablet Take 1 tablet (50 mg) by mouth daily 90 tablet 3      Vitals                                                                                                                        3, 3     There were no vitals taken for this visit.      Labs and Data       PHQ9 Today: not completed   PHQ-9 SCORE 11/16/2021 11/18/2021 3/21/2022   PHQ-9 Total Score MyChart 7 (Mild depression) 7 (Mild depression) 12 (Moderate depression)   PHQ-9 Total Score 7 7 12       Diagnosis, Assessment & Plan                                                                            m2, h3     DSM-5 diagnoses:    -Bipolar 1 Disorder   -Generalized  Anxiety Disorder with panic attacks, r/o panic disorder  -Nicotine use disorder  -ADHD    Alexandru Mitchell is a 22-year-old with a history of the diagnoses listed above.  In addition, he reports traumatic loss of family member to suicide at age 11, which precipitated worsening of MH symptoms, and symptoms of a trauma/stressor related disorder should be further assessed.  He has a genetic loading for bipolar disorder (maternal grandfather and great uncle).      Today, Alexandru reports stable mood, and ongoing symptoms of ADHD which are impairing functioning.  We will increase Strattera dose today.  [place] labs reviewed and he accepts referral to endocrinology due to increase in nighttime urination/thirst.  He denies SI, intent or plan and risk of harm to self or others is low.  Advised beginning to look for psychiatric provider in [place] in preparation for his upcoming move.      Bipolar disorder  Continue lithium 1800 mg at bedtime  Continue lamictal 200 mg daily    ADHD  Increase Strattera to 60 mg daily    Anxiety  Metoprolol originally prescribed for tachycardia, will defer to PCP for management.    Recommended CBT for anxiety    Nicotine use disorder  Declines NRT    Long term management of high risk medications  3/2022: Li level (1.0), BMP, TSH reviewed with Alexandru today   Will refer to endocrinology for assessment of increased urination/thirst    RTC: 12 weeks or sooner if needed     CRISIS NUMBERS:   Provided routinely in AVS.      Treatment Risk Statement:  The patient understands the risks, benefits, adverse effects and alternatives. Agrees to treatment with the capacity to do so. No medical contraindications to treatment. Agrees to call clinic for any problems. The patient understands to call 911 or go to the nearest ED if life threatening or urgent symptoms occur.     Psychiatry Individual Psychotherapy Note   Psychotherapy start time - 3:20 PM  Psychotherapy end time - 3:40  PM  Date last reviewed -  4/14/22    Subjective: This supportive psychotherapy session addressed issues related to goals of therapy and current psychosocial stressors.   Interactive complexity indicated? No  Plan: RTC in timeframe noted above  Psychotherapy services during this visit included myself and the patient.   Treatment Plan      SYMPTOMS; PROBLEMS   MEASURABLE GOALS;    FUNCTIONAL IMPROVEMENT / GAINS INTERVENTIONS DISCHARGE CRITERIA   Bipolar disorder       Prevent episodes of jose raul/depression  Maintain sleep schedule     Supportive / psychodynamic Sustained remission of symptoms

## 2022-04-14 NOTE — PROGRESS NOTES
Alexandru Mitchell is a 22 year old who has consented to receive services via billable video visit.      Pt will join video visit via: [employer]  If there are problems joining the visit, send backup video invite via: Text to preferred phone: [phone]      Originating Location (patient location): Patient's home  Distant Location (provider location): [facility] & ADDICTION [place] [facility]    Will anyone else be joining the video visit? No    How would you prefer to obtain AVS?: [employer]  Answers for HPI/ROS submitted by the patient on 4/14/2022  RACHEL 7 TOTAL SCORE: 13

## 2022-04-14 NOTE — PATIENT INSTRUCTIONS
Endocrinology referral:  [facility] [place] will call you to coordinate your care as prescribed by the provider.  If you don t hear from a representative within 2 business days, please call [phone]    **For crisis resources, please see the information at the end of this document**   Patient Education    Thank you for coming to the [facility] & [facility] [place] [facility].    Lab Testing:  If you had lab testing today and your results are reassuring or normal they will be mailed to you or sent through [employer] within 7 days. If the lab tests need quick action we will call you with the results. The phone number we will call with results is # [phone]. If this is not the best number please call our clinic and change the number.     Medication Refills:  If you need any refills please call your pharmacy and they will contact us. Our fax number for refills is [phone]. Please allow three business days for refill processing.   If you need to change to a different pharmacy, please contact the new pharmacy directly. The new pharmacy will help you get your medications transferred.     Contact Us:  Please call [phone] during business hours (8-5:00 M-F).  If you have medication related questions after clinic hours, or on the weekend, please call [phone].    Financial Assistance [phone]  Medical Records [phone]       MENTAL HEALTH CRISIS RESOURCES:  For a emergency help, please call 911 or go to the nearest Emergency Department.     Emergency Walk-In Options:   [facility] Unit @ [facility] ([place]): [phone] - Specialized mental health emergency area designed to be calming  [facility] ([place]): [phone]  [facility] ([place]): [phone]  [facility]): [phone]    [facility] Crisis Information:   [place]: [phone]  [place]: [phone]  [place] ([place]) - Adult: [phone]     Child:  [phone]  [place] - Adult: [phone]     Child: [phone]  [place]: [phone]  List of all [facility] resources:   https://[place].gov/dhs/people-we-serve/adults/health-care/mental-health/resources/crisis-contacts.jsp    National Crisis Information:   Crisis Text Line: Text  [place]  to 288763  National Suicide Prevention Lifeline: 3-557-245-TALK (1-763.504.5317)       For online chat options, visit https://suicidepreventionlifeline.org/chat/  Poison Control Center: 8-323-861-1927  Trans Lifeline: 6-584-002-6947 - Hotline for transgender people of all ages  The [employer]er[employer] Project: 8-084-233-8989 - Hotline for LGBT youth     For Non-Emergency Support:   Fast Tracker: Mental Health & Substance Use Disorder Resources -   https://www.Avedrotrackermn.org/

## 2022-04-15 ASSESSMENT — ANXIETY QUESTIONNAIRES: GAD7 TOTAL SCORE: 13

## 2022-06-09 RX ORDER — ATOMOXETINE 40 MG/1
CAPSULE ORAL
Qty: 30 CAPSULE | Refills: 2 | OUTPATIENT
Start: 2022-06-09

## 2022-07-07 DIAGNOSIS — F39 MOOD DISORDER (H): ICD-10-CM

## 2022-07-07 RX ORDER — LAMOTRIGINE 100 MG/1
TABLET ORAL
Qty: 60 TABLET | Refills: 2 | Status: SHIPPED | OUTPATIENT
Start: 2022-07-07

## 2022-07-07 RX ORDER — ATOMOXETINE 60 MG/1
60 CAPSULE ORAL DAILY
Qty: 30 CAPSULE | Refills: 2 | Status: SHIPPED | OUTPATIENT
Start: 2022-07-07

## 2022-07-07 NOTE — CONFIDENTIAL NOTE
Refills of lamotrigine and atomoxetine provided per [employer] message.     Nataliya Dominique, CNP, 7/7/2022 3:46 PM

## 2022-09-18 ENCOUNTER — HEALTH MAINTENANCE LETTER (OUTPATIENT)
Age: 22
End: 2022-09-18

## 2023-05-07 ENCOUNTER — HEALTH MAINTENANCE LETTER (OUTPATIENT)
Age: 23
End: 2023-05-07

## 2024-07-14 ENCOUNTER — HEALTH MAINTENANCE LETTER (OUTPATIENT)
Age: 24
End: 2024-07-14

## 2024-11-07 NOTE — LETTER
2021      RE: Alexandru Mitchell  [address] [id number]  : 2000         To Whom it May Concern,    Alexandru Mitchell is under my care at the [facility]. He has a future appointment scheduled with me on 21 at 10:00 am. I'm asking that you please accommodate as able.      Sincerely,    *ELECTRONICALLY SIGNED BY ZO FAUSTIN CNP*    ZO Faustin CNP   Home

## 2025-02-18 NOTE — NURSING NOTE
Chief Complaint   Patient presents with     Recheck Medication     pt. wants to go over medication, wants to go over psych report from visit on 2019       Geneva Fuentes, EMT  Alexandru Mitchell      1.  Has the patient received the information for the influenza vaccine? YES    2.  Does the patient have any of the following contraindications?     Allergy to eggs? No     Allergy to a component of the influenza vaccine? No     Serious reaction to previous influenza vaccines? No     Paralyzed by Guillain-Mackville syndrome? No     Currently sick today? No         3.  Verified patient name and  prior to injection. Yes  4.  The patient was instructed to wait 15 minutes before leaving the building in the event of an allergic reaction: YES        Vaccination given by                       .      Recorded by Alfredo Boyce CMA Tyler M Phy comes into clinic today at the request of Analilia, Ordering Provider for an immunization/s.    I gave the Flu immunization/s while the patient was in clinic. They received an informational sheet and I explained the common side effects of the injection. The patient tolerated the procedure well and had no complications while here in clinic.     This service provided today was under the supervising provider of the nelly Billingsley, who was available if needed.    Alfredo Boyce CMA, EMT at 3:31 PM on 2019.     Male